# Patient Record
Sex: MALE | Race: WHITE | Employment: OTHER | URBAN - METROPOLITAN AREA
[De-identification: names, ages, dates, MRNs, and addresses within clinical notes are randomized per-mention and may not be internally consistent; named-entity substitution may affect disease eponyms.]

---

## 2017-03-03 ENCOUNTER — OFFICE VISIT (OUTPATIENT)
Dept: INTERNAL MEDICINE CLINIC | Facility: CLINIC | Age: 72
End: 2017-03-03

## 2017-03-03 VITALS
HEART RATE: 88 BPM | BODY MASS INDEX: 26.63 KG/M2 | SYSTOLIC BLOOD PRESSURE: 138 MMHG | HEIGHT: 70 IN | DIASTOLIC BLOOD PRESSURE: 88 MMHG | WEIGHT: 186 LBS | RESPIRATION RATE: 16 BRPM

## 2017-03-03 DIAGNOSIS — E55.9 VITAMIN D DEFICIENCY: ICD-10-CM

## 2017-03-03 DIAGNOSIS — E78.2 MIXED HYPERLIPIDEMIA: ICD-10-CM

## 2017-03-03 DIAGNOSIS — K29.70 GASTRITIS WITHOUT BLEEDING, UNSPECIFIED CHRONICITY, UNSPECIFIED GASTRITIS TYPE: ICD-10-CM

## 2017-03-03 DIAGNOSIS — Z12.5 SCREENING FOR PROSTATE CANCER: ICD-10-CM

## 2017-03-03 DIAGNOSIS — I10 ESSENTIAL HYPERTENSION: Primary | ICD-10-CM

## 2017-03-03 PROCEDURE — G0463 HOSPITAL OUTPT CLINIC VISIT: HCPCS | Performed by: INTERNAL MEDICINE

## 2017-03-03 PROCEDURE — 99214 OFFICE O/P EST MOD 30 MIN: CPT | Performed by: INTERNAL MEDICINE

## 2017-03-03 RX ORDER — NAPROXEN 500 MG/1
500 TABLET ORAL AS NEEDED
Qty: 30 TABLET | Refills: 3 | Status: SHIPPED | OUTPATIENT
Start: 2017-03-03 | End: 2019-01-07

## 2017-03-03 RX ORDER — OMEPRAZOLE 20 MG/1
20 CAPSULE, DELAYED RELEASE ORAL EVERY MORNING
Qty: 90 CAPSULE | Refills: 3 | Status: SHIPPED | OUTPATIENT
Start: 2017-03-03 | End: 2018-01-23

## 2017-03-03 RX ORDER — LISINOPRIL AND HYDROCHLOROTHIAZIDE 12.5; 1 MG/1; MG/1
1 TABLET ORAL DAILY
Qty: 90 TABLET | Refills: 3 | Status: SHIPPED | OUTPATIENT
Start: 2017-03-03 | End: 2018-01-23

## 2017-03-03 NOTE — PROGRESS NOTES
Litzy Cabrera is a 73year old male. HPI:     1. Essential hypertension    Patient has been following low salt diet and has been taking anti-hypertensive prescriptions as prescribed.  Blood pressure has been checked and is under good control at home wit nourished,in no apparent distress  SKIN: no rashes,no suspicious lesions  HEENT: atraumatic, normocephalic,ears and throat are clear  NECK: supple,no adenopathy,no bruits  LUNGS: clear to auscultation  CARDIO: RRR without murmur  GI: good BS's,no masses, H

## 2017-03-07 ENCOUNTER — LAB ENCOUNTER (OUTPATIENT)
Dept: LAB | Age: 72
End: 2017-03-07
Attending: INTERNAL MEDICINE
Payer: MEDICARE

## 2017-03-07 DIAGNOSIS — E55.9 VITAMIN D DEFICIENCY: ICD-10-CM

## 2017-03-07 DIAGNOSIS — I10 ESSENTIAL HYPERTENSION: ICD-10-CM

## 2017-03-07 DIAGNOSIS — E78.2 MIXED HYPERLIPIDEMIA: ICD-10-CM

## 2017-03-07 DIAGNOSIS — Z12.5 SCREENING FOR PROSTATE CANCER: ICD-10-CM

## 2017-03-07 LAB
ALBUMIN SERPL BCP-MCNC: 3.6 G/DL (ref 3.5–4.8)
ALBUMIN/GLOB SERPL: 1.3 {RATIO} (ref 1–2)
ALP SERPL-CCNC: 83 U/L (ref 32–100)
ALT SERPL-CCNC: 21 U/L (ref 17–63)
ANION GAP SERPL CALC-SCNC: 7 MMOL/L (ref 0–18)
AST SERPL-CCNC: 21 U/L (ref 15–41)
BASOPHILS # BLD: 0.1 K/UL (ref 0–0.2)
BASOPHILS NFR BLD: 1 %
BILIRUB SERPL-MCNC: 1.1 MG/DL (ref 0.3–1.2)
BUN SERPL-MCNC: 13 MG/DL (ref 8–20)
BUN/CREAT SERPL: 12.6 (ref 10–20)
CALCIUM SERPL-MCNC: 8.9 MG/DL (ref 8.5–10.5)
CHLORIDE SERPL-SCNC: 104 MMOL/L (ref 95–110)
CHOLEST SERPL-MCNC: 165 MG/DL (ref 110–200)
CO2 SERPL-SCNC: 28 MMOL/L (ref 22–32)
CREAT SERPL-MCNC: 1.03 MG/DL (ref 0.5–1.5)
EOSINOPHIL # BLD: 3.1 K/UL (ref 0–0.7)
EOSINOPHIL NFR BLD: 28 %
ERYTHROCYTE [DISTWIDTH] IN BLOOD BY AUTOMATED COUNT: 14.4 % (ref 11–15)
GLOBULIN PLAS-MCNC: 2.8 G/DL (ref 2.5–3.7)
GLUCOSE SERPL-MCNC: 99 MG/DL (ref 70–99)
HCT VFR BLD AUTO: 42.4 % (ref 41–52)
HDLC SERPL-MCNC: 42 MG/DL
HGB BLD-MCNC: 13.9 G/DL (ref 13.5–17.5)
LDLC SERPL CALC-MCNC: 107 MG/DL (ref 0–99)
LYMPHOCYTES # BLD: 2.1 K/UL (ref 1–4)
LYMPHOCYTES NFR BLD: 19 %
MCH RBC QN AUTO: 28.3 PG (ref 27–32)
MCHC RBC AUTO-ENTMCNC: 32.8 G/DL (ref 32–37)
MCV RBC AUTO: 86.3 FL (ref 80–100)
MONOCYTES # BLD: 0.6 K/UL (ref 0–1)
MONOCYTES NFR BLD: 6 %
NEUTROPHILS # BLD AUTO: 5.2 K/UL (ref 1.8–7.7)
NEUTROPHILS NFR BLD: 47 %
NONHDLC SERPL-MCNC: 123 MG/DL
OSMOLALITY UR CALC.SUM OF ELEC: 288 MOSM/KG (ref 275–295)
PLATELET # BLD AUTO: 333 K/UL (ref 140–400)
PMV BLD AUTO: 8.5 FL (ref 7.4–10.3)
POTASSIUM SERPL-SCNC: 3.7 MMOL/L (ref 3.3–5.1)
PROT SERPL-MCNC: 6.4 G/DL (ref 5.9–8.4)
PSA SERPL-MCNC: 0.5 NG/ML (ref 0–4)
RBC # BLD AUTO: 4.92 M/UL (ref 4.5–5.9)
SODIUM SERPL-SCNC: 139 MMOL/L (ref 136–144)
TRIGL SERPL-MCNC: 78 MG/DL (ref 1–149)
TSH SERPL-ACNC: 2.7 UIU/ML (ref 0.34–5.6)
WBC # BLD AUTO: 11.1 K/UL (ref 4–11)

## 2017-03-07 PROCEDURE — 80061 LIPID PANEL: CPT

## 2017-03-07 PROCEDURE — 85060 BLOOD SMEAR INTERPRETATION: CPT

## 2017-03-07 PROCEDURE — 84443 ASSAY THYROID STIM HORMONE: CPT

## 2017-03-07 PROCEDURE — 85007 BL SMEAR W/DIFF WBC COUNT: CPT

## 2017-03-07 PROCEDURE — 85025 COMPLETE CBC W/AUTO DIFF WBC: CPT

## 2017-03-07 PROCEDURE — 82306 VITAMIN D 25 HYDROXY: CPT

## 2017-03-07 PROCEDURE — 36415 COLL VENOUS BLD VENIPUNCTURE: CPT

## 2017-03-07 PROCEDURE — 80053 COMPREHEN METABOLIC PANEL: CPT

## 2017-03-07 PROCEDURE — 85027 COMPLETE CBC AUTOMATED: CPT

## 2017-03-08 LAB — 25(OH)D3 SERPL-MCNC: 23.9 NG/ML

## 2017-03-20 ENCOUNTER — TELEPHONE (OUTPATIENT)
Dept: INTERNAL MEDICINE CLINIC | Facility: CLINIC | Age: 72
End: 2017-03-20

## 2018-01-23 ENCOUNTER — OFFICE VISIT (OUTPATIENT)
Dept: INTERNAL MEDICINE CLINIC | Facility: CLINIC | Age: 73
End: 2018-01-23

## 2018-01-23 VITALS
HEART RATE: 76 BPM | WEIGHT: 187 LBS | BODY MASS INDEX: 26.77 KG/M2 | SYSTOLIC BLOOD PRESSURE: 133 MMHG | DIASTOLIC BLOOD PRESSURE: 80 MMHG | RESPIRATION RATE: 16 BRPM | HEIGHT: 70 IN

## 2018-01-23 DIAGNOSIS — I10 ESSENTIAL HYPERTENSION WITH GOAL BLOOD PRESSURE LESS THAN 140/90: Primary | ICD-10-CM

## 2018-01-23 DIAGNOSIS — Z12.5 SCREENING FOR PROSTATE CANCER: ICD-10-CM

## 2018-01-23 DIAGNOSIS — E55.9 VITAMIN D DEFICIENCY: ICD-10-CM

## 2018-01-23 DIAGNOSIS — E78.2 MIXED HYPERLIPIDEMIA: ICD-10-CM

## 2018-01-23 DIAGNOSIS — L98.9 NON-HEALING SKIN LESION OF NOSE: ICD-10-CM

## 2018-01-23 DIAGNOSIS — K29.70 GASTRITIS WITHOUT BLEEDING, UNSPECIFIED CHRONICITY, UNSPECIFIED GASTRITIS TYPE: ICD-10-CM

## 2018-01-23 PROCEDURE — G0463 HOSPITAL OUTPT CLINIC VISIT: HCPCS | Performed by: INTERNAL MEDICINE

## 2018-01-23 PROCEDURE — 99214 OFFICE O/P EST MOD 30 MIN: CPT | Performed by: INTERNAL MEDICINE

## 2018-01-23 RX ORDER — OMEPRAZOLE 20 MG/1
20 CAPSULE, DELAYED RELEASE ORAL EVERY MORNING
Qty: 90 CAPSULE | Refills: 3 | Status: SHIPPED | OUTPATIENT
Start: 2018-01-23 | End: 2018-07-03 | Stop reason: ALTCHOICE

## 2018-01-23 RX ORDER — LISINOPRIL AND HYDROCHLOROTHIAZIDE 12.5; 1 MG/1; MG/1
1 TABLET ORAL DAILY
Qty: 90 TABLET | Refills: 3 | Status: SHIPPED | OUTPATIENT
Start: 2018-01-23 | End: 2019-01-07

## 2018-01-23 NOTE — PROGRESS NOTES
Dannie Person. is a 81year old male. HPI:     1. Essential hypertension with goal blood pressure less than 140/90    Patient has been following low salt diet and has been taking anti-hypertensive prescriptions as prescribed.  Blood pressure has been francie • Unspecified essential hypertension       Social History:  Smoking status: Never Smoker                                                              Alcohol use:  Yes              Comment: Occasionally; beer and wine        REVIEW OF SYSTEMS:   GENERAL H Future  - ASSAY, THYROID STIM HORMONE; Future    3. Gastritis without bleeding, unspecified chronicity, unspecified gastritis type    Had hiatal hernia and has been on omeprazole intermittently for years. Had UGI endoscopy that showed hiatal hernia.  Needs

## 2018-03-21 ENCOUNTER — NURSE TRIAGE (OUTPATIENT)
Dept: OTHER | Age: 73
End: 2018-03-21

## 2018-03-21 NOTE — TELEPHONE ENCOUNTER
Pt contacted and stts he will check his schedule and call back for appt. Stts he may not need it.   Stts he is feeling better

## 2018-03-21 NOTE — TELEPHONE ENCOUNTER
Action Requested: Summary for Provider     []  Critical Lab, Recommendations Needed  [] Need Additional Advice  []   FYI    []   Need Orders  [x] Need Medications Sent to Pharmacy  []  Other     SUMMARY: pt requesting abx, Offered appt, pt declined     Ons

## 2018-04-06 ENCOUNTER — HOSPITAL ENCOUNTER (OUTPATIENT)
Age: 73
Discharge: HOME | End: 2018-04-06
Attending: EMERGENCY MEDICINE
Payer: MEDICARE

## 2018-04-06 VITALS
RESPIRATION RATE: 18 BRPM | OXYGEN SATURATION: 96 % | WEIGHT: 182 LBS | TEMPERATURE: 98 F | DIASTOLIC BLOOD PRESSURE: 79 MMHG | SYSTOLIC BLOOD PRESSURE: 140 MMHG | HEIGHT: 70 IN | HEART RATE: 80 BPM | BODY MASS INDEX: 26.05 KG/M2

## 2018-04-06 DIAGNOSIS — S61.412A LACERATION OF LEFT HAND, FOREIGN BODY PRESENCE UNSPECIFIED, INITIAL ENCOUNTER: Primary | ICD-10-CM

## 2018-04-06 PROCEDURE — 99213 OFFICE O/P EST LOW 20 MIN: CPT

## 2018-04-06 PROCEDURE — 99204 OFFICE O/P NEW MOD 45 MIN: CPT

## 2018-04-06 PROCEDURE — 29130 APPL FINGER SPLINT STATIC: CPT

## 2018-04-06 RX ORDER — CEPHALEXIN 500 MG/1
500 CAPSULE ORAL 3 TIMES DAILY
Qty: 21 CAPSULE | Refills: 0 | Status: SHIPPED | OUTPATIENT
Start: 2018-04-06 | End: 2018-04-13

## 2018-04-06 NOTE — ED PROVIDER NOTES
Patient Seen in: [facility] In [address] [place] [address]    History   Patient presents with:  Laceration Abrasion (integumentary)    Stated Complaint: lac    HPI  Prior to arrival patient was at home using an angle  cutting fiberglass in his Constitutional: He is oriented to person, place, and time. He appears well-developed and well-nourished. No distress. Well appearing   HENT:   Head: Normocephalic and atraumatic.    Right Ear: External ear normal.   Left Ear: External ear normal.   Eyes: MDM   Follow-up, return and home management were discussed with the patient. Wound check in 2  or 3 days was advised. Patient is concerned about infection.   As he does not want an x-ray I will start him on antibiotics now is a feel I do not have enough i

## 2018-04-06 NOTE — ED NOTES
Please void order for dermabond. Dermabond not used.  MD used benzoin and steri strips to close wound

## 2018-04-06 NOTE — ED INITIAL ASSESSMENT (HPI)
Pt with laceration to left hand. States he cut himself with and Angle  today. States last tetanus shot was 2017.

## 2018-04-10 NOTE — TELEPHONE ENCOUNTER
Hypertensive Medications: Protocol failed, please advise on prescription request.  Labs out of range, please advise.     Protocol Criteria:  · Appointment scheduled in the past 6 months or in the next 3 months  · BMP or CMP in the past 12 months  · Creatini

## 2018-04-11 RX ORDER — LISINOPRIL AND HYDROCHLOROTHIAZIDE 12.5; 1 MG/1; MG/1
1 TABLET ORAL DAILY
Qty: 90 TABLET | Refills: 2 | Status: SHIPPED | OUTPATIENT
Start: 2018-04-11 | End: 2019-02-12

## 2018-04-12 ENCOUNTER — LAB ENCOUNTER (OUTPATIENT)
Dept: LAB | Age: 73
End: 2018-04-12
Attending: INTERNAL MEDICINE
Payer: MEDICARE

## 2018-04-12 DIAGNOSIS — I10 ESSENTIAL HYPERTENSION WITH GOAL BLOOD PRESSURE LESS THAN 140/90: ICD-10-CM

## 2018-04-12 DIAGNOSIS — E55.9 VITAMIN D DEFICIENCY: ICD-10-CM

## 2018-04-12 DIAGNOSIS — E78.2 MIXED HYPERLIPIDEMIA: ICD-10-CM

## 2018-04-12 DIAGNOSIS — Z12.5 SCREENING FOR PROSTATE CANCER: ICD-10-CM

## 2018-04-12 PROCEDURE — 36415 COLL VENOUS BLD VENIPUNCTURE: CPT

## 2018-04-12 PROCEDURE — 80053 COMPREHEN METABOLIC PANEL: CPT

## 2018-04-12 PROCEDURE — 85060 BLOOD SMEAR INTERPRETATION: CPT

## 2018-04-12 PROCEDURE — 85007 BL SMEAR W/DIFF WBC COUNT: CPT

## 2018-04-12 PROCEDURE — 80061 LIPID PANEL: CPT

## 2018-04-12 PROCEDURE — 82306 VITAMIN D 25 HYDROXY: CPT

## 2018-04-12 PROCEDURE — 85025 COMPLETE CBC W/AUTO DIFF WBC: CPT

## 2018-04-12 PROCEDURE — 85027 COMPLETE CBC AUTOMATED: CPT

## 2018-04-12 PROCEDURE — 84443 ASSAY THYROID STIM HORMONE: CPT

## 2018-04-24 ENCOUNTER — TELEPHONE (OUTPATIENT)
Dept: OTHER | Age: 73
End: 2018-04-24

## 2018-04-24 NOTE — TELEPHONE ENCOUNTER
Attempted to call pt , no answer no VM   Retry later      Notes recorded by Charles Hill MD on 4/24/2018 at 12:43 PM CDT  Blood test is good with normal blood  liver, thyroid, urine, lipids, PSA and kidney tests. Mild anemia seen.  When was last col

## 2018-04-26 NOTE — TELEPHONE ENCOUNTER
Patient returning a call (verified name and ), advised Dr Vera Silveira's notes and verablized understanding.  States that he has OV schedule with GI specialist on 18 and will do the Colonoscopy, requesting copy of all the blood tests and mail to the ad

## 2018-05-16 ENCOUNTER — OFFICE VISIT (OUTPATIENT)
Dept: GASTROENTEROLOGY | Facility: CLINIC | Age: 73
End: 2018-05-16

## 2018-05-16 ENCOUNTER — TELEPHONE (OUTPATIENT)
Dept: GASTROENTEROLOGY | Facility: CLINIC | Age: 73
End: 2018-05-16

## 2018-05-16 VITALS
HEIGHT: 68 IN | HEART RATE: 75 BPM | WEIGHT: 175 LBS | SYSTOLIC BLOOD PRESSURE: 130 MMHG | DIASTOLIC BLOOD PRESSURE: 69 MMHG | BODY MASS INDEX: 26.52 KG/M2

## 2018-05-16 DIAGNOSIS — Z86.010 HISTORY OF COLON POLYPS: ICD-10-CM

## 2018-05-16 DIAGNOSIS — Z12.11 COLON CANCER SCREENING: Primary | ICD-10-CM

## 2018-05-16 DIAGNOSIS — Z86.010 HISTORY OF COLONIC POLYPS: ICD-10-CM

## 2018-05-16 DIAGNOSIS — K21.9 GASTROESOPHAGEAL REFLUX DISEASE WITHOUT ESOPHAGITIS: Primary | ICD-10-CM

## 2018-05-16 DIAGNOSIS — Z12.11 SCREENING FOR COLORECTAL CANCER: ICD-10-CM

## 2018-05-16 DIAGNOSIS — Z12.12 SCREENING FOR COLORECTAL CANCER: ICD-10-CM

## 2018-05-16 PROCEDURE — 99203 OFFICE O/P NEW LOW 30 MIN: CPT | Performed by: INTERNAL MEDICINE

## 2018-05-16 PROCEDURE — G0463 HOSPITAL OUTPT CLINIC VISIT: HCPCS | Performed by: INTERNAL MEDICINE

## 2018-05-16 NOTE — PATIENT INSTRUCTIONS
1.  Please take 20 mg of Pepcid (famotidine) prior to eating any food that causes your symptoms. 2.  Schedule colonoscopy for a history of polyps and screening following a Suprep (may substitute TriLyte if needed) and IV sedation.

## 2018-05-16 NOTE — PROGRESS NOTES
HPI:    Patient ID: Haseeb Bryant. is a 81year old male. HPI  Mr. Jo returns in follow-up. He has not been seen since 2009. As per previous notes the patient was hospitalized for gastrointestinal bleeding in June 2007.   A colonoscopy revealed kg)  04/06/18 : 182 lb (82.6 kg)  01/23/18 : 187 lb (84.8 kg)  03/03/17 : 186 lb (84.4 kg)  02/15/16 : 183 lb 6.4 oz (83.2 kg)  04/09/15 : 183 lb (83 kg)  03/13/15 : 184 lb 3.2 oz (83.6 kg)           Current Outpatient Prescriptions:  Na Sulfate-K Sulfate- reviewed.     Component      Latest Ref Rng & Units 4/12/2018 3/7/2017 3/19/2015   WBC      4.0 - 11.0 K/UL 12.0 (H) 11.1 (H) 12.7 (H)   RBC      4.50 - 5.90 M/UL 4.79 4.92 4.90   Hemoglobin      13.5 - 17.5 g/dL 13.2 (L) 13.9 14.0   Hematocrit      41.0 - NEUTROPHILS #      1.8 - 7.7 K/UL   6.7   LYMPHOCYTES #      1.0 - 4.0 K/UL   2.3   MONOCYTES #      0.0 - 1.0 K/UL   1.0   EOSINOPHILS #      0.0 - 0.7 K/UL   2.5 (H)   BASOPHILS #      0.0 - 0.2 K/UL   0.1   Vitamin D, 25OH, Total      ng/mL 27.5

## 2018-05-16 NOTE — TELEPHONE ENCOUNTER
Scheduled for: colonoscopy 45777  Provider Name: Dr Sherri Cuevas  Date:  Kiley Scott 7/03/18  Location:  [facility]  Sedation:  IV  Time:  10:15 am  Prep: new split dose suprep   Meds/Allergies Reconciled?:  NKDA  Diagnosis with codes:  Colon cancer screening Z12.11, HCP Z86.010

## 2018-07-03 ENCOUNTER — HOSPITAL ENCOUNTER (OUTPATIENT)
Facility: HOSPITAL | Age: 73
Setting detail: HOSPITAL OUTPATIENT SURGERY
Discharge: HOME | End: 2018-07-03
Attending: INTERNAL MEDICINE | Admitting: INTERNAL MEDICINE
Payer: MEDICARE

## 2018-07-03 ENCOUNTER — SURGERY (OUTPATIENT)
Age: 73
End: 2018-07-03

## 2018-07-03 VITALS
HEART RATE: 56 BPM | OXYGEN SATURATION: 95 % | SYSTOLIC BLOOD PRESSURE: 119 MMHG | RESPIRATION RATE: 14 BRPM | BODY MASS INDEX: 24.77 KG/M2 | WEIGHT: 173 LBS | HEIGHT: 70 IN | DIASTOLIC BLOOD PRESSURE: 73 MMHG

## 2018-07-03 DIAGNOSIS — Z12.11 COLON CANCER SCREENING: ICD-10-CM

## 2018-07-03 DIAGNOSIS — Z86.010 HISTORY OF COLONIC POLYPS: ICD-10-CM

## 2018-07-03 PROCEDURE — 0DBH8ZX EXCISION OF CECUM, VIA NATURAL OR ARTIFICIAL OPENING ENDOSCOPIC, DIAGNOSTIC: ICD-10-PCS | Performed by: INTERNAL MEDICINE

## 2018-07-03 PROCEDURE — 43239 EGD BIOPSY SINGLE/MULTIPLE: CPT | Performed by: INTERNAL MEDICINE

## 2018-07-03 PROCEDURE — 0D748ZZ DILATION OF ESOPHAGOGASTRIC JUNCTION, VIA NATURAL OR ARTIFICIAL OPENING ENDOSCOPIC: ICD-10-PCS | Performed by: INTERNAL MEDICINE

## 2018-07-03 PROCEDURE — 45380 COLONOSCOPY AND BIOPSY: CPT | Performed by: INTERNAL MEDICINE

## 2018-07-03 PROCEDURE — 0DB48ZX EXCISION OF ESOPHAGOGASTRIC JUNCTION, VIA NATURAL OR ARTIFICIAL OPENING ENDOSCOPIC, DIAGNOSTIC: ICD-10-PCS | Performed by: INTERNAL MEDICINE

## 2018-07-03 PROCEDURE — 45385 COLONOSCOPY W/LESION REMOVAL: CPT | Performed by: INTERNAL MEDICINE

## 2018-07-03 PROCEDURE — 43249 ESOPH EGD DILATION <30 MM: CPT | Performed by: INTERNAL MEDICINE

## 2018-07-03 RX ORDER — MIDAZOLAM HYDROCHLORIDE 1 MG/ML
INJECTION INTRAMUSCULAR; INTRAVENOUS
Status: DISCONTINUED | OUTPATIENT
Start: 2018-07-03 | End: 2018-07-03 | Stop reason: HOSPADM

## 2018-07-03 RX ORDER — PANTOPRAZOLE SODIUM 40 MG/1
40 TABLET, DELAYED RELEASE ORAL
Qty: 90 TABLET | Refills: 1 | Status: ON HOLD | OUTPATIENT
Start: 2018-07-03 | End: 2018-10-10

## 2018-07-03 RX ORDER — SODIUM CHLORIDE 0.9 % (FLUSH) 0.9 %
10 SYRINGE (ML) INJECTION AS NEEDED
Status: DISCONTINUED | OUTPATIENT
Start: 2018-07-03 | End: 2018-07-03 | Stop reason: HOSPADM

## 2018-07-03 RX ORDER — SODIUM CHLORIDE, SODIUM LACTATE, POTASSIUM CHLORIDE, CALCIUM CHLORIDE 600; 310; 30; 20 MG/100ML; MG/100ML; MG/100ML; MG/100ML
INJECTION, SOLUTION INTRAVENOUS CONTINUOUS
Status: DISCONTINUED | OUTPATIENT
Start: 2018-07-03 | End: 2018-07-03

## 2018-07-03 RX ORDER — MIDAZOLAM HYDROCHLORIDE 1 MG/ML
1 INJECTION INTRAMUSCULAR; INTRAVENOUS EVERY 5 MIN PRN
Status: DISCONTINUED | OUTPATIENT
Start: 2018-07-03 | End: 2018-07-03 | Stop reason: HOSPADM

## 2018-07-03 NOTE — H&P
History & Physical Examination    Patient Name: Danyell Pop MRN: [id number]  CSN: [id number]  YOB: 1945    Diagnosis: Personal history of adenomatous colon polyps, colorectal cancer screening, gastroesophageal reflux/esophageal spasm/? d Exam is Normal If not normal, please explain:   JOHANNA Adger.Radha ] [ [facility]  Adger.Radha ] [ Bradley Hale Adger.Radha ] [ Minnie Willson Adger.Radha ] [ Nelly Cueva Adger.Radha ] [ Tasneem Record Adger.Radha ] [ Duane Gardener        [ x ] I have discussed the risks and benefits and alternatives with the p

## 2018-07-03 NOTE — OPERATIVE REPORT
[facility] Endoscopy Report      Date of Procedure:  07/03/18      Preoperative Diagnosis:  1. Personal history of adenomatous colon polyps  2. Colorectal cancer screening  3.   Gastroesophageal reflux/possible esophageal spasm/possible dy midazolam in divided doses, an Olympus adult HD gastroscope was inserted into the hypopharynx and advanced under direct vision into the esophagus, stomach and duodenum.   The endoscope was withdrawn to the stomach where retroflexion of the angulus, body, ca including cardia, fundus, gastric body and antrum was normal with the exception of a patulous cardia.    The duodenal bulb and post bulbar regions were normal.  The ampulla was visualized and normal.    Following diagnostic endoscopy a TTS balloon dilator w

## 2018-07-10 ENCOUNTER — TELEPHONE (OUTPATIENT)
Dept: GASTROENTEROLOGY | Facility: CLINIC | Age: 73
End: 2018-07-10

## 2018-07-10 NOTE — TELEPHONE ENCOUNTER
----- Message from Amaury Wilkinson MD sent at 7/9/2018  7:32 PM CDT -----  I spoke to the patient. He is feeling significantly better. He had #2 subcentimeter tubular adenomas removed. I have discussed the significance.   Biopsies of the cecal nodul

## 2018-08-03 ENCOUNTER — TELEPHONE (OUTPATIENT)
Dept: GASTROENTEROLOGY | Facility: CLINIC | Age: 73
End: 2018-08-03

## 2018-08-03 NOTE — TELEPHONE ENCOUNTER
----- Message from Darrel Eagle, Ariana Castle sent at 7/10/2018  9:04 AM CDT -----  Regarding: Recall EGD   Recall EGD in 3 months per GS. EGD done 7-3-18.

## 2018-08-13 ENCOUNTER — TELEPHONE (OUTPATIENT)
Dept: GASTROENTEROLOGY | Facility: CLINIC | Age: 73
End: 2018-08-13

## 2018-08-13 DIAGNOSIS — K20.90 ESOPHAGITIS: Primary | ICD-10-CM

## 2018-08-13 NOTE — TELEPHONE ENCOUNTER
Last Procedure:  7/3/18 EGD  Last Diagnosis:  5. LA grade D esophagitis  6. Slight esophageal stricture  7.  5 cm hiatal hernia  8. Status post esophageal dilatation to 16.5 mm TTS.   Recalled for: 12 weeks EGD ONLY  Sedation used previously:  IV  Last P

## 2018-08-13 NOTE — TELEPHONE ENCOUNTER
The patient's chart has been reviewed. Okay to schedule pt for 12 week EGD recall r/t esophagitis/ulceration with Dr. Dionne Pate. Advise IV Revere sedation with NPO after midnight.      May continue all medications listed in the med module

## 2018-08-13 NOTE — TELEPHONE ENCOUNTER
Pt states he got a letter for a REPEAT EGD . Please call .  States medication given to pt from  everything has been working good and wanted to inform

## 2018-08-14 NOTE — TELEPHONE ENCOUNTER
Scheduled for:  EGD - 30034  Provider Name:  Dr. Galina Alejandra  Date:  10/10/18  Location:  [facility]  Sedation:  IV  Time:  9:00 am (pt is aware to arrive at 8:00 am)  Prep:  NPO after midnight, Prep instructions were given to pt over the phone, pt verbalized understandin

## 2018-09-25 ENCOUNTER — OFFICE VISIT (OUTPATIENT)
Dept: DERMATOLOGY CLINIC | Facility: CLINIC | Age: 73
End: 2018-09-25
Payer: MEDICARE

## 2018-09-25 DIAGNOSIS — D48.5 NEOPLASM OF UNCERTAIN BEHAVIOR OF SKIN: Primary | ICD-10-CM

## 2018-09-25 DIAGNOSIS — L57.8 SUN-DAMAGED SKIN: ICD-10-CM

## 2018-09-25 DIAGNOSIS — L81.4 SOLAR LENTIGO: ICD-10-CM

## 2018-09-25 DIAGNOSIS — D18.01 HEMANGIOMA OF SKIN AND SUBCUTANEOUS TISSUE: ICD-10-CM

## 2018-09-25 DIAGNOSIS — L82.1 SEBORRHEIC KERATOSES: ICD-10-CM

## 2018-09-25 DIAGNOSIS — D23.30 BENIGN NEOPLASM OF SKIN OF FACE: ICD-10-CM

## 2018-09-25 DIAGNOSIS — L57.0 ACTINIC KERATOSIS: ICD-10-CM

## 2018-09-25 PROCEDURE — 17000 DESTRUCT PREMALG LESION: CPT | Performed by: DERMATOLOGY

## 2018-09-25 PROCEDURE — 88305 TISSUE EXAM BY PATHOLOGIST: CPT | Performed by: DERMATOLOGY

## 2018-09-25 PROCEDURE — 99202 OFFICE O/P NEW SF 15 MIN: CPT | Performed by: DERMATOLOGY

## 2018-09-25 PROCEDURE — 11100 BIOPSY OF SKIN LESION: CPT | Performed by: DERMATOLOGY

## 2018-09-25 NOTE — PROGRESS NOTES
HPI:     Chief Complaint     Lesion        HPI     Lesion      Additional comments:  \"New pt\"- Pt presents today with lesion to chest that was raised at onetime and now is flat and tends to get irriated at times and also would like discoloration to nose e (50,000 Units total) by mouth once a week.  Disp: 12 capsule Rfl: 0     Allergies:   No Known Allergies    Past Medical History:  No date: Esophageal reflux  02/17/2014: Lipid screening  No date: Psoriasis  No date: Unspecified essential hypertension  Past Asked        Weight Concern: Not Asked        Special Diet: Not Asked        Back Care: Not Asked        Exercise: Not Asked        Bike Helmet: Not Asked        Seat Belt: Not Asked        Self-Exams: Not Asked        Grew up on a farm: Not Asked        H OF SKIN LESION      DESTRUCTION PREMALIGNANT LESIONS, FIRST LES      Specimen to Pathology, Tissue [IHP Pt to Josiane Lyles      Results From Past 48 Hours:  No results found for this or any previous visit (from the past 50 hour(s)).     Meds This Visit:

## 2018-09-27 NOTE — PROGRESS NOTES
Patient informed with a verbal understanding. Logged and in [address].  Appt booked

## 2018-10-10 ENCOUNTER — HOSPITAL ENCOUNTER (OUTPATIENT)
Facility: HOSPITAL | Age: 73
Setting detail: HOSPITAL OUTPATIENT SURGERY
Discharge: HOME | End: 2018-10-10
Attending: INTERNAL MEDICINE | Admitting: INTERNAL MEDICINE
Payer: MEDICARE

## 2018-10-10 DIAGNOSIS — K20.90 ESOPHAGITIS: ICD-10-CM

## 2018-10-10 PROCEDURE — 43239 EGD BIOPSY SINGLE/MULTIPLE: CPT | Performed by: INTERNAL MEDICINE

## 2018-10-10 PROCEDURE — 0DB48ZX EXCISION OF ESOPHAGOGASTRIC JUNCTION, VIA NATURAL OR ARTIFICIAL OPENING ENDOSCOPIC, DIAGNOSTIC: ICD-10-PCS | Performed by: INTERNAL MEDICINE

## 2018-10-10 PROCEDURE — G0500 MOD SEDAT ENDO SERVICE >5YRS: HCPCS | Performed by: INTERNAL MEDICINE

## 2018-10-10 RX ORDER — PANTOPRAZOLE SODIUM 40 MG/1
40 TABLET, DELAYED RELEASE ORAL
Qty: 90 TABLET | Refills: 3 | Status: SHIPPED | OUTPATIENT
Start: 2018-10-10 | End: 2019-02-05

## 2018-10-10 RX ORDER — SODIUM CHLORIDE 0.9 % (FLUSH) 0.9 %
10 SYRINGE (ML) INJECTION AS NEEDED
Status: DISCONTINUED | OUTPATIENT
Start: 2018-10-10 | End: 2018-10-10

## 2018-10-10 RX ORDER — SODIUM CHLORIDE, SODIUM LACTATE, POTASSIUM CHLORIDE, CALCIUM CHLORIDE 600; 310; 30; 20 MG/100ML; MG/100ML; MG/100ML; MG/100ML
INJECTION, SOLUTION INTRAVENOUS CONTINUOUS
Status: DISCONTINUED | OUTPATIENT
Start: 2018-10-10 | End: 2018-10-10

## 2018-10-10 RX ORDER — MIDAZOLAM HYDROCHLORIDE 1 MG/ML
1 INJECTION INTRAMUSCULAR; INTRAVENOUS EVERY 5 MIN PRN
Status: DISCONTINUED | OUTPATIENT
Start: 2018-10-10 | End: 2018-10-10

## 2018-10-10 RX ORDER — MIDAZOLAM HYDROCHLORIDE 1 MG/ML
INJECTION INTRAMUSCULAR; INTRAVENOUS
Status: DISCONTINUED | OUTPATIENT
Start: 2018-10-10 | End: 2018-10-10

## 2018-10-10 NOTE — OPERATIVE REPORT
[facility] Endoscopy Report      Date of Procedure:  10/10/18        Preoperative Diagnosis:  History of erosive esophagitis confirm healing      Postoperative Diagnosis:  1. Healed esophagitis  2.   Hiatal hernia with focal residual Ledell Julio duodenal bulb and post bulbar regions were normal.      Impression:  1. Healed esophagitis  2. Hiatal hernia with focal residual gastritis    Recommendations:  1. Continue pantoprazole or equivalent indefinitely. 2.  Follow-up biopsy results.   3.  [place]

## 2018-10-10 NOTE — H&P
History & Physical Examination    Patient Name: Ivy Mcmahon.   MRN: [id number]  [facility]: [id number]  YOB: 1945    Diagnosis: History of erosive esophagitis, confirm healing        Medications Prior to Admission:  Pantoprazole Sodium 40 MG Oral [facility]     Family History   Problem Relation Age of Onset   • Diabetes Mother      Social History    Tobacco Use      Smoking status: Former Smoker        Quit date: 1972        Years since quittin.8      Smokeless tobacco: Never Used    Al

## 2018-10-11 VITALS
WEIGHT: 182 LBS | RESPIRATION RATE: 11 BRPM | OXYGEN SATURATION: 96 % | SYSTOLIC BLOOD PRESSURE: 108 MMHG | HEART RATE: 52 BPM | HEIGHT: 70 IN | BODY MASS INDEX: 26.05 KG/M2 | DIASTOLIC BLOOD PRESSURE: 73 MMHG

## 2018-11-26 ENCOUNTER — OFFICE VISIT (OUTPATIENT)
Dept: DERMATOLOGY CLINIC | Facility: CLINIC | Age: 73
End: 2018-11-26
Payer: MEDICARE

## 2018-11-26 VITALS — DIASTOLIC BLOOD PRESSURE: 82 MMHG | SYSTOLIC BLOOD PRESSURE: 122 MMHG

## 2018-11-26 DIAGNOSIS — D48.5 NEOPLASM OF UNCERTAIN BEHAVIOR OF SKIN: ICD-10-CM

## 2018-11-26 DIAGNOSIS — C44.519 BASAL CELL CARCINOMA OF CHEST: Primary | ICD-10-CM

## 2018-11-26 PROCEDURE — 88305 TISSUE EXAM BY PATHOLOGIST: CPT | Performed by: DERMATOLOGY

## 2018-11-26 PROCEDURE — 11602 EXC TR-EXT MAL+MARG 1.1-2 CM: CPT | Performed by: DERMATOLOGY

## 2018-11-26 PROCEDURE — 12032 INTMD RPR S/A/T/EXT 2.6-7.5: CPT | Performed by: DERMATOLOGY

## 2018-11-26 PROCEDURE — 11100 BIOPSY OF SKIN LESION: CPT | Performed by: DERMATOLOGY

## 2018-11-26 NOTE — PROCEDURES
Procedural Report for Elliptical Excision    Procedure: With the patient is a supine position, the skin was scrubbed with hibiclens. Field block anesthesia was obtained by injecting 6 mL of 1% Xylocaine with Epinephrine.   A fusiform excision whose tota

## 2018-11-26 NOTE — PROGRESS NOTES
Past Medical History:   Diagnosis Date   • BCC (basal cell carcinoma of skin) 09/2018    chest   • Esophageal reflux    • Hearing impairment    • Lipid screening 02/17/2014   • Psoriasis    • Unspecified essential hypertension      Past Surgical History: Concern: Not Asked        Special Diet: Not Asked        Back Care: Not Asked        Exercise: Not Asked        Bike Helmet: Not Asked        Seat Belt: Not Asked        Self-Exams: Not Asked        Grew up on a farm: Not Asked        History of tanning: N

## 2018-11-26 NOTE — PROGRESS NOTES
HPI:     Chief Complaint     Derm Problem        HPI     Derm Problem      Additional comments: LOV: 09/25/18.  Pt presents for an elliptical excision of BCC on the chest.           Last edited by Ariana Rajan on 11/26/2018 10:39 AM. (History) Not on file      Number of children: Not on file      Years of education: Not on file      Highest education level: Not on file    Social Needs      Financial resource strain: Not on file      Food insecurity - worry: Not on file      Food insecurity - fernando dermoscopy      ASSESSMENT/PLAN:   Basal cell carcinoma of chest  (primary encounter diagnosis)-elliptical excision is performed. See the operative note. All consents were signed. All postop instructions given. Patient tolerated procedure well.   Maria Alejandra

## 2018-11-29 NOTE — PROGRESS NOTES
Results logged in, pt informed of test results and all KMT's recc, path faxed to Dr. Chikis Cano, pt has his info

## 2018-12-04 ENCOUNTER — TELEPHONE (OUTPATIENT)
Dept: DERMATOLOGY CLINIC | Facility: CLINIC | Age: 73
End: 2018-12-04

## 2018-12-04 ENCOUNTER — OFFICE VISIT (OUTPATIENT)
Dept: DERMATOLOGY CLINIC | Facility: CLINIC | Age: 73
End: 2018-12-04
Payer: MEDICARE

## 2018-12-04 DIAGNOSIS — Z48.02 VISIT FOR SUTURE REMOVAL: Primary | ICD-10-CM

## 2018-12-04 NOTE — PROGRESS NOTES
HPI:     Chief Complaint     Derm Problem        HPI     Derm Problem      Additional comments: LOV: 11/26/18.  Pt presents for a f/u and suture removal, s/p elliptical excision of BCC on the chest.           Last edited by Nona Adhikari, [address] on 12/4/2018 ESOPHAGOGASTRODUODENOSCOPY (EGD) N/A 7/3/2018    Performed by David Minaya MD at [address] [place] [address] ENDOSCOPY     Social History    Socioeconomic History      Marital status:       Spouse name: Not on file      Number of children: Not on file      Years o There is no erythema or drainage. Wound edges are well approximated. ASSESSMENT/PLAN:   Visit for suture removal  (primary encounter diagnosis) sutures are removed and Steri-Strips are applied.   Patient will followup for reassessment of the area in 3 m

## 2018-12-04 NOTE — PROGRESS NOTES
Past Medical History:   Diagnosis Date   • BCC (basal cell carcinoma of skin) 09/2018    chest   • BCC (basal cell carcinoma) 2018    left nose   • Esophageal reflux    • Hearing impairment    • Lipid screening 02/17/2014   • Psoriasis    • Unspecified ess Stress Concern: Not Asked        Weight Concern: Not Asked        Special Diet: Not Asked        Back Care: Not Asked        Exercise: Not Asked        Bike Helmet: Not Asked        Seat Belt: Not Asked        Self-Exams: Not Asked        Grew up on a fa

## 2019-01-07 RX ORDER — OMEPRAZOLE 20 MG/1
20 CAPSULE, DELAYED RELEASE ORAL EVERY MORNING
Qty: 90 CAPSULE | Refills: 0 | Status: CANCELLED | OUTPATIENT
Start: 2019-01-07

## 2019-01-08 RX ORDER — LISINOPRIL AND HYDROCHLOROTHIAZIDE 12.5; 1 MG/1; MG/1
1 TABLET ORAL DAILY
Qty: 90 TABLET | Refills: 0 | Status: SHIPPED | OUTPATIENT
Start: 2019-01-08 | End: 2019-02-12

## 2019-01-08 RX ORDER — NAPROXEN 500 MG/1
500 TABLET ORAL AS NEEDED
Qty: 30 TABLET | Refills: 0 | Status: SHIPPED | OUTPATIENT
Start: 2019-01-08 | End: 2021-08-31 | Stop reason: ALTCHOICE

## 2019-01-08 NOTE — TELEPHONE ENCOUNTER
Refill passed per [facility] protocol.   Hypertensive Medications  Protocol Criteria:  · Appointment scheduled in the past 6 months or in the next 3 months  · BMP or CMP in the past 12 months  · Creatinine result < 2  Recent Outpatient Visits cell carcinoma of chest    [facility] [place] [facility] Javier High MD    Office Visit    3 months ago Neoplasm of uncertain behavior of skin    [facility] [place] [facility] - [facility] Javier High MD    Office Visit    7 months ago [facility]

## 2019-01-28 ENCOUNTER — TELEPHONE (OUTPATIENT)
Dept: GASTROENTEROLOGY | Facility: CLINIC | Age: 74
End: 2019-01-28

## 2019-01-28 NOTE — TELEPHONE ENCOUNTER
Patient states he was cleaning snow last week and feels like he either has a Inguinal hernia or pulled a muscle in lower stomach region. Patient states he has a dull pain in lower stomachcurrently 3/10 and sometimes gets worse with moving.  Sometimes patien

## 2019-01-29 NOTE — TELEPHONE ENCOUNTER
I think it is reasonable for the patient be seen by Dr. Tiara Rodriguez first.  I would recommend that he keep this appointment.

## 2019-02-05 ENCOUNTER — HOSPITAL ENCOUNTER (OUTPATIENT)
Age: 74
Discharge: ANOTHER HOSPITAL | End: 2019-02-05
Attending: EMERGENCY MEDICINE
Payer: MEDICARE

## 2019-02-05 ENCOUNTER — APPOINTMENT (OUTPATIENT)
Dept: CT IMAGING | Facility: HOSPITAL | Age: 74
End: 2019-02-05
Attending: EMERGENCY MEDICINE
Payer: MEDICARE

## 2019-02-05 ENCOUNTER — HOSPITAL ENCOUNTER (EMERGENCY)
Facility: HOSPITAL | Age: 74
Discharge: HOME | End: 2019-02-05
Attending: EMERGENCY MEDICINE
Payer: MEDICARE

## 2019-02-05 VITALS
TEMPERATURE: 98 F | HEART RATE: 66 BPM | WEIGHT: 184.94 LBS | OXYGEN SATURATION: 98 % | DIASTOLIC BLOOD PRESSURE: 70 MMHG | BODY MASS INDEX: 27 KG/M2 | RESPIRATION RATE: 17 BRPM | SYSTOLIC BLOOD PRESSURE: 139 MMHG

## 2019-02-05 VITALS
TEMPERATURE: 98 F | BODY MASS INDEX: 26.48 KG/M2 | WEIGHT: 185 LBS | RESPIRATION RATE: 18 BRPM | SYSTOLIC BLOOD PRESSURE: 123 MMHG | HEART RATE: 69 BPM | DIASTOLIC BLOOD PRESSURE: 77 MMHG | HEIGHT: 70 IN | OXYGEN SATURATION: 97 %

## 2019-02-05 DIAGNOSIS — R10.9 ABDOMINAL PAIN OF UNKNOWN ETIOLOGY: Primary | ICD-10-CM

## 2019-02-05 DIAGNOSIS — K57.92 ACUTE DIVERTICULITIS: Primary | ICD-10-CM

## 2019-02-05 LAB
ANION GAP SERPL CALC-SCNC: 13 MMOL/L (ref 0–18)
BACTERIA UR QL AUTO: NEGATIVE /HPF
BASOPHILS # BLD AUTO: 0.09 X10(3) UL (ref 0–0.2)
BASOPHILS NFR BLD AUTO: 0.5 %
BILIRUB UR QL: NEGATIVE
BUN SERPL-MCNC: 16 MG/DL (ref 8–20)
BUN/CREAT SERPL: 17.4 (ref 10–20)
CALCIUM SERPL-MCNC: 8.8 MG/DL (ref 8.5–10.5)
CHLORIDE SERPL-SCNC: 103 MMOL/L (ref 95–110)
CLARITY UR: CLEAR
CO2 SERPL-SCNC: 23 MMOL/L (ref 22–32)
COLOR UR: YELLOW
CREAT SERPL-MCNC: 0.92 MG/DL (ref 0.5–1.5)
DEPRECATED RDW RBC AUTO: 43.8 FL (ref 35.1–46.3)
EOSINOPHIL # BLD AUTO: 2.28 X10(3) UL (ref 0–0.7)
EOSINOPHIL NFR BLD AUTO: 12.9 %
ERYTHROCYTE [DISTWIDTH] IN BLOOD BY AUTOMATED COUNT: 15.2 % (ref 11–15)
GLUCOSE SERPL-MCNC: 140 MG/DL (ref 70–99)
GLUCOSE UR-MCNC: NEGATIVE MG/DL
HCT VFR BLD AUTO: 37.6 % (ref 39–53)
HGB BLD-MCNC: 12.2 G/DL (ref 13–17.5)
IMM GRANULOCYTES # BLD AUTO: 0.08 X10(3) UL (ref 0–1)
IMM GRANULOCYTES NFR BLD: 0.5 %
KETONES UR-MCNC: NEGATIVE MG/DL
LEUKOCYTE ESTERASE UR QL STRIP.AUTO: NEGATIVE
LYMPHOCYTES # BLD AUTO: 1.91 X10(3) UL (ref 1–4)
LYMPHOCYTES NFR BLD AUTO: 10.8 %
MCH RBC QN AUTO: 26 PG (ref 26–34)
MCHC RBC AUTO-ENTMCNC: 32.4 G/DL (ref 31–37)
MCV RBC AUTO: 80.2 FL (ref 80–100)
MONOCYTES # BLD AUTO: 0.96 X10(3) UL (ref 0.1–1)
MONOCYTES NFR BLD AUTO: 5.4 %
NEUTROPHILS # BLD AUTO: 12.32 X10 (3) UL (ref 1.5–7.7)
NEUTROPHILS # BLD AUTO: 12.32 X10(3) UL (ref 1.5–7.7)
NEUTROPHILS NFR BLD AUTO: 69.9 %
NITRITE UR QL STRIP.AUTO: NEGATIVE
OSMOLALITY UR CALC.SUM OF ELEC: 291 MOSM/KG (ref 275–295)
PH UR: 5 [PH] (ref 5–8)
PLATELET # BLD AUTO: 638 10(3)UL (ref 150–450)
POTASSIUM SERPL-SCNC: 3.7 MMOL/L (ref 3.3–5.1)
PROT UR-MCNC: NEGATIVE MG/DL
RBC # BLD AUTO: 4.69 X10(6)UL (ref 3.8–5.8)
RBC #/AREA URNS AUTO: 24 /HPF
SODIUM SERPL-SCNC: 139 MMOL/L (ref 136–144)
SP GR UR STRIP: 1.02 (ref 1–1.03)
UROBILINOGEN UR STRIP-ACNC: <2
VIT C UR-MCNC: 40 MG/DL
WBC # BLD AUTO: 17.6 X10(3) UL (ref 4–11)
WBC #/AREA URNS AUTO: 1 /HPF

## 2019-02-05 PROCEDURE — 80048 BASIC METABOLIC PNL TOTAL CA: CPT | Performed by: EMERGENCY MEDICINE

## 2019-02-05 PROCEDURE — 99212 OFFICE O/P EST SF 10 MIN: CPT

## 2019-02-05 PROCEDURE — 99214 OFFICE O/P EST MOD 30 MIN: CPT

## 2019-02-05 PROCEDURE — 99285 EMERGENCY DEPT VISIT HI MDM: CPT

## 2019-02-05 PROCEDURE — 85025 COMPLETE CBC W/AUTO DIFF WBC: CPT

## 2019-02-05 PROCEDURE — 80048 BASIC METABOLIC PNL TOTAL CA: CPT

## 2019-02-05 PROCEDURE — 81001 URINALYSIS AUTO W/SCOPE: CPT

## 2019-02-05 PROCEDURE — 36415 COLL VENOUS BLD VENIPUNCTURE: CPT

## 2019-02-05 PROCEDURE — 74177 CT ABD & PELVIS W/CONTRAST: CPT | Performed by: EMERGENCY MEDICINE

## 2019-02-05 PROCEDURE — 85025 COMPLETE CBC W/AUTO DIFF WBC: CPT | Performed by: EMERGENCY MEDICINE

## 2019-02-05 PROCEDURE — 81001 URINALYSIS AUTO W/SCOPE: CPT | Performed by: EMERGENCY MEDICINE

## 2019-02-05 PROCEDURE — 85060 BLOOD SMEAR INTERPRETATION: CPT

## 2019-02-05 RX ORDER — AMOXICILLIN AND CLAVULANATE POTASSIUM 875; 125 MG/1; MG/1
1 TABLET, FILM COATED ORAL 2 TIMES DAILY
Qty: 20 TABLET | Refills: 0 | Status: SHIPPED | OUTPATIENT
Start: 2019-02-05 | End: 2019-02-15

## 2019-02-05 RX ORDER — OMEPRAZOLE 20 MG/1
CAPSULE, DELAYED RELEASE ORAL
Refills: 3 | COMMUNITY
Start: 2019-01-09 | End: 2019-06-05

## 2019-02-05 RX ORDER — HYDROCODONE BITARTRATE AND ACETAMINOPHEN 5; 325 MG/1; MG/1
1 TABLET ORAL EVERY 6 HOURS PRN
Qty: 10 TABLET | Refills: 0 | Status: SHIPPED | OUTPATIENT
Start: 2019-02-05 | End: 2021-08-31 | Stop reason: ALTCHOICE

## 2019-02-05 NOTE — ED INITIAL ASSESSMENT (HPI)
Pt complaining of groin pain for a week. Pt states this started after shoveling. Pt denies any fever n/v/d. Pt denies any abd pain.

## 2019-02-05 NOTE — ED PROVIDER NOTES
Patient Seen in: [facility] AND [facility] Emergency Department    History   Patient presents with:  Abdomen/Flank Pain (GI/)    Stated Complaint: lower abd pain    HPI    72-year-old male with history of diverticulosis with complaints of about a week of worse Wt 83.9 kg   SpO2 96%   BMI 26.54 kg/m²         Physical Exam    Constitutional: Oriented to person, place, and time. Appears well-developed. No distress. Head: Normocephalic and atraumatic.    Eyes: Conjunctivae are normal. Pupils are equal, round, and Please view results for these tests on the individual orders.    SCAN SLIDE   RAINBOW DRAW BLUE   RAINBOW DRAW LAVENDER   RAINBOW DRAW DARK GREEN   RAINBOW DRAW LIGHT GREEN   RAINBOW DRAW GOLD   RAINBOW DRAW LAVENDER TALL (BNP)          Ct Abdomen Pe The appendix is surgically absent. The small bowel is of normal caliber without evidence of bowel wall thickening. The colon is of normal caliber. ABDOMINAL WALL: Normal.  No mass or hernia.  URINARY BLADDER: No visible focal wall thickening, lesion or ca pressure. Medications Prescribed:  Current Discharge Medication List    START taking these medications    HYDROcodone-acetaminophen 5-325 MG Oral Tab  Take 1 tablet by mouth every 6 (six) hours as needed.   Qty: 10 tablet Refills: 0    Amoxicillin-Pot Cl

## 2019-02-05 NOTE — ED INITIAL ASSESSMENT (HPI)
Patient here with c/o lower abdominal pain x 1 week. Denies n/v/d. Sent from [address] [place] [address] for further workup.

## 2019-02-05 NOTE — ED PROVIDER NOTES
Patient Seen in: [facility] Immediate Care In [place]    History   Patient presents with:  Groin Pain    Stated Complaint: abd pain    HPI    71 yo male with one week of groin pain. Thought that pain started after shoveling and snow blowing.  Does Current:/77   Pulse 69   Temp 97.8 °F (36.6 °C) (Oral)   Resp 18   Ht 177.8 cm (5' 10\")   Wt 83.9 kg   SpO2 97%   BMI 26.54 kg/m²         Physical Exam   Constitutional: He is oriented to person, place, and time.  He appears well-developed and

## 2019-02-12 ENCOUNTER — OFFICE VISIT (OUTPATIENT)
Dept: INTERNAL MEDICINE CLINIC | Facility: CLINIC | Age: 74
End: 2019-02-12
Payer: MEDICARE

## 2019-02-12 VITALS
SYSTOLIC BLOOD PRESSURE: 138 MMHG | HEIGHT: 70 IN | HEART RATE: 66 BPM | BODY MASS INDEX: 25.77 KG/M2 | RESPIRATION RATE: 16 BRPM | DIASTOLIC BLOOD PRESSURE: 60 MMHG | WEIGHT: 180 LBS

## 2019-02-12 DIAGNOSIS — Z12.5 SCREENING FOR PROSTATE CANCER: ICD-10-CM

## 2019-02-12 DIAGNOSIS — I10 ESSENTIAL HYPERTENSION WITH GOAL BLOOD PRESSURE LESS THAN 140/90: ICD-10-CM

## 2019-02-12 DIAGNOSIS — Z00.00 PHYSICAL EXAM, ANNUAL: Primary | ICD-10-CM

## 2019-02-12 PROCEDURE — G0439 PPPS, SUBSEQ VISIT: HCPCS | Performed by: INTERNAL MEDICINE

## 2019-02-12 RX ORDER — LISINOPRIL AND HYDROCHLOROTHIAZIDE 12.5; 1 MG/1; MG/1
1 TABLET ORAL DAILY
Qty: 90 TABLET | Refills: 3 | Status: SHIPPED | OUTPATIENT
Start: 2019-02-12 | End: 2020-04-20

## 2019-02-12 NOTE — PROGRESS NOTES
REASON FOR VISIT:    Rachele Tran is a 72year old male who presents for a Medicare Annual Wellness visit.        Patient Care Team: Patient Care Team:  Jasmyn Cordova MD as PCP - General (Internal Medicine)  Shannen Rodriguez MD as PCP - [facility] 0. 89 1.03 0.91 0.91 0.94 0.86   Some recent data might be hidden     AST and ALT Latest Ref Rng & Units 4/12/2018 3/7/2017 3/19/2015 2/17/2014 10/24/2012 4/26/2011   AST 15 - 41 U/L 20 21 20 19 18 21   ALT 17 - 63 U/L 19 21 20 21 20 23     TSH and Free T4 doing things (over the last two weeks)?: Not at all  Feeling down, depressed, or hopeless (over the last two weeks)?: Not at all  PHQ-2 SCORE: 0        Advance Directives     Do you have a healthcare power of ?: Yes  Do you have a living will?: Yes N/A 7/3/2018    Performed by David Minaya MD at [facility] ENDOSCOPY   • EGD     • ELECTROCARDIOGRAM, COMPLETE  02/17/2014    Scanned to media tab    • ESOPHAGOGASTRODUODENOSCOPY (EGD) N/A 10/10/2018    Performed by David Minaya MD at [address] tenderness  LUNGS: clear to auscultation  CARDIO: RRR without murmur  GI: good BS's, no masses, HSM or tenderness  : two descended testicles, no masses, no hernia and no penile lesions  RECTAL: normal rectal tone, prostate shows no masses, non tender  MU performed    SUGGESTED VACCINATIONS - Influenza, Pneumococcal, Zoster, Tetanus   Influenza: Influenza Vaccine(1) due on 09/01/2018  Pneumonia: Pneumococcal vaccination(1 of 2 - PCV13) due on 12/06/2012  Shingrix shingles vaccine is due

## 2019-04-01 ENCOUNTER — TELEPHONE (OUTPATIENT)
Dept: DERMATOLOGY CLINIC | Facility: CLINIC | Age: 74
End: 2019-04-01

## 2019-04-01 NOTE — TELEPHONE ENCOUNTER
Per Dr. Archibald Cue office patient contacted regarding procedure scheduled in February that was cancelled. Pt told them today \" procedure will be a waste of his time\". Refused to schedule appt.  LINCOLN

## 2019-04-01 NOTE — TELEPHONE ENCOUNTER
Please call patient and let him know that Dr. Shen Held office contacted us to let him know the patient never had the procedure done. Let him know that this was a skin cancer and it is important to have it completely excised.   If not, it will continue to

## 2019-04-01 NOTE — TELEPHONE ENCOUNTER
Dr. Yvette Brody please see below - pt was to see Dr. Bill Biggs for a [address] to left nose confirmed with biopsy on 11/26/18.  Attempted to contact pt - LMTCB

## 2019-04-03 NOTE — TELEPHONE ENCOUNTER
Refusal of treatment noted- but would strongly recommend that pt make appt for full body check, or at least recheck of face

## 2019-04-03 NOTE — TELEPHONE ENCOUNTER
disccussed with pt in detail, risks explained, but patient states if he notices that it's growing back he will be back to see Dr. Regi Chery - he refuses to follow up for treatment now.

## 2019-04-22 RX ORDER — OMEPRAZOLE 20 MG/1
CAPSULE, DELAYED RELEASE ORAL
Qty: 90 CAPSULE | Refills: 1 | Status: SHIPPED | OUTPATIENT
Start: 2019-04-22 | End: 2020-01-16 | Stop reason: ALTCHOICE

## 2019-04-22 NOTE — TELEPHONE ENCOUNTER
Spoke to wife, who is not on IAM, and she stated her  is on Omeprazole and Pantoprazole (which was prescribed by GI). Would you like to fill script for Omeprazole ?

## 2019-05-08 ENCOUNTER — LAB ENCOUNTER (OUTPATIENT)
Dept: LAB | Age: 74
End: 2019-05-08
Attending: INTERNAL MEDICINE
Payer: MEDICARE

## 2019-05-08 DIAGNOSIS — Z12.5 SCREENING FOR PROSTATE CANCER: ICD-10-CM

## 2019-05-08 DIAGNOSIS — I10 ESSENTIAL HYPERTENSION WITH GOAL BLOOD PRESSURE LESS THAN 140/90: ICD-10-CM

## 2019-05-08 PROCEDURE — 80053 COMPREHEN METABOLIC PANEL: CPT

## 2019-05-08 PROCEDURE — 80061 LIPID PANEL: CPT

## 2019-05-08 PROCEDURE — 36415 COLL VENOUS BLD VENIPUNCTURE: CPT

## 2019-05-08 PROCEDURE — 85025 COMPLETE CBC W/AUTO DIFF WBC: CPT

## 2019-05-08 PROCEDURE — 85060 BLOOD SMEAR INTERPRETATION: CPT

## 2019-05-08 PROCEDURE — 84443 ASSAY THYROID STIM HORMONE: CPT

## 2019-05-20 ENCOUNTER — TELEPHONE (OUTPATIENT)
Dept: INTERNAL MEDICINE CLINIC | Facility: CLINIC | Age: 74
End: 2019-05-20

## 2019-05-20 NOTE — TELEPHONE ENCOUNTER
Pt called in requesting to have a copy of his lab results from 5/8 mailed to his home address.   Address Verified

## 2019-05-24 ENCOUNTER — LAB ENCOUNTER (OUTPATIENT)
Dept: LAB | Age: 74
End: 2019-05-24
Attending: PHYSICIAN ASSISTANT
Payer: MEDICARE

## 2019-05-24 DIAGNOSIS — D64.9 ANEMIA, UNSPECIFIED TYPE: ICD-10-CM

## 2019-05-24 DIAGNOSIS — E55.9 VITAMIN D DEFICIENCY: ICD-10-CM

## 2019-05-24 DIAGNOSIS — R74.8 ELEVATED LIVER ENZYMES: ICD-10-CM

## 2019-05-24 PROCEDURE — 85025 COMPLETE CBC W/AUTO DIFF WBC: CPT

## 2019-05-24 PROCEDURE — 82306 VITAMIN D 25 HYDROXY: CPT

## 2019-05-24 PROCEDURE — 36415 COLL VENOUS BLD VENIPUNCTURE: CPT

## 2019-05-24 PROCEDURE — 80076 HEPATIC FUNCTION PANEL: CPT

## 2019-06-05 ENCOUNTER — LAB ENCOUNTER (OUTPATIENT)
Dept: LAB | Age: 74
End: 2019-06-05
Attending: INTERNAL MEDICINE
Payer: MEDICARE

## 2019-06-05 ENCOUNTER — OFFICE VISIT (OUTPATIENT)
Dept: INTERNAL MEDICINE CLINIC | Facility: CLINIC | Age: 74
End: 2019-06-05
Payer: MEDICARE

## 2019-06-05 VITALS
RESPIRATION RATE: 16 BRPM | HEIGHT: 70 IN | BODY MASS INDEX: 26.48 KG/M2 | HEART RATE: 76 BPM | WEIGHT: 185 LBS | DIASTOLIC BLOOD PRESSURE: 79 MMHG | SYSTOLIC BLOOD PRESSURE: 133 MMHG

## 2019-06-05 DIAGNOSIS — E55.9 VITAMIN D DEFICIENCY: ICD-10-CM

## 2019-06-05 DIAGNOSIS — D50.9 MICROCYTIC ANEMIA: Primary | ICD-10-CM

## 2019-06-05 DIAGNOSIS — I10 ESSENTIAL HYPERTENSION WITH GOAL BLOOD PRESSURE LESS THAN 140/90: ICD-10-CM

## 2019-06-05 DIAGNOSIS — D50.9 MICROCYTIC ANEMIA: ICD-10-CM

## 2019-06-05 PROCEDURE — G0463 HOSPITAL OUTPT CLINIC VISIT: HCPCS | Performed by: INTERNAL MEDICINE

## 2019-06-05 PROCEDURE — 83540 ASSAY OF IRON: CPT

## 2019-06-05 PROCEDURE — 82746 ASSAY OF FOLIC ACID SERUM: CPT

## 2019-06-05 PROCEDURE — 84466 ASSAY OF TRANSFERRIN: CPT

## 2019-06-05 PROCEDURE — 85025 COMPLETE CBC W/AUTO DIFF WBC: CPT

## 2019-06-05 PROCEDURE — 82728 ASSAY OF FERRITIN: CPT

## 2019-06-05 PROCEDURE — 36415 COLL VENOUS BLD VENIPUNCTURE: CPT

## 2019-06-05 PROCEDURE — 82607 VITAMIN B-12: CPT

## 2019-06-05 PROCEDURE — 99214 OFFICE O/P EST MOD 30 MIN: CPT | Performed by: INTERNAL MEDICINE

## 2019-06-05 RX ORDER — ERGOCALCIFEROL 1.25 MG/1
50000 CAPSULE ORAL WEEKLY
Qty: 12 CAPSULE | Refills: 0 | Status: SHIPPED | OUTPATIENT
Start: 2019-06-05

## 2019-06-05 NOTE — PROGRESS NOTES
Haseeb Bryant. is a 72year old male. HPI:   1. Microcytic anemia    Has developed a microcyutic anemia and elevated platelets with elevated WBC over the last few months since having diverticulitis of colon earlier in the year.    No fever or chills curr left nose   • Esophageal reflux    • Hearing impairment    • Lipid screening 02/17/2014   • Psoriasis    • Unspecified essential hypertension       Social History:  Social History    Tobacco Use      Smoking status: Former Smoker        Quit date: 1/1/1972 sun exposure is needed to activate the vitamin D to make it more effective in affecting bone metabolism and overall health. Has NOT started the vitamin D that was ordered 3 months ago doris patient    3.  Essential hypertension with goal blood pressure less

## 2019-06-06 ENCOUNTER — TELEPHONE (OUTPATIENT)
Dept: INTERNAL MEDICINE CLINIC | Facility: CLINIC | Age: 74
End: 2019-06-06

## 2019-06-06 NOTE — TELEPHONE ENCOUNTER
Informed wife (IAM) of pt's lab results who stated her  took his first Vitamin D pill yesterday, and within an hour developed some agitation, racing heart, and anxiety.   Wife states these symptoms went away within two hours, and wonders if this coul

## 2019-10-21 RX ORDER — PANTOPRAZOLE SODIUM 40 MG/1
TABLET, DELAYED RELEASE ORAL
Qty: 90 TABLET | Refills: 0 | Status: SHIPPED | OUTPATIENT
Start: 2019-10-21 | End: 2020-01-16

## 2019-10-21 NOTE — TELEPHONE ENCOUNTER
GI RN staff: Please contact the patient. I have refilled the pantoprazole for 3 months. He should take this on a daily basis. He should be seen in the office in follow-up. Alternatively the prescription could be filled by the patient's PCP.   Patient pr

## 2019-10-21 NOTE — TELEPHONE ENCOUNTER
Requested Prescriptions     Pending Prescriptions Disp Refills   • PANTOPRAZOLE SODIUM 40 MG Oral Tab EC [Pharmacy Med Name: PANTOPRAZOLE 40MG TABLETS] 90 tablet 0     Sig: TAKE 1 TABLET(40 MG) BY MOUTH EVERY MORNING BEFORE BREAKFAST     Last seen EGD 10/1

## 2019-12-10 ENCOUNTER — OFFICE VISIT (OUTPATIENT)
Dept: INTERNAL MEDICINE CLINIC | Facility: CLINIC | Age: 74
End: 2019-12-10
Payer: MEDICARE

## 2019-12-10 ENCOUNTER — LAB ENCOUNTER (OUTPATIENT)
Dept: LAB | Age: 74
End: 2019-12-10
Attending: INTERNAL MEDICINE
Payer: MEDICARE

## 2019-12-10 VITALS
SYSTOLIC BLOOD PRESSURE: 126 MMHG | HEART RATE: 83 BPM | BODY MASS INDEX: 26.63 KG/M2 | HEIGHT: 70 IN | RESPIRATION RATE: 16 BRPM | DIASTOLIC BLOOD PRESSURE: 75 MMHG | WEIGHT: 186 LBS

## 2019-12-10 DIAGNOSIS — E55.9 VITAMIN D DEFICIENCY: ICD-10-CM

## 2019-12-10 DIAGNOSIS — D50.9 MICROCYTIC ANEMIA: Primary | ICD-10-CM

## 2019-12-10 DIAGNOSIS — I10 ESSENTIAL HYPERTENSION WITH GOAL BLOOD PRESSURE LESS THAN 140/90: ICD-10-CM

## 2019-12-10 DIAGNOSIS — D50.9 MICROCYTIC ANEMIA: ICD-10-CM

## 2019-12-10 DIAGNOSIS — Z23 NEED FOR VACCINATION: ICD-10-CM

## 2019-12-10 PROCEDURE — 83540 ASSAY OF IRON: CPT

## 2019-12-10 PROCEDURE — 82607 VITAMIN B-12: CPT

## 2019-12-10 PROCEDURE — 82746 ASSAY OF FOLIC ACID SERUM: CPT

## 2019-12-10 PROCEDURE — 85007 BL SMEAR W/DIFF WBC COUNT: CPT

## 2019-12-10 PROCEDURE — 82728 ASSAY OF FERRITIN: CPT

## 2019-12-10 PROCEDURE — G0463 HOSPITAL OUTPT CLINIC VISIT: HCPCS | Performed by: INTERNAL MEDICINE

## 2019-12-10 PROCEDURE — 99214 OFFICE O/P EST MOD 30 MIN: CPT | Performed by: INTERNAL MEDICINE

## 2019-12-10 PROCEDURE — 36415 COLL VENOUS BLD VENIPUNCTURE: CPT

## 2019-12-10 PROCEDURE — 85027 COMPLETE CBC AUTOMATED: CPT

## 2019-12-10 PROCEDURE — 84466 ASSAY OF TRANSFERRIN: CPT

## 2019-12-10 PROCEDURE — 85025 COMPLETE CBC W/AUTO DIFF WBC: CPT

## 2019-12-10 NOTE — PROGRESS NOTES
Carlo Purcell. is a 69year old male. HPI:   1. Microcytic anemia    Has developed a microcyutic anemia and elevated platelets with elevated WBC over the last few months since having diverticulitis of colon earlier in the year.    No fever or chills curr Delayed Release TAKE 1 CAPSULE(20 MG) BY MOUTH EVERY MORNING (Patient not taking: Reported on 12/10/2019) 90 capsule 1   • HYDROcodone-acetaminophen 5-325 MG Oral Tab Take 1 tablet by mouth every 6 (six) hours as needed.  (Patient not taking: Reported on 12 range for possible bone marrow issue.     - VITAMIN B12; Future  - FERRITIN; Future  - IRON AND TIBC; Future  - FOLIC ACID SERUM(FOLATE); Future  - CBC WITH DIFFERENTIAL WITH PLATELET; Future    2.  Vitamin D deficiency    Has been deficient in vitamin D fo

## 2020-01-16 ENCOUNTER — OFFICE VISIT (OUTPATIENT)
Dept: GASTROENTEROLOGY | Facility: CLINIC | Age: 75
End: 2020-01-16
Payer: MEDICARE

## 2020-01-16 VITALS
SYSTOLIC BLOOD PRESSURE: 144 MMHG | HEART RATE: 52 BPM | HEIGHT: 70 IN | WEIGHT: 190 LBS | DIASTOLIC BLOOD PRESSURE: 78 MMHG | BODY MASS INDEX: 27.2 KG/M2

## 2020-01-16 DIAGNOSIS — K21.00 GASTROESOPHAGEAL REFLUX DISEASE WITH ESOPHAGITIS: ICD-10-CM

## 2020-01-16 DIAGNOSIS — K57.32 DIVERTICULITIS LARGE INTESTINE W/O PERFORATION OR ABSCESS W/O BLEEDING: ICD-10-CM

## 2020-01-16 DIAGNOSIS — D50.9 IRON DEFICIENCY ANEMIA, UNSPECIFIED IRON DEFICIENCY ANEMIA TYPE: Primary | ICD-10-CM

## 2020-01-16 PROCEDURE — G0463 HOSPITAL OUTPT CLINIC VISIT: HCPCS | Performed by: INTERNAL MEDICINE

## 2020-01-16 PROCEDURE — 99213 OFFICE O/P EST LOW 20 MIN: CPT | Performed by: INTERNAL MEDICINE

## 2020-01-16 RX ORDER — PANTOPRAZOLE SODIUM 40 MG/1
TABLET, DELAYED RELEASE ORAL
Qty: 90 TABLET | Refills: 3 | Status: SHIPPED | OUTPATIENT
Start: 2020-01-16 | End: 2021-05-24

## 2020-01-16 NOTE — PROGRESS NOTES
HPI:    Patient ID: Negrita Zendejas is a 69year old male. HPI  Mr. Yohan Casarez returns in follow-up. He was last seen at endoscopy in November 2018.     As per previous notes the patient underwent upper and lower endoscopy in July 2018 for a personal histor normalized at 14.8 with a hematocrit of 45.3. The patient has been well. His appetite is good. He notes no symptoms of dysphagia or \"spasms\". He has no heartburn. He denies current abdominal pain. His stools are loose \"once in a while\".   He not regular rhythm and normal heart sounds. No murmur heard. Pulmonary/Chest: Effort normal and breath sounds normal. No respiratory distress. He has no wheezes. He has no rales. Abdominal: Soft.  Bowel sounds are normal. He exhibits no distension and no m obtained. Repeat endoscopic evaluation would be advised for a recurrent/progressive anemia or any gastrointestinal tract symptoms or signs.   The patient's pantoprazole will be refilled at his request.    Personal history of adenomatous colon polyps  The p

## 2020-03-26 ENCOUNTER — TELEPHONE (OUTPATIENT)
Dept: GASTROENTEROLOGY | Facility: CLINIC | Age: 75
End: 2020-03-26

## 2020-03-26 NOTE — TELEPHONE ENCOUNTER
Overdue results letter mailed out for the following:    CBC W Differential W Platelet      Ferritin      Iron And Tibc      Tissue Transglutaminase Ab, IgA      Immunoglobulin A, Qn, Serum (IGA)

## 2020-04-20 RX ORDER — LISINOPRIL AND HYDROCHLOROTHIAZIDE 12.5; 1 MG/1; MG/1
1 TABLET ORAL DAILY
Qty: 90 TABLET | Refills: 3 | Status: SHIPPED | OUTPATIENT
Start: 2020-04-20 | End: 2021-08-31

## 2020-08-19 NOTE — TELEPHONE ENCOUNTER
90 day supply    This medication is on backorder with no release date. Please contact us with an alternative.     Current Outpatient Medications:   •  LISINOPRIL-HYDROCHLOROTHIAZIDE 10-12.5 MG Oral Tab, TAKE 1 TABLET BY MOUTH DAILY, Disp: 90 tablet, Rfl: 3

## 2020-08-20 RX ORDER — LISINOPRIL 10 MG/1
10 TABLET ORAL DAILY
Qty: 90 TABLET | Refills: 3 | Status: SHIPPED | OUTPATIENT
Start: 2020-08-20 | End: 2021-08-26

## 2020-08-20 RX ORDER — HYDROCHLOROTHIAZIDE 12.5 MG/1
12.5 CAPSULE, GELATIN COATED ORAL DAILY
Qty: 90 CAPSULE | Refills: 3 | Status: SHIPPED | OUTPATIENT
Start: 2020-08-20 | End: 2021-08-31 | Stop reason: ALTCHOICE

## 2020-12-28 ENCOUNTER — OFFICE VISIT (OUTPATIENT)
Dept: INTERNAL MEDICINE CLINIC | Facility: CLINIC | Age: 75
End: 2020-12-28
Payer: MEDICARE

## 2020-12-28 VITALS
RESPIRATION RATE: 16 BRPM | HEART RATE: 63 BPM | SYSTOLIC BLOOD PRESSURE: 150 MMHG | HEIGHT: 70 IN | WEIGHT: 184 LBS | DIASTOLIC BLOOD PRESSURE: 78 MMHG | BODY MASS INDEX: 26.34 KG/M2

## 2020-12-28 DIAGNOSIS — M54.12 ACUTE CERVICAL RADICULOPATHY: Primary | ICD-10-CM

## 2020-12-28 DIAGNOSIS — E55.9 VITAMIN D DEFICIENCY: ICD-10-CM

## 2020-12-28 DIAGNOSIS — I10 ESSENTIAL HYPERTENSION WITH GOAL BLOOD PRESSURE LESS THAN 140/90: ICD-10-CM

## 2020-12-28 PROCEDURE — 99214 OFFICE O/P EST MOD 30 MIN: CPT | Performed by: INTERNAL MEDICINE

## 2020-12-28 PROCEDURE — G0463 HOSPITAL OUTPT CLINIC VISIT: HCPCS | Performed by: INTERNAL MEDICINE

## 2020-12-28 NOTE — PROGRESS NOTES
Patricio Mchugh. is a 70year old male. HPI:   1. Left Arm pain    Has been a left arm  pain and is noticing the pain in the left biceps and going down the left arm .  Wakes up with numbness in the fingers when you wake up is cramping some crmping of 4th f (Patient not taking: Reported on 12/28/2020 ) 90 capsule 3   • Ferrous Sulfate 324 (65 Fe) MG Oral Tab EC Take one pill twice daily (Patient not taking: Reported on 12/28/2020 ) 60 tablet 2   • ergocalciferol 01532 units Oral Cap Take 1 capsule (50,000 Uni adenopathy,no bruits  LUNGS: clear to auscultation  CARDIO: RRR without murmur  GI: good BS's,no masses, HSM or tenderness  EXTREMITIES: no cyanosis, clubbing or edema    ASSESSMENT AND PLAN:   1.  Acute cervical radiculopathy    Initally Ice the neck area of these issues and agrees to the plan.   The patient is asked to return in 6 months

## 2020-12-29 PROBLEM — M54.12 ACUTE CERVICAL RADICULOPATHY: Status: ACTIVE | Noted: 2020-12-29

## 2021-01-07 ENCOUNTER — HOSPITAL ENCOUNTER (OUTPATIENT)
Dept: GENERAL RADIOLOGY | Age: 76
Discharge: HOME | End: 2021-01-07
Attending: INTERNAL MEDICINE
Payer: MEDICARE

## 2021-01-07 DIAGNOSIS — M54.12 ACUTE CERVICAL RADICULOPATHY: ICD-10-CM

## 2021-01-07 PROCEDURE — 72050 X-RAY EXAM NECK SPINE 4/5VWS: CPT | Performed by: INTERNAL MEDICINE

## 2021-02-13 DIAGNOSIS — Z23 NEED FOR VACCINATION: ICD-10-CM

## 2021-02-19 RX ORDER — OMEPRAZOLE 20 MG/1
CAPSULE, DELAYED RELEASE ORAL
Qty: 90 CAPSULE | Refills: 1 | Status: SHIPPED | OUTPATIENT
Start: 2021-02-19 | End: 2021-05-24 | Stop reason: ALTCHOICE

## 2021-02-22 RX ORDER — PANTOPRAZOLE SODIUM 40 MG/1
40 TABLET, DELAYED RELEASE ORAL DAILY
Qty: 90 TABLET | Refills: 0 | Status: SHIPPED | OUTPATIENT
Start: 2021-02-22 | End: 2021-03-30

## 2021-02-22 NOTE — TELEPHONE ENCOUNTER
Francheska    Request received for refill of Pantoprazole. Please review and sign below pended order if appropriate.     LOV 1/16/2020  LR 1/16/2020    I spoke to patient today because:  Patient is on omeprazole 20mg daily ordered through PCP and requesting

## 2021-02-22 NOTE — TELEPHONE ENCOUNTER
Pharmacy call for refill:       Current Outpatient Medications:     •  Pantoprazole Sodium 40 MG Oral Tab EC, TAKE 1 TABLET(40 MG) BY MOUTH EVERY MORNING BEFORE BREAKFAST, Disp: 90 tablet, Rfl: 3

## 2021-03-29 ENCOUNTER — NURSE TRIAGE (OUTPATIENT)
Dept: INTERNAL MEDICINE CLINIC | Facility: CLINIC | Age: 76
End: 2021-03-29

## 2021-03-29 NOTE — TELEPHONE ENCOUNTER
Action Requested: Summary for Provider     []  Critical Lab, Recommendations Needed  [] Need Additional Advice  []   FYI    []   Need Orders  [] Need Medications Sent to Pharmacy  []  Other     SUMMARY: Patient reports left heel pain 2/10 after wearing new

## 2021-03-30 ENCOUNTER — OFFICE VISIT (OUTPATIENT)
Dept: INTERNAL MEDICINE CLINIC | Facility: CLINIC | Age: 76
End: 2021-03-30
Payer: COMMERCIAL

## 2021-03-30 VITALS
BODY MASS INDEX: 26.2 KG/M2 | HEIGHT: 70 IN | SYSTOLIC BLOOD PRESSURE: 167 MMHG | WEIGHT: 183 LBS | DIASTOLIC BLOOD PRESSURE: 91 MMHG | HEART RATE: 79 BPM

## 2021-03-30 DIAGNOSIS — I10 ESSENTIAL HYPERTENSION WITH GOAL BLOOD PRESSURE LESS THAN 140/90: ICD-10-CM

## 2021-03-30 DIAGNOSIS — M79.672 PAIN OF LEFT HEEL: Primary | ICD-10-CM

## 2021-03-30 DIAGNOSIS — D50.9 MICROCYTIC ANEMIA: ICD-10-CM

## 2021-03-30 DIAGNOSIS — E55.9 VITAMIN D DEFICIENCY: ICD-10-CM

## 2021-03-30 PROCEDURE — 3008F BODY MASS INDEX DOCD: CPT | Performed by: INTERNAL MEDICINE

## 2021-03-30 PROCEDURE — 3080F DIAST BP >= 90 MM HG: CPT | Performed by: INTERNAL MEDICINE

## 2021-03-30 PROCEDURE — 3077F SYST BP >= 140 MM HG: CPT | Performed by: INTERNAL MEDICINE

## 2021-03-30 PROCEDURE — 99214 OFFICE O/P EST MOD 30 MIN: CPT | Performed by: INTERNAL MEDICINE

## 2021-04-06 NOTE — PROGRESS NOTES
Carlo Purcell. is a 70year old male. HPI:   1. Pain of left heel    The patient has been having increasing pain in his left heel over the last several days. There was no trauma to the area and it just aches when he walks on the foot.   He has not had t Oral Tab EC TAKE 1 TABLET(40 MG) BY MOUTH EVERY MORNING BEFORE BREAKFAST 90 tablet 3   • ergocalciferol 11888 units Oral Cap Take 1 capsule (50,000 Units total) by mouth once a week.  12 capsule 0   • naproxen (NAPROSYN) 500 MG Oral Tab Take 1 tablet (500 m murmur  GI: good BS's,no masses, HSM or tenderness  EXTREMITIES: no cyanosis, clubbing or edema    ASSESSMENT AND PLAN:   1.  Pain of left heel    Ice joint and elevate area intermittently as tolerated for short periods of time to decrease any inflammation

## 2021-05-21 ENCOUNTER — TELEPHONE (OUTPATIENT)
Dept: GASTROENTEROLOGY | Facility: CLINIC | Age: 76
End: 2021-05-21

## 2021-05-21 NOTE — TELEPHONE ENCOUNTER
Current Outpatient Medications   Medication Sig Dispense Refill   • Pantoprazole Sodium 40 MG Oral Tab EC TAKE 1 TABLET(40 MG) BY MOUTH EVERY MORNING BEFORE BREAKFAST 90 tablet 3

## 2021-05-24 RX ORDER — PANTOPRAZOLE SODIUM 40 MG/1
TABLET, DELAYED RELEASE ORAL
Qty: 90 TABLET | Refills: 0 | Status: SHIPPED | OUTPATIENT
Start: 2021-05-24 | End: 2021-08-31 | Stop reason: ALTCHOICE

## 2021-05-24 NOTE — TELEPHONE ENCOUNTER
[facility] pharmacy called in to follow up on the refill request. Pharmacy states it is urgent.  Please follow up

## 2021-05-24 NOTE — TELEPHONE ENCOUNTER
Nursing: It looks like in previous notes that the patient was taking pantoprazole and doing well on this. I am happy to provide a short-term refill until the time of follow-up visit with our office.   If the patient prefers to manage with his PCP and switc

## 2021-05-24 NOTE — TELEPHONE ENCOUNTER
Yulia Banda called back. I explained that he is currently prescribed two different PPI medications & asked which he finds better relief with and that he should not be taking both. He would like to continue taking Pantoprazole.     Pantoprazole Sodi

## 2021-05-24 NOTE — TELEPHONE ENCOUNTER
Phone room staff-    Please assist patient in scheduling an office visit with Amrita MURRAY within the next 3 months for follow up visit. Thank you!

## 2021-05-24 NOTE — TELEPHONE ENCOUNTER
Noted and appreciated. Nursing: I have sent Rx x 90 days.  Pt should be seen in office non urgently for routine f/u

## 2021-05-24 NOTE — TELEPHONE ENCOUNTER
Sandor Adam-    I called patient's pharmacy to clarify what is needed. States patient is requesting Pantoprazole refill, but PCP is prescribing Omeprazole.      Per previous TE documentation stating patient reports taking Pantoprazole occasionally at night (rare

## 2021-06-11 NOTE — TELEPHONE ENCOUNTER
I followed up on this and reach out to [employer]. Patient states, \"it will probably be several weeks. I need to get my stuff together and have things going on. \"    Loya will call when he is ready to schedule.  He is aware that Pantoprazole was filled x9

## 2021-08-26 ENCOUNTER — TELEPHONE (OUTPATIENT)
Dept: INTERNAL MEDICINE CLINIC | Facility: CLINIC | Age: 76
End: 2021-08-26

## 2021-08-26 RX ORDER — LISINOPRIL 10 MG/1
10 TABLET ORAL DAILY
Qty: 90 TABLET | Refills: 0 | Status: SHIPPED | OUTPATIENT
Start: 2021-08-26 | End: 2021-08-31 | Stop reason: ALTCHOICE

## 2021-08-26 NOTE — TELEPHONE ENCOUNTER
Protocol failed or has No Protocol, please review  Requested Prescriptions   Pending Prescriptions Disp Refills    lisinopril 10 MG Oral Tab 90 tablet 3     Sig: Take 1 tablet (10 mg total) by mouth daily.         Hypertensive Medications Protocol Failed -

## 2021-08-26 NOTE — TELEPHONE ENCOUNTER
Current Outpatient Medications:   ••  lisinopril 10 MG Oral Tab, Take 1 tablet (10 mg total) by mouth daily. , Disp: 90 tablet, Rfl: 3

## 2021-08-30 NOTE — TELEPHONE ENCOUNTER
Dr. Roby Hopkins - Pharmacy clarified question (disregard note prior to this one). They are asking is it a combination medication of lisinopril-hydrochlorothiazide or just lisinopril? On 8/26/21: Lisinopril only had been sent to pharmacy.    In August 2020

## 2021-08-30 NOTE — TELEPHONE ENCOUNTER
Dr. Clarissa Abraham - Okay to refill lisinopril, 3 months supply? Or wait until office visit tomorrow? Please advise. Please reply to amalia: EM RN TRIAGE    RN/CMA - Please call back [facility] with response.     [facility] calling our office, following-up

## 2021-08-31 ENCOUNTER — OFFICE VISIT (OUTPATIENT)
Dept: INTERNAL MEDICINE CLINIC | Facility: CLINIC | Age: 76
End: 2021-08-31
Payer: COMMERCIAL

## 2021-08-31 VITALS — WEIGHT: 180 LBS | HEIGHT: 70 IN | BODY MASS INDEX: 25.77 KG/M2

## 2021-08-31 DIAGNOSIS — I10 PRIMARY HYPERTENSION: ICD-10-CM

## 2021-08-31 DIAGNOSIS — C44.519 BASAL CELL CARCINOMA OF CHEST: ICD-10-CM

## 2021-08-31 DIAGNOSIS — D50.9 MICROCYTIC ANEMIA: ICD-10-CM

## 2021-08-31 DIAGNOSIS — Z00.00 ENCOUNTER FOR ANNUAL HEALTH EXAMINATION: Primary | ICD-10-CM

## 2021-08-31 DIAGNOSIS — K29.50 CHRONIC GASTRITIS WITHOUT BLEEDING, UNSPECIFIED GASTRITIS TYPE: ICD-10-CM

## 2021-08-31 DIAGNOSIS — E55.9 VITAMIN D DEFICIENCY: ICD-10-CM

## 2021-08-31 DIAGNOSIS — Z12.11 COLON CANCER SCREENING: ICD-10-CM

## 2021-08-31 PROBLEM — L57.0 ACTINIC KERATOSIS: Status: RESOLVED | Noted: 2018-09-25 | Resolved: 2021-08-31

## 2021-08-31 PROBLEM — L98.9 NON-HEALING SKIN LESION OF NOSE: Status: RESOLVED | Noted: 2018-01-23 | Resolved: 2021-08-31

## 2021-08-31 PROBLEM — Z23 NEED FOR VACCINATION: Status: RESOLVED | Noted: 2019-12-10 | Resolved: 2021-08-31

## 2021-08-31 PROBLEM — E78.2 MIXED HYPERLIPIDEMIA: Status: RESOLVED | Noted: 2017-03-03 | Resolved: 2021-08-31

## 2021-08-31 PROBLEM — D48.5 NEOPLASM OF UNCERTAIN BEHAVIOR OF SKIN: Status: RESOLVED | Noted: 2018-09-25 | Resolved: 2021-08-31

## 2021-08-31 PROBLEM — M54.12 ACUTE CERVICAL RADICULOPATHY: Status: RESOLVED | Noted: 2020-12-29 | Resolved: 2021-08-31

## 2021-08-31 PROCEDURE — G0439 PPPS, SUBSEQ VISIT: HCPCS | Performed by: INTERNAL MEDICINE

## 2021-08-31 PROCEDURE — 99397 PER PM REEVAL EST PAT 65+ YR: CPT | Performed by: INTERNAL MEDICINE

## 2021-08-31 PROCEDURE — 96160 PT-FOCUSED HLTH RISK ASSMT: CPT | Performed by: INTERNAL MEDICINE

## 2021-08-31 PROCEDURE — 3008F BODY MASS INDEX DOCD: CPT | Performed by: INTERNAL MEDICINE

## 2021-08-31 RX ORDER — LISINOPRIL AND HYDROCHLOROTHIAZIDE 12.5; 1 MG/1; MG/1
1 TABLET ORAL DAILY
Qty: 90 TABLET | Refills: 3 | Status: SHIPPED | OUTPATIENT
Start: 2021-08-31

## 2021-08-31 RX ORDER — OMEPRAZOLE 20 MG/1
20 CAPSULE, DELAYED RELEASE ORAL
Qty: 90 CAPSULE | Refills: 1 | Status: SHIPPED | OUTPATIENT
Start: 2021-08-31 | End: 2021-09-22 | Stop reason: ALTCHOICE

## 2021-08-31 RX ORDER — OMEPRAZOLE 20 MG/1
20 CAPSULE, DELAYED RELEASE ORAL
COMMUNITY
End: 2021-08-31

## 2021-08-31 NOTE — PATIENT INSTRUCTIONS
Zuleyma Bell Jr.'s SCREENING SCHEDULE   Tests on this list are recommended by your physician but may not be covered, or covered at this frequency, by your insurer. Please check with your insurance carrier before scheduling to verify coverage.    PREVENT Each vaccine (Nirzknr13 & Syvllzoqy34) covered once after 65 Prevnar 13: -    Jrixfaktf30: -     Pneumococcal Vaccination(1 of 1 - PPSV23) Never done    Hepatitis B One screening covered for patients with certain risk factors   -  No recommendations at [facility]

## 2021-08-31 NOTE — TELEPHONE ENCOUNTER
Called [facility] and informed of Layne Enriquez of Dr Radha Guillen response. No further action needed.

## 2021-08-31 NOTE — PROGRESS NOTES
HPI:   Donal Cazares. is a 70year old male who presents for a MA (Medicare Advantage) [address] [facility] (Once per calendar year). Patient presents for above. Here for Medicare annual wellness exam and to establish care after prior PCP retired.     History patient and Family/surrogate (if present), and forms available to patient in AVS     He does NOT have a Power of  for [employer] on file in [place].    Advance care planning including the explanation and discussion of advance directives standard forms breakfast.  ergocalciferol 64154 units Oral Cap, Take 1 capsule (50,000 Units total) by mouth once a week.        MEDICAL INFORMATION:   He  has a past medical history of Actinic keratosis (9/25/2018), Acute cervical radiculopathy (12/29/2020), BCC (basal c Ladonna Barkley, [facility]  Screening Method: Questionnaire  I have a problem hearing over the telephone: No I have trouble following the conversations when two or more people are talking at the same time: No   I have trouble understanding things on the TV: No I have to distension. Palpations: Abdomen is soft. Tenderness: There is no abdominal tenderness. There is no guarding or rebound. Musculoskeletal:         General: Normal range of motion. Cervical back: Normal range of motion.    Lymphadenopathy: understanding of these issues and agrees to the plan. Reinforced healthy diet, lifestyle, and exercise. Return in 6 months (on 2/28/2022).      Andrea Robert MD, 8/31/2021     General Health     In the past six months, have you lost more than 10 pounds wi Screening  Covered for ages 52-80; only need ONE of the following:    Colonoscopy   Covered every 10 years    Covered every 2 years if patient is at high risk or previous colonoscopy was abnormal 07/03/2018    Colonoscopy due on 07/03/2023    Flexible Sigm

## 2021-09-07 ENCOUNTER — LAB ENCOUNTER (OUTPATIENT)
Dept: LAB | Age: 76
End: 2021-09-07
Attending: INTERNAL MEDICINE
Payer: MEDICARE

## 2021-09-07 DIAGNOSIS — E55.9 VITAMIN D DEFICIENCY: ICD-10-CM

## 2021-09-07 DIAGNOSIS — I10 PRIMARY HYPERTENSION: ICD-10-CM

## 2021-09-07 DIAGNOSIS — Z00.00 ENCOUNTER FOR ANNUAL HEALTH EXAMINATION: ICD-10-CM

## 2021-09-07 DIAGNOSIS — D50.9 MICROCYTIC ANEMIA: ICD-10-CM

## 2021-09-07 LAB
ALBUMIN SERPL-MCNC: 3.6 G/DL (ref 3.4–5)
ALBUMIN/GLOB SERPL: 1.1 {RATIO} (ref 1–2)
ALP LIVER SERPL-CCNC: 114 U/L
ALT SERPL-CCNC: 26 U/L
ANION GAP SERPL CALC-SCNC: 5 MMOL/L (ref 0–18)
AST SERPL-CCNC: 17 U/L (ref 15–37)
BASOPHILS # BLD AUTO: 0.08 X10(3) UL (ref 0–0.2)
BASOPHILS NFR BLD AUTO: 0.9 %
BILIRUB SERPL-MCNC: 0.9 MG/DL (ref 0.1–2)
BUN BLD-MCNC: 14 MG/DL (ref 7–18)
BUN/CREAT SERPL: 14.9 (ref 10–20)
CALCIUM BLD-MCNC: 8.7 MG/DL (ref 8.5–10.1)
CHLORIDE SERPL-SCNC: 105 MMOL/L (ref 98–112)
CHOLEST SMN-MCNC: 164 MG/DL (ref ?–200)
CO2 SERPL-SCNC: 29 MMOL/L (ref 21–32)
CREAT BLD-MCNC: 0.94 MG/DL
CREAT UR-SCNC: 154 MG/DL
DEPRECATED RDW RBC AUTO: 46.5 FL (ref 35.1–46.3)
EOSINOPHIL # BLD AUTO: 1.56 X10(3) UL (ref 0–0.7)
EOSINOPHIL NFR BLD AUTO: 17.6 %
ERYTHROCYTE [DISTWIDTH] IN BLOOD BY AUTOMATED COUNT: 14.6 % (ref 11–15)
GLOBULIN PLAS-MCNC: 3.3 G/DL (ref 2.8–4.4)
GLUCOSE BLD-MCNC: 107 MG/DL (ref 70–99)
HCT VFR BLD AUTO: 42.9 %
HDLC SERPL-MCNC: 48 MG/DL (ref 40–59)
HGB BLD-MCNC: 14.2 G/DL
IMM GRANULOCYTES # BLD AUTO: 0.02 X10(3) UL (ref 0–1)
IMM GRANULOCYTES NFR BLD: 0.2 %
LDLC SERPL CALC-MCNC: 100 MG/DL (ref ?–100)
LYMPHOCYTES # BLD AUTO: 1.54 X10(3) UL (ref 1–4)
LYMPHOCYTES NFR BLD AUTO: 17.4 %
M PROTEIN MFR SERPL ELPH: 6.9 G/DL (ref 6.4–8.2)
MCH RBC QN AUTO: 28.7 PG (ref 26–34)
MCHC RBC AUTO-ENTMCNC: 33.1 G/DL (ref 31–37)
MCV RBC AUTO: 86.8 FL
MICROALBUMIN UR-MCNC: 3.78 MG/DL
MICROALBUMIN/CREAT 24H UR-RTO: 24.5 UG/MG (ref ?–30)
MONOCYTES # BLD AUTO: 0.51 X10(3) UL (ref 0.1–1)
MONOCYTES NFR BLD AUTO: 5.8 %
NEUTROPHILS # BLD AUTO: 5.13 X10 (3) UL (ref 1.5–7.7)
NEUTROPHILS # BLD AUTO: 5.13 X10(3) UL (ref 1.5–7.7)
NEUTROPHILS NFR BLD AUTO: 58.1 %
NONHDLC SERPL-MCNC: 116 MG/DL (ref ?–130)
OSMOLALITY SERPL CALC.SUM OF ELEC: 289 MOSM/KG (ref 275–295)
PATIENT FASTING Y/N/NP: YES
PATIENT FASTING Y/N/NP: YES
PLATELET # BLD AUTO: 298 10(3)UL (ref 150–450)
POTASSIUM SERPL-SCNC: 4 MMOL/L (ref 3.5–5.1)
PSA SERPL-MCNC: 0.95 NG/ML (ref ?–4)
RBC # BLD AUTO: 4.94 X10(6)UL
SODIUM SERPL-SCNC: 139 MMOL/L (ref 136–145)
TRIGL SERPL-MCNC: 87 MG/DL (ref 30–149)
TSI SER-ACNC: 2.56 MIU/ML (ref 0.36–3.74)
VIT D+METAB SERPL-MCNC: 37.9 NG/ML (ref 30–100)
VLDLC SERPL CALC-MCNC: 14 MG/DL (ref 0–30)
WBC # BLD AUTO: 8.8 X10(3) UL (ref 4–11)

## 2021-09-07 PROCEDURE — 85060 BLOOD SMEAR INTERPRETATION: CPT

## 2021-09-07 PROCEDURE — 84153 ASSAY OF PSA TOTAL: CPT

## 2021-09-07 PROCEDURE — 36415 COLL VENOUS BLD VENIPUNCTURE: CPT

## 2021-09-07 PROCEDURE — 82043 UR ALBUMIN QUANTITATIVE: CPT

## 2021-09-07 PROCEDURE — 82306 VITAMIN D 25 HYDROXY: CPT

## 2021-09-07 PROCEDURE — 85025 COMPLETE CBC W/AUTO DIFF WBC: CPT

## 2021-09-07 PROCEDURE — 80053 COMPREHEN METABOLIC PANEL: CPT

## 2021-09-07 PROCEDURE — 80061 LIPID PANEL: CPT

## 2021-09-07 PROCEDURE — 84443 ASSAY THYROID STIM HORMONE: CPT

## 2021-09-07 PROCEDURE — 82570 ASSAY OF URINE CREATININE: CPT

## 2021-09-08 ENCOUNTER — APPOINTMENT (OUTPATIENT)
Dept: URBAN - METROPOLITAN AREA CLINIC 321 | Age: 76
Setting detail: DERMATOLOGY
End: 2021-09-08

## 2021-09-08 DIAGNOSIS — L81.4 OTHER MELANIN HYPERPIGMENTATION: ICD-10-CM

## 2021-09-08 DIAGNOSIS — D22 MELANOCYTIC NEVI: ICD-10-CM

## 2021-09-08 DIAGNOSIS — Z85.828 PERSONAL HISTORY OF OTHER MALIGNANT NEOPLASM OF SKIN: ICD-10-CM

## 2021-09-08 DIAGNOSIS — L57.0 ACTINIC KERATOSIS: ICD-10-CM

## 2021-09-08 PROBLEM — D22.39 MELANOCYTIC NEVI OF OTHER PARTS OF FACE: Status: ACTIVE | Noted: 2021-09-08

## 2021-09-08 PROCEDURE — 99213 OFFICE O/P EST LOW 20 MIN: CPT | Mod: 25

## 2021-09-08 PROCEDURE — OTHER COUNSELING: OTHER

## 2021-09-08 PROCEDURE — OTHER LIQUID NITROGEN: OTHER

## 2021-09-08 PROCEDURE — 17003 DESTRUCT PREMALG LES 2-14: CPT

## 2021-09-08 PROCEDURE — 17000 DESTRUCT PREMALG LESION: CPT

## 2021-09-08 ASSESSMENT — LOCATION DETAILED DESCRIPTION DERM
LOCATION DETAILED: LEFT ULNAR DORSAL HAND
LOCATION DETAILED: RIGHT INFERIOR MEDIAL FOREHEAD
LOCATION DETAILED: RIGHT RADIAL DORSAL HAND
LOCATION DETAILED: NASAL DORSUM
LOCATION DETAILED: LEFT CENTRAL MALAR CHEEK
LOCATION DETAILED: NASAL TIP

## 2021-09-08 ASSESSMENT — LOCATION ZONE DERM
LOCATION ZONE: FACE
LOCATION ZONE: NOSE
LOCATION ZONE: HAND

## 2021-09-08 ASSESSMENT — LOCATION SIMPLE DESCRIPTION DERM
LOCATION SIMPLE: RIGHT FOREHEAD
LOCATION SIMPLE: NOSE
LOCATION SIMPLE: RIGHT HAND
LOCATION SIMPLE: LEFT HAND
LOCATION SIMPLE: LEFT CHEEK

## 2021-09-08 NOTE — PROCEDURE: LIQUID NITROGEN
Post-Care Instructions: I reviewed with the patient in detail post-care instructions. Patient is to wear sunprotection, and avoid picking at any of the treated lesions. Pt may apply Vaseline to crusted or scabbing areas.
Total Number Of Aks Treated: 8
Consent: The patient's consent was obtained including but not limited to risks of crusting, scabbing, blistering, scarring, darker or lighter pigmentary change, recurrence, incomplete removal and infection.
Render Post-Care Instructions In Note?: no
Duration Of Freeze Thaw-Cycle (Seconds): 0
Detail Level: Zone

## 2021-09-21 ENCOUNTER — TELEPHONE (OUTPATIENT)
Dept: GASTROENTEROLOGY | Facility: CLINIC | Age: 76
End: 2021-09-21

## 2021-09-21 NOTE — TELEPHONE ENCOUNTER
Pt asking for a refill of Pantoprazole - pharm wont give him a refill because Dr Debra Araujo gave him Omeprazole rx and they told him that was the same medicine

## 2021-09-22 RX ORDER — PANTOPRAZOLE SODIUM 40 MG/1
40 TABLET, DELAYED RELEASE ORAL
Qty: 90 TABLET | Refills: 0 | Status: CANCELLED | OUTPATIENT
Start: 2021-09-22 | End: 2021-10-22

## 2021-09-22 RX ORDER — PANTOPRAZOLE SODIUM 40 MG/1
40 TABLET, DELAYED RELEASE ORAL
Qty: 90 TABLET | Refills: 0 | Status: SHIPPED | OUTPATIENT
Start: 2021-09-22 | End: 2021-10-13

## 2021-09-22 NOTE — TELEPHONE ENCOUNTER
Please advise on message below. Thank you.     Omeprazole last refilled on 08/31/2021 by Dr. Ariella Birmingham #[id number] with 1 refill

## 2021-09-22 NOTE — TELEPHONE ENCOUNTER
Patient contacted and states Pantoprazole works better for him and asking for another refill please. Patient made f/u appointment with Bethanne Hammans on 10/13/2021 at 8 am.  Patient advised to come 15 minutes early and address given.   Patient voiced under

## 2021-09-22 NOTE — TELEPHONE ENCOUNTER
Nursing: If the patient would prefer, I can switch him back to pantoprazole however he is due for follow-up with our office which may be scheduled nonurgently

## 2021-10-11 NOTE — PROGRESS NOTES
[facility] Follow-up Visit    Yaima adenomatous colon polyps, colon cancer screening, GERD, dysphagia, findings: Cecal polyps, multiple colonic nodules benign in appearance, pancolonic diverticulosis, colonic lipoma, LA grade D esophagitis, slight esophageal stricture status post dilatation, Dispense Refill   • pantoprazole 40 MG Oral Tab EC Take 1 tablet (40 mg total) by mouth every morning before breakfast. 90 tablet 3   • Lisinopril-hydroCHLOROthiazide 10-12.5 MG Oral Tab Take 1 tablet by mouth daily.  90 tablet 3   • ergocalciferol 07525 un year old year-old male, patient of Dr. Clarissa Abraham with history of diverticulitis, colon polyps, colonic lipoma, esophageal stricture status post dilatation, GERD/hiatal hernia, BCC, hyperlipidemia, psoriasis, hypertension, who presents for follow-up        1.

## 2021-10-13 ENCOUNTER — OFFICE VISIT (OUTPATIENT)
Dept: GASTROENTEROLOGY | Facility: CLINIC | Age: 76
End: 2021-10-13
Payer: COMMERCIAL

## 2021-10-13 VITALS
BODY MASS INDEX: 25.91 KG/M2 | HEART RATE: 72 BPM | SYSTOLIC BLOOD PRESSURE: 128 MMHG | DIASTOLIC BLOOD PRESSURE: 70 MMHG | WEIGHT: 181 LBS | HEIGHT: 70 IN

## 2021-10-13 DIAGNOSIS — K21.9 HIATAL HERNIA WITH GERD: Primary | ICD-10-CM

## 2021-10-13 DIAGNOSIS — K44.9 HIATAL HERNIA WITH GERD: Primary | ICD-10-CM

## 2021-10-13 PROCEDURE — 3008F BODY MASS INDEX DOCD: CPT | Performed by: NURSE PRACTITIONER

## 2021-10-13 PROCEDURE — 99214 OFFICE O/P EST MOD 30 MIN: CPT | Performed by: NURSE PRACTITIONER

## 2021-10-13 PROCEDURE — 3078F DIAST BP <80 MM HG: CPT | Performed by: NURSE PRACTITIONER

## 2021-10-13 PROCEDURE — 3074F SYST BP LT 130 MM HG: CPT | Performed by: NURSE PRACTITIONER

## 2021-10-13 RX ORDER — PANTOPRAZOLE SODIUM 40 MG/1
40 TABLET, DELAYED RELEASE ORAL
Qty: 90 TABLET | Refills: 3 | Status: SHIPPED | OUTPATIENT
Start: 2021-10-13 | End: 2021-11-12

## 2021-12-29 ENCOUNTER — TELEPHONE (OUTPATIENT)
Dept: GASTROENTEROLOGY | Facility: CLINIC | Age: 76
End: 2021-12-29

## 2021-12-29 NOTE — TELEPHONE ENCOUNTER
Fax received from [employer] about \"Potential clinical concern: Therapeutic duplication of Omeprazole and Pantoprazole\"    PCP ordered omeprazole and Mariaelena ordered pantoprazole.   This was already discussed/resolved during patient's most recent of

## 2022-04-30 ENCOUNTER — HOSPITAL ENCOUNTER (OUTPATIENT)
Age: 77
Discharge: HOME | End: 2022-04-30
Payer: MEDICARE

## 2022-04-30 VITALS
SYSTOLIC BLOOD PRESSURE: 131 MMHG | RESPIRATION RATE: 18 BRPM | TEMPERATURE: 98 F | DIASTOLIC BLOOD PRESSURE: 75 MMHG | HEART RATE: 68 BPM | OXYGEN SATURATION: 95 %

## 2022-04-30 DIAGNOSIS — B34.9 VIRAL ILLNESS: Primary | ICD-10-CM

## 2022-05-02 ENCOUNTER — TELEMEDICINE (OUTPATIENT)
Dept: INTERNAL MEDICINE CLINIC | Facility: CLINIC | Age: 77
End: 2022-05-02
Payer: COMMERCIAL

## 2022-05-02 DIAGNOSIS — R05.8 COUGH DUE TO ACE INHIBITOR: Primary | ICD-10-CM

## 2022-05-02 DIAGNOSIS — T46.4X5A COUGH DUE TO ACE INHIBITOR: Primary | ICD-10-CM

## 2022-05-02 DIAGNOSIS — I10 ESSENTIAL HYPERTENSION WITH GOAL BLOOD PRESSURE LESS THAN 140/90: ICD-10-CM

## 2022-05-02 RX ORDER — AMLODIPINE BESYLATE 5 MG/1
5 TABLET ORAL DAILY
Qty: 90 TABLET | Refills: 0 | Status: SHIPPED | OUTPATIENT
Start: 2022-05-02 | End: 2022-07-31

## 2022-05-03 ENCOUNTER — TELEPHONE (OUTPATIENT)
Dept: INTERNAL MEDICINE CLINIC | Facility: CLINIC | Age: 77
End: 2022-05-03

## 2022-07-27 RX ORDER — AMLODIPINE BESYLATE 5 MG/1
TABLET ORAL
Qty: 90 TABLET | Refills: 0 | Status: SHIPPED | OUTPATIENT
Start: 2022-07-27

## 2022-08-30 ENCOUNTER — APPOINTMENT (OUTPATIENT)
Dept: URBAN - METROPOLITAN AREA CLINIC 244 | Age: 77
Setting detail: DERMATOLOGY
End: 2022-08-31

## 2022-08-30 DIAGNOSIS — L57.0 ACTINIC KERATOSIS: ICD-10-CM

## 2022-08-30 DIAGNOSIS — L20.89 OTHER ATOPIC DERMATITIS: ICD-10-CM

## 2022-08-30 PROBLEM — L20.84 INTRINSIC (ALLERGIC) ECZEMA: Status: ACTIVE | Noted: 2022-08-30

## 2022-08-30 PROCEDURE — 17003 DESTRUCT PREMALG LES 2-14: CPT

## 2022-08-30 PROCEDURE — OTHER PRESCRIPTION: OTHER

## 2022-08-30 PROCEDURE — OTHER COUNSELING: OTHER

## 2022-08-30 PROCEDURE — 17000 DESTRUCT PREMALG LESION: CPT

## 2022-08-30 PROCEDURE — OTHER LIQUID NITROGEN: OTHER

## 2022-08-30 PROCEDURE — 99213 OFFICE O/P EST LOW 20 MIN: CPT | Mod: 25

## 2022-08-30 RX ORDER — TRIAMCINOLONE ACETONIDE 1 MG/G
OINTMENT TOPICAL
Qty: 454 | Refills: 1 | Status: ERX | COMMUNITY
Start: 2022-08-30

## 2022-08-30 ASSESSMENT — LOCATION SIMPLE DESCRIPTION DERM: LOCATION SIMPLE: LEFT FOREHEAD

## 2022-08-30 ASSESSMENT — LOCATION DETAILED DESCRIPTION DERM: LOCATION DETAILED: LEFT MEDIAL FOREHEAD

## 2022-08-30 ASSESSMENT — LOCATION ZONE DERM: LOCATION ZONE: FACE

## 2022-08-30 NOTE — PROCEDURE: LIQUID NITROGEN
Post-Care Instructions: I reviewed with the patient in detail post-care instructions. Patient is to wear sunprotection, and avoid picking at any of the treated lesions. Pt may apply Vaseline to crusted or scabbing areas.
Render Post-Care Instructions In Note?: no
Consent: The patient's consent was obtained including but not limited to risks of crusting, scabbing, blistering, scarring, darker or lighter pigmentary change, recurrence, incomplete removal and infection.
Duration Of Freeze Thaw-Cycle (Seconds): 0
Detail Level: Zone
Total Number Of Aks Treated: 6
Number Of Freeze-Thaw Cycles: 1 freeze-thaw cycle

## 2022-09-07 ENCOUNTER — LAB ENCOUNTER (OUTPATIENT)
Dept: LAB | Age: 77
End: 2022-09-07
Attending: INTERNAL MEDICINE
Payer: MEDICARE

## 2022-09-07 ENCOUNTER — OFFICE VISIT (OUTPATIENT)
Dept: INTERNAL MEDICINE CLINIC | Facility: CLINIC | Age: 77
End: 2022-09-07
Payer: COMMERCIAL

## 2022-09-07 VITALS
SYSTOLIC BLOOD PRESSURE: 130 MMHG | DIASTOLIC BLOOD PRESSURE: 78 MMHG | HEART RATE: 55 BPM | BODY MASS INDEX: 25.48 KG/M2 | HEIGHT: 70 IN | WEIGHT: 178 LBS

## 2022-09-07 DIAGNOSIS — I10 PRIMARY HYPERTENSION: ICD-10-CM

## 2022-09-07 DIAGNOSIS — D50.9 MICROCYTIC ANEMIA: ICD-10-CM

## 2022-09-07 DIAGNOSIS — E55.9 VITAMIN D DEFICIENCY: ICD-10-CM

## 2022-09-07 DIAGNOSIS — Z00.00 ENCOUNTER FOR ANNUAL HEALTH EXAMINATION: Primary | ICD-10-CM

## 2022-09-07 DIAGNOSIS — C44.519 BASAL CELL CARCINOMA OF CHEST: ICD-10-CM

## 2022-09-07 DIAGNOSIS — Z12.11 COLON CANCER SCREENING: ICD-10-CM

## 2022-09-07 DIAGNOSIS — Z00.00 ENCOUNTER FOR ANNUAL HEALTH EXAMINATION: ICD-10-CM

## 2022-09-07 DIAGNOSIS — K29.50 CHRONIC GASTRITIS WITHOUT BLEEDING, UNSPECIFIED GASTRITIS TYPE: ICD-10-CM

## 2022-09-07 LAB
ALBUMIN SERPL-MCNC: 3.6 G/DL (ref 3.4–5)
ALBUMIN/GLOB SERPL: 1.1 {RATIO} (ref 1–2)
ALP LIVER SERPL-CCNC: 138 U/L
ALT SERPL-CCNC: 34 U/L
ANION GAP SERPL CALC-SCNC: 6 MMOL/L (ref 0–18)
AST SERPL-CCNC: 24 U/L (ref 15–37)
BASOPHILS # BLD AUTO: 0.08 X10(3) UL (ref 0–0.2)
BASOPHILS NFR BLD AUTO: 0.7 %
BILIRUB SERPL-MCNC: 0.8 MG/DL (ref 0.1–2)
BUN BLD-MCNC: 13 MG/DL (ref 7–18)
BUN/CREAT SERPL: 14.1 (ref 10–20)
CALCIUM BLD-MCNC: 8.5 MG/DL (ref 8.5–10.1)
CHLORIDE SERPL-SCNC: 107 MMOL/L (ref 98–112)
CHOLEST SERPL-MCNC: 166 MG/DL (ref ?–200)
CO2 SERPL-SCNC: 28 MMOL/L (ref 21–32)
CREAT BLD-MCNC: 0.92 MG/DL
CREAT UR-SCNC: 174 MG/DL
DEPRECATED RDW RBC AUTO: 45.8 FL (ref 35.1–46.3)
EOSINOPHIL # BLD AUTO: 2.31 X10(3) UL (ref 0–0.7)
EOSINOPHIL NFR BLD AUTO: 20.9 %
ERYTHROCYTE [DISTWIDTH] IN BLOOD BY AUTOMATED COUNT: 14.5 % (ref 11–15)
FASTING PATIENT LIPID ANSWER: YES
FASTING STATUS PATIENT QL REPORTED: YES
GFR SERPLBLD BASED ON 1.73 SQ M-ARVRAT: 87 ML/MIN/1.73M2 (ref 60–?)
GLOBULIN PLAS-MCNC: 3.4 G/DL (ref 2.8–4.4)
GLUCOSE BLD-MCNC: 83 MG/DL (ref 70–99)
HCT VFR BLD AUTO: 44.2 %
HDLC SERPL-MCNC: 42 MG/DL (ref 40–59)
HGB BLD-MCNC: 14.2 G/DL
IMM GRANULOCYTES # BLD AUTO: 0.03 X10(3) UL (ref 0–1)
IMM GRANULOCYTES NFR BLD: 0.3 %
LDLC SERPL CALC-MCNC: 99 MG/DL (ref ?–100)
LYMPHOCYTES # BLD AUTO: 1.8 X10(3) UL (ref 1–4)
LYMPHOCYTES NFR BLD AUTO: 16.3 %
MCH RBC QN AUTO: 28 PG (ref 26–34)
MCHC RBC AUTO-ENTMCNC: 32.1 G/DL (ref 31–37)
MCV RBC AUTO: 87.2 FL
MICROALBUMIN UR-MCNC: 1.38 MG/DL
MICROALBUMIN/CREAT 24H UR-RTO: 7.9 UG/MG (ref ?–30)
MONOCYTES # BLD AUTO: 0.86 X10(3) UL (ref 0.1–1)
MONOCYTES NFR BLD AUTO: 7.8 %
NEUTROPHILS # BLD AUTO: 5.99 X10 (3) UL (ref 1.5–7.7)
NEUTROPHILS # BLD AUTO: 5.99 X10(3) UL (ref 1.5–7.7)
NEUTROPHILS NFR BLD AUTO: 54 %
NONHDLC SERPL-MCNC: 124 MG/DL (ref ?–130)
OSMOLALITY SERPL CALC.SUM OF ELEC: 291 MOSM/KG (ref 275–295)
PLATELET # BLD AUTO: 366 10(3)UL (ref 150–450)
POTASSIUM SERPL-SCNC: 3.5 MMOL/L (ref 3.5–5.1)
PROT SERPL-MCNC: 7 G/DL (ref 6.4–8.2)
PSA SERPL-MCNC: 1.18 NG/ML (ref ?–4)
RBC # BLD AUTO: 5.07 X10(6)UL
SODIUM SERPL-SCNC: 141 MMOL/L (ref 136–145)
TRIGL SERPL-MCNC: 139 MG/DL (ref 30–149)
TSI SER-ACNC: 3.22 MIU/ML (ref 0.36–3.74)
VIT D+METAB SERPL-MCNC: 21.1 NG/ML (ref 30–100)
VLDLC SERPL CALC-MCNC: 23 MG/DL (ref 0–30)
WBC # BLD AUTO: 11.1 X10(3) UL (ref 4–11)

## 2022-09-07 PROCEDURE — 3078F DIAST BP <80 MM HG: CPT | Performed by: INTERNAL MEDICINE

## 2022-09-07 PROCEDURE — 82043 UR ALBUMIN QUANTITATIVE: CPT

## 2022-09-07 PROCEDURE — 99397 PER PM REEVAL EST PAT 65+ YR: CPT | Performed by: INTERNAL MEDICINE

## 2022-09-07 PROCEDURE — 80061 LIPID PANEL: CPT

## 2022-09-07 PROCEDURE — 80053 COMPREHEN METABOLIC PANEL: CPT

## 2022-09-07 PROCEDURE — 3075F SYST BP GE 130 - 139MM HG: CPT | Performed by: INTERNAL MEDICINE

## 2022-09-07 PROCEDURE — 82306 VITAMIN D 25 HYDROXY: CPT

## 2022-09-07 PROCEDURE — 85060 BLOOD SMEAR INTERPRETATION: CPT

## 2022-09-07 PROCEDURE — 96160 PT-FOCUSED HLTH RISK ASSMT: CPT | Performed by: INTERNAL MEDICINE

## 2022-09-07 PROCEDURE — 3008F BODY MASS INDEX DOCD: CPT | Performed by: INTERNAL MEDICINE

## 2022-09-07 PROCEDURE — 84153 ASSAY OF PSA TOTAL: CPT

## 2022-09-07 PROCEDURE — 84443 ASSAY THYROID STIM HORMONE: CPT

## 2022-09-07 PROCEDURE — 82570 ASSAY OF URINE CREATININE: CPT

## 2022-09-07 PROCEDURE — G0439 PPPS, SUBSEQ VISIT: HCPCS | Performed by: INTERNAL MEDICINE

## 2022-09-07 PROCEDURE — 85025 COMPLETE CBC W/AUTO DIFF WBC: CPT

## 2022-09-07 PROCEDURE — 1126F AMNT PAIN NOTED NONE PRSNT: CPT | Performed by: INTERNAL MEDICINE

## 2022-09-07 PROCEDURE — 36415 COLL VENOUS BLD VENIPUNCTURE: CPT

## 2022-09-13 ENCOUNTER — TELEPHONE (OUTPATIENT)
Dept: INTERNAL MEDICINE CLINIC | Facility: CLINIC | Age: 77
End: 2022-09-13

## 2022-10-29 RX ORDER — AMLODIPINE BESYLATE 5 MG/1
5 TABLET ORAL DAILY
Qty: 90 TABLET | Refills: 1 | Status: SHIPPED | OUTPATIENT
Start: 2022-10-29

## 2022-10-30 NOTE — TELEPHONE ENCOUNTER
Refill passed per [facility] protocol   Requested Prescriptions   Pending Prescriptions Disp Refills    AMLODIPINE 5 MG Oral Tab [Pharmacy Med Name: AMLODIPINE BESYLATE 5MG TABLETS] 90 tablet 0     Sig: TAKE 1 TABLET(5 MG) BY MOUTH DAILY       Hypertensive Medications Protocol Passed - 10/29/2022 10:06 AM        Passed - In person appointment in the past 12 or next 3 months     Recent Outpatient Visits              1 month ago Encounter for annual health examination    Shivani Nance [place], MD    Office Visit    6 months ago Cough due to ACE inhibitor    [address], [facility], [facility]    Telemedicine    1 year ago Hiatal hernia with GERD    [address] [facility] [place], [facility]    Office Visit    1 year ago Encounter for annual health examination    Dimple Romero MD    Office Visit    1 year ago Pain of left heel    [address], [address] [facility] [place] [facility] [place]    Office Visit                      Passed - Last BP reading less than 140/90     BP Readings from Last 1 Encounters:  09/07/22 : 130/78              Passed - CMP or BMP in past 6 months     Recent Results (from the past 4392 hour(s))   COMP METABOLIC PANEL (14)    Collection Time: 09/07/22  8:34 AM   Result Value Ref Range    Glucose 83 70 - 99 mg/dL    Sodium 141 136 - 145 mmol/L    Potassium 3.5 3.5 - 5.1 mmol/L    Chloride 107 98 - 112 mmol/L    CO2 28.0 21.0 - 32.0 mmol/L    Anion Gap 6 0 - 18 mmol/L    BUN 13 7 - 18 mg/dL    Creatinine 0.92 0.70 - 1.30 mg/dL    BUN/CREA Ratio 14.1 10.0 - 20.0    Calcium, Total 8.5 8.5 - 10.1 mg/dL    Calculated Osmolality 291 275 - 295 mOsm/kg    eGFR-Cr 87 >=60 mL/min/1.73m2    ALT 34 16 - 61 U/L    AST 24 15 - 37 U/L    Alkaline Phosphatase 138 (H) 45 - 117 U/L    Bilirubin, Total 0.8 0.1 - 2.0 mg/dL    Total Protein 7.0 6.4 - 8.2 g/dL    Albumin 3.6 3.4 - 5.0 g/dL    Globulin  3.4 2.8 - 4.4 g/dL    A/G Ratio 1.1 1.0 - 2.0    Patient Fasting for CMP? Yes      *Note: Due to a large number of results and/or encounters for the requested time period, some results have not been displayed. A complete set of results can be found in Results Review.                Passed - In person appointment or virtual visit in the past 6 months     Recent Outpatient Visits              1 month ago [facility] for annual health examination    [facility], [address] [place], MD    Office Visit    6 months ago Cough due to ACE inhibitor    [facility], [address] APRN    Telemedicine    1 year ago Hiatal hernia with GERD    [facility], [address] [facility] [place]    Office Visit    1 year ago Encounter for annual health examination    Omar Bentley MD    Office Visit    1 year ago Pain of left heel    Oval Colder, Quintin Hernandez MD    Office Visit                      Passed - [facility] or GFRNAA > 50     GFR Evaluation  EGFRCR: 87 , resulted on 9/7/2022

## 2022-12-20 ENCOUNTER — TELEPHONE (OUTPATIENT)
Facility: CLINIC | Age: 77
End: 2022-12-20

## 2022-12-20 RX ORDER — PANTOPRAZOLE SODIUM 40 MG/1
40 TABLET, DELAYED RELEASE ORAL
Qty: 90 TABLET | Refills: 1 | Status: SHIPPED | OUTPATIENT
Start: 2022-12-20

## 2022-12-21 NOTE — TELEPHONE ENCOUNTER
GI RNs: I have refilled the prescription for 6 months. Please contact the patient. He should be seen in the office in follow-up for further refills. Alternatively the prescription could be filled by his PCP. Patient preference.

## 2022-12-21 NOTE — TELEPHONE ENCOUNTER
I spoke to [place]. I notified Pantoprazole presciption is refilled and Dr. Winston Rueda wants to see him in his office. Offered to schedule appt since MD is booked until April, patient will call  to schedule. I informed the patient, he may have his PCP refill pantoprazole.

## 2023-03-03 NOTE — TELEPHONE ENCOUNTER
Refill passed per [facility] protocol   Requested Prescriptions   Pending Prescriptions Disp Refills    AMLODIPINE 5 MG Oral Tab [Pharmacy Med Name: AMLODIPINE BESYLATE 5MG TABLETS] 90 tablet 0     Sig: TAKE 1 TABLET(5 MG) BY MOUTH DAILY       Hypertensive Medications Protocol Passed - 10/29/2022 10:06 AM        Passed - In person appointment in the past 12 or next 3 months     Recent Outpatient Visits              1 month ago Encounter for annual health examination    Shivani Nance [place], MD    Office Visit    6 months ago Cough due to ACE inhibitor    [facility], [address] [place], [place]    Telemedicine    1 year ago Hiatal hernia with GERD    [facility], [address] [facility] [place]    Office Visit    1 year ago Encounter for annual health examination    Raul Sheppard MD    Office Visit    1 year ago Pain of left heel    [facility], [address] Reymundo Inch, MD    Office Visit                      Passed - Last BP reading less than 140/90     BP Readings from Last 1 Encounters:  09/07/22 : 130/78              Passed - CMP or BMP in past 6 months     Recent Results (from the past 4392 hour(s))   COMP METABOLIC PANEL (14)    Collection Time: 09/07/22  8:34 AM   Result Value Ref Range    Glucose 83 70 - 99 mg/dL    Sodium 141 136 - 145 mmol/L    Potassium 3.5 3.5 - 5.1 mmol/L    Chloride 107 98 - 112 mmol/L    CO2 28.0 21.0 - 32.0 mmol/L    Anion Gap 6 0 - 18 mmol/L    BUN 13 7 - 18 mg/dL    Creatinine 0.92 0.70 - 1.30 mg/dL    BUN/CREA Ratio 14.1 10.0 - 20.0    Calcium, Total 8.5 8.5 - 10.1 mg/dL    Calculated Osmolality 291 275 - 295 mOsm/kg    eGFR-Cr 87 >=60 mL/min/1.73m2    ALT 34 16 - 61 U/L    AST 24 15 - 37 U/L    Alkaline Phosphatase 138 (H) 45 - 117 U/L    Bilirubin, Total 0.8 0.1 - 2.0 mg/dL    Total Protein 7.0 6.4 - 8.2 g/dL    Albumin 3.6 3.4 - 5.0 g/dL    Globulin  3.4 2.8 - 4.4 g/dL    A/G Ratio 1.1 1.0 - 2.0    Patient Fasting for CMP? Yes      *Note: Due to a large number of results and/or encounters for the requested time period, some results have not been displayed. A complete set of results can be found in Results Review.                Passed - In person appointment or virtual visit in the past 6 months     Recent Outpatient Visits              1 month ago Encounter for annual health examination    [facility], [address] [place], MD    Office Visit    6 months ago Cough due to ACE inhibitor    [facility], [address] APRN    Telemedicine    1 year ago Hiatal hernia with GERD    [facility], [address] [facility] [place]    Office Visit    1 year ago Encounter for annual health examination    Mustapha Ortiz MD    Office Visit    1 year ago Pain of left heel    Raford Nima, Solomon Hernandez MD    Office Visit                      Passed - [facility] or GFRNAA > 50     GFR Evaluation  EGFRCR: 87 , resulted on 9/7/2022 No

## 2023-04-28 RX ORDER — AMLODIPINE BESYLATE 5 MG/1
TABLET ORAL
Qty: 90 TABLET | Refills: 1 | Status: SHIPPED | OUTPATIENT
Start: 2023-04-28

## 2023-05-08 ENCOUNTER — TELEPHONE (OUTPATIENT)
Facility: CLINIC | Age: 78
End: 2023-05-08

## 2023-05-08 NOTE — TELEPHONE ENCOUNTER
----- Message from Jerilyn Randhawa, [address] sent at 7/10/2018  8:39 AM CDT -----  Regarding: Recall colon   Recall colon in 5 years per GS.  Colon done 7-3-18

## 2023-06-17 ENCOUNTER — APPOINTMENT (OUTPATIENT)
Dept: CT IMAGING | Facility: HOSPITAL | Age: 78
End: 2023-06-17
Attending: EMERGENCY MEDICINE
Payer: MEDICARE

## 2023-06-17 ENCOUNTER — HOSPITAL ENCOUNTER (EMERGENCY)
Facility: HOSPITAL | Age: 78
Discharge: HOME | End: 2023-06-17
Attending: EMERGENCY MEDICINE
Payer: MEDICARE

## 2023-06-17 VITALS
OXYGEN SATURATION: 94 % | WEIGHT: 185 LBS | SYSTOLIC BLOOD PRESSURE: 174 MMHG | DIASTOLIC BLOOD PRESSURE: 72 MMHG | BODY MASS INDEX: 27 KG/M2 | HEART RATE: 57 BPM | RESPIRATION RATE: 20 BRPM | TEMPERATURE: 97 F

## 2023-06-17 DIAGNOSIS — K57.92 ACUTE DIVERTICULITIS: Primary | ICD-10-CM

## 2023-06-17 LAB
ALBUMIN SERPL-MCNC: 4.2 G/DL (ref 3.4–5)
ALP LIVER SERPL-CCNC: 157 U/L
ALT SERPL-CCNC: 25 U/L
ANION GAP SERPL CALC-SCNC: 11 MMOL/L (ref 0–18)
AST SERPL-CCNC: 22 U/L (ref 15–37)
BASOPHILS # BLD AUTO: 0.06 X10(3) UL (ref 0–0.2)
BASOPHILS NFR BLD AUTO: 0.3 %
BILIRUB DIRECT SERPL-MCNC: 0.2 MG/DL (ref 0–0.2)
BILIRUB SERPL-MCNC: 1 MG/DL (ref 0.1–2)
BILIRUB UR QL: NEGATIVE
BUN BLD-MCNC: 14 MG/DL (ref 7–18)
BUN/CREAT SERPL: 15.9 (ref 10–20)
CALCIUM BLD-MCNC: 9.3 MG/DL (ref 8.5–10.1)
CHLORIDE SERPL-SCNC: 103 MMOL/L (ref 98–112)
CLARITY UR: CLEAR
CO2 SERPL-SCNC: 25 MMOL/L (ref 21–32)
CREAT BLD-MCNC: 0.88 MG/DL
DEPRECATED RDW RBC AUTO: 43.8 FL (ref 35.1–46.3)
EOSINOPHIL # BLD AUTO: 0.35 X10(3) UL (ref 0–0.7)
EOSINOPHIL NFR BLD AUTO: 2 %
ERYTHROCYTE [DISTWIDTH] IN BLOOD BY AUTOMATED COUNT: 14.5 % (ref 11–15)
GFR SERPLBLD BASED ON 1.73 SQ M-ARVRAT: 90 ML/MIN/1.73M2 (ref 60–?)
GLUCOSE BLD-MCNC: 149 MG/DL (ref 70–99)
GLUCOSE UR-MCNC: NORMAL MG/DL
HCT VFR BLD AUTO: 48.2 %
HGB BLD-MCNC: 16.3 G/DL
HYALINE CASTS #/AREA URNS AUTO: PRESENT /LPF
IMM GRANULOCYTES # BLD AUTO: 0.1 X10(3) UL (ref 0–1)
IMM GRANULOCYTES NFR BLD: 0.6 %
KETONES UR-MCNC: 10 MG/DL
LEUKOCYTE ESTERASE UR QL STRIP.AUTO: NEGATIVE
LIPASE SERPL-CCNC: 34 U/L (ref 13–75)
LYMPHOCYTES # BLD AUTO: 1.59 X10(3) UL (ref 1–4)
LYMPHOCYTES NFR BLD AUTO: 9.2 %
MCH RBC QN AUTO: 28.3 PG (ref 26–34)
MCHC RBC AUTO-ENTMCNC: 33.8 G/DL (ref 31–37)
MCV RBC AUTO: 83.7 FL
MONOCYTES # BLD AUTO: 0.63 X10(3) UL (ref 0.1–1)
MONOCYTES NFR BLD AUTO: 3.7 %
NEUTROPHILS # BLD AUTO: 14.49 X10 (3) UL (ref 1.5–7.7)
NEUTROPHILS # BLD AUTO: 14.49 X10(3) UL (ref 1.5–7.7)
NEUTROPHILS NFR BLD AUTO: 84.2 %
NITRITE UR QL STRIP.AUTO: NEGATIVE
OSMOLALITY SERPL CALC.SUM OF ELEC: 291 MOSM/KG (ref 275–295)
PH UR: 7.5 [PH] (ref 5–8)
PLATELET # BLD AUTO: 362 10(3)UL (ref 150–450)
POTASSIUM SERPL-SCNC: 3.5 MMOL/L (ref 3.5–5.1)
PROT SERPL-MCNC: 8.2 G/DL (ref 6.4–8.2)
PROT UR-MCNC: 20 MG/DL
RBC # BLD AUTO: 5.76 X10(6)UL
RBC #/AREA URNS AUTO: >10 /HPF
SODIUM SERPL-SCNC: 139 MMOL/L (ref 136–145)
SP GR UR STRIP: >1.03 (ref 1–1.03)
UROBILINOGEN UR STRIP-ACNC: NORMAL
WBC # BLD AUTO: 17.2 X10(3) UL (ref 4–11)

## 2023-06-17 PROCEDURE — 83690 ASSAY OF LIPASE: CPT | Performed by: EMERGENCY MEDICINE

## 2023-06-17 PROCEDURE — S0028 INJECTION, FAMOTIDINE, 20 MG: HCPCS | Performed by: EMERGENCY MEDICINE

## 2023-06-17 PROCEDURE — 81001 URINALYSIS AUTO W/SCOPE: CPT | Performed by: EMERGENCY MEDICINE

## 2023-06-17 PROCEDURE — 96376 TX/PRO/DX INJ SAME DRUG ADON: CPT

## 2023-06-17 PROCEDURE — 96375 TX/PRO/DX INJ NEW DRUG ADDON: CPT

## 2023-06-17 PROCEDURE — 74177 CT ABD & PELVIS W/CONTRAST: CPT | Performed by: EMERGENCY MEDICINE

## 2023-06-17 PROCEDURE — 85025 COMPLETE CBC W/AUTO DIFF WBC: CPT | Performed by: EMERGENCY MEDICINE

## 2023-06-17 PROCEDURE — 99284 EMERGENCY DEPT VISIT MOD MDM: CPT

## 2023-06-17 PROCEDURE — 80076 HEPATIC FUNCTION PANEL: CPT | Performed by: EMERGENCY MEDICINE

## 2023-06-17 PROCEDURE — 96365 THER/PROPH/DIAG IV INF INIT: CPT

## 2023-06-17 PROCEDURE — 80048 BASIC METABOLIC PNL TOTAL CA: CPT | Performed by: EMERGENCY MEDICINE

## 2023-06-17 PROCEDURE — 96361 HYDRATE IV INFUSION ADD-ON: CPT

## 2023-06-17 RX ORDER — SACCHAROMYCES BOULARDII 250 MG
250 CAPSULE ORAL 2 TIMES DAILY
Qty: 28 CAPSULE | Refills: 0 | Status: SHIPPED | OUTPATIENT
Start: 2023-06-17 | End: 2023-07-01

## 2023-06-17 RX ORDER — AMOXICILLIN AND CLAVULANATE POTASSIUM 875; 125 MG/1; MG/1
1 TABLET, FILM COATED ORAL 2 TIMES DAILY
Qty: 14 TABLET | Refills: 0 | Status: SHIPPED | OUTPATIENT
Start: 2023-06-17 | End: 2023-06-24

## 2023-06-17 RX ORDER — MORPHINE SULFATE 2 MG/ML
2 INJECTION, SOLUTION INTRAMUSCULAR; INTRAVENOUS ONCE
Status: COMPLETED | OUTPATIENT
Start: 2023-06-17 | End: 2023-06-17

## 2023-06-17 RX ORDER — ACETAMINOPHEN AND CODEINE PHOSPHATE 300; 30 MG/1; MG/1
1 TABLET ORAL EVERY 6 HOURS PRN
Qty: 15 TABLET | Refills: 0 | Status: SHIPPED | OUTPATIENT
Start: 2023-06-17 | End: 2023-06-22

## 2023-06-17 RX ORDER — ONDANSETRON 2 MG/ML
4 INJECTION INTRAMUSCULAR; INTRAVENOUS ONCE
Status: COMPLETED | OUTPATIENT
Start: 2023-06-17 | End: 2023-06-17

## 2023-06-17 RX ORDER — FAMOTIDINE 10 MG/ML
20 INJECTION, SOLUTION INTRAVENOUS ONCE
Status: COMPLETED | OUTPATIENT
Start: 2023-06-17 | End: 2023-06-17

## 2023-06-17 NOTE — ED QUICK NOTES
Received report from [employer]. Pt is on cart at this time. Will continue to monitor.

## 2023-06-17 NOTE — ED INITIAL ASSESSMENT (HPI)
Patient complains of generalized abdominal pain since last night. Denies n/v/d or urinary complaints.

## 2023-07-05 RX ORDER — PANTOPRAZOLE SODIUM 40 MG/1
40 TABLET, DELAYED RELEASE ORAL
Qty: 90 TABLET | Refills: 0 | Status: SHIPPED | OUTPATIENT
Start: 2023-07-05

## 2023-07-05 NOTE — TELEPHONE ENCOUNTER
Please contact the patient. I have refilled the pantoprazole. The patient was recently diagnosed with diverticulitis. He is overdue for a colonoscopy which should be scheduled in August/September 2023. If he is still having symptoms related to the diverticulitis he should let us know.

## 2023-07-05 NOTE — TELEPHONE ENCOUNTER
Requested Prescriptions     Pending Prescriptions Disp Refills    PANTOPRAZOLE 40 MG Oral Tab EC [Pharmacy Med Name: PANTOPRAZOLE 40MG TABLETS] 90 tablet 1     Sig: TAKE 1 TABLET(40 MG) BY MOUTH EVERY MORNING BEFORE BREAKFAST     Lov: 10/13/21  Last refill: 12/20/22

## 2023-07-06 NOTE — TELEPHONE ENCOUNTER
Dr Soha Tejada- I spoke to patient, he mentioned he had a flare up 6/17 and was at the ER. I set up office visit for patient for 7/24 (patients request). I also mentioned he is due Colonoscopy, but he said he does not think he wants a procedure done, will discuss at [address]

## 2023-07-24 ENCOUNTER — OFFICE VISIT (OUTPATIENT)
Facility: CLINIC | Age: 78
End: 2023-07-24

## 2023-07-24 VITALS
WEIGHT: 173 LBS | SYSTOLIC BLOOD PRESSURE: 132 MMHG | HEIGHT: 70 IN | BODY MASS INDEX: 24.77 KG/M2 | HEART RATE: 50 BPM | DIASTOLIC BLOOD PRESSURE: 64 MMHG

## 2023-07-24 DIAGNOSIS — R93.3 ABNORMAL CT SCAN, COLON: ICD-10-CM

## 2023-07-24 DIAGNOSIS — K21.00 GASTROESOPHAGEAL REFLUX DISEASE WITH ESOPHAGITIS WITHOUT HEMORRHAGE: ICD-10-CM

## 2023-07-24 DIAGNOSIS — K57.32 DIVERTICULITIS LARGE INTESTINE W/O PERFORATION OR ABSCESS W/O BLEEDING: Primary | ICD-10-CM

## 2023-07-24 PROCEDURE — 1126F AMNT PAIN NOTED NONE PRSNT: CPT | Performed by: INTERNAL MEDICINE

## 2023-07-24 PROCEDURE — 3008F BODY MASS INDEX DOCD: CPT | Performed by: INTERNAL MEDICINE

## 2023-07-24 PROCEDURE — 99203 OFFICE O/P NEW LOW 30 MIN: CPT | Performed by: INTERNAL MEDICINE

## 2023-07-24 PROCEDURE — 1159F MED LIST DOCD IN RCRD: CPT | Performed by: INTERNAL MEDICINE

## 2023-07-24 PROCEDURE — 3075F SYST BP GE 130 - 139MM HG: CPT | Performed by: INTERNAL MEDICINE

## 2023-07-24 PROCEDURE — 3078F DIAST BP <80 MM HG: CPT | Performed by: INTERNAL MEDICINE

## 2023-07-24 RX ORDER — SODIUM, POTASSIUM,MAG SULFATES 17.5-3.13G
SOLUTION, RECONSTITUTED, ORAL ORAL
Qty: 1 EACH | Refills: 0 | Status: SHIPPED | OUTPATIENT
Start: 2023-07-24

## 2023-07-24 RX ORDER — PANTOPRAZOLE SODIUM 40 MG/1
40 TABLET, DELAYED RELEASE ORAL
Qty: 90 TABLET | Refills: 3 | Status: SHIPPED | OUTPATIENT
Start: 2023-07-24

## 2023-07-24 NOTE — PATIENT INSTRUCTIONS
1.  May substitute Prilosec (omeprazole) or Nexium (esomeprazole) 20 or 40 mg daily for the pantoprazole. 2.  Please contact us to schedule a colonoscopy for a history of colon polyps and diverticulitis. Should be done in the next few months.

## 2023-07-29 NOTE — PROGRESS NOTES
Subjective:   Patient ID: Mady Avila. is a 78year old male. HPI  The patient returns in follow-up. He was last seen by myself in January 2020 and by TERRI Veliz in October 2021. As per previous notes the patient underwent upper and lower endoscopy in July 2018 for a personal history of adenomatous colon polyps and episodes of gastroesophageal reflux with possible esophageal spasm/possible dysphagia. The patient was found to have #2 subcentimeter tubular adenomas, colonic lipomata, extensive pancolonic diverticulosis, LA grade D esophagitis, and a hiatal hernia (5 cm) with a possible slight esophageal stricture. The stricture was dilated and the patient was placed on pantoprazole. Follow-up endoscopy in October 2018 revealed that the esophagitis had healed. A 3 cm hiatal hernia was present with associated residual gastritis. A surveillance colonoscopy was advised in July 2023. In February 2019 the patient presented to the emergency room with abdominal pain. A CT scan revealed \"1. Severe diverticulosis involving the entire colon. 2.  Acute diverticulitis of the splenic flexure. 3.  Mild or acute diverticulitis at the junction of the descending colon and sigmoid colon as well as in the region of the hepatic flexure. 4.  No evidence of abscess or free air\". The patient was placed on amoxicillin/clavulanic acid and his symptoms promptly resolved. He equates treatment with the antibiotic and treatment with the pantoprazole as amongst the best things that have been prescribed. The patient has a longstanding history of a low normal hemoglobin. In April 2018 the patient was found to have an iron deficiency anemia with a hemoglobin of 13.2 that decreased to a rocco of 9.9 in May 2019. [place] iron saturation and ferritin were 4% and 5.5 respectively in June 2019. The patient was prescribed daily iron supplementation which he was compliant with for only a few weeks.   He has since taken an iron tablet sporadically. At the time of the patient's last visit with [facility] he was asymptomatic. Current history:  The patient presented to the ED on 6/17/2023 with lower abdominal pain. He attributes the symptoms to taking #2 Advil tablets for a swollen foot on an empty stomach. Evaluation revealed a white cell count of 17.2. A CT scan revealed \"proximal and distal diverticulitis\" without complication. The patient was given a dose of intravenous Unasyn and discharged on a 7-day course of Augmentin. The bowel movements as mentioned are regular without bleeding. Patient relates that his abdominal pain resolved quickly. He had no associated changes in bowel movements and no fever. The patient's appetite is good. He has no dysphagia or heartburn on the pantoprazole. He denies current abdominal pain. Current subjective wellbeing is good. History/Other:   Review of Systems  See above    Wt Readings from Last 7 Encounters:  07/24/23 : 173 lb (78.5 kg)  06/17/23 : 185 lb (83.9 kg)  09/07/22 : 178 lb (80.7 kg)  10/13/21 : 181 lb (82.1 kg)  08/31/21 : 180 lb (81.6 kg)  03/30/21 : 183 lb (83 kg)  12/28/20 : 184 lb (83.5 kg)      Current Outpatient Medications   Medication Sig Dispense Refill    pantoprazole 40 MG Oral Tab EC Take 1 tablet (40 mg total) by mouth before breakfast. 90 tablet 3    Na Sulfate-K Sulfate-Mg Sulf (SUPREP BOWEL PREP KIT) 17.5-3.13-1.6 GM/177ML Oral Solution Take as directed 1 each 0    AMLODIPINE 5 MG Oral Tab TAKE 1 TABLET(5 MG) BY MOUTH IN THE MORNING 90 tablet 1     Allergies:No Known Allergies    Objective:   Physical Exam  Vitals and nursing note reviewed. Constitutional:       General: He is not in acute distress. Appearance: He is well-developed. He is not ill-appearing or diaphoretic. HENT:      Mouth/Throat:      Pharynx: No oropharyngeal exudate. Eyes:      General: No scleral icterus.      Conjunctiva/sclera: Conjunctivae normal. Neck:      Thyroid: No thyromegaly. Cardiovascular:      Rate and Rhythm: Normal rate and regular rhythm. Heart sounds: Normal heart sounds. No murmur heard. Pulmonary:      Effort: Pulmonary effort is normal. No respiratory distress. Breath sounds: Normal breath sounds. No wheezing or rales. Abdominal:      General: Bowel sounds are normal. There is no distension. Palpations: Abdomen is soft. There is no mass. Tenderness: There is no abdominal tenderness. There is no guarding or rebound. Musculoskeletal:      Cervical back: Neck supple. Lymphadenopathy:      Cervical: No cervical adenopathy. Neurological:      Mental Status: He is alert and oriented to person, place, and time.    Psychiatric:         Behavior: Behavior normal.         Component      Latest Ref [facility] 6/17/2023   WBC      4.0 - 11.0 x10(3) uL 17.2 (H)    RBC      3.80 - 5.80 x10(6)uL 5.76    Hemoglobin      13.0 - 17.5 g/dL 16.3    Hematocrit      39.0 - 53.0 % 48.2    MCV      80.0 - 100.0 fL 83.7    MCH      26.0 - 34.0 pg 28.3    MCHC      31.0 - 37.0 g/dL 33.8    RDW-SD      35.1 - 46.3 fL 43.8    RDW      11.0 - 15.0 % 14.5    Platelet Count      366.3 - 450.0 10(3)uL 362.0    Prelim Neutrophil Abs      1.50 - 7.70 x10 (3) uL 14.49 (H)    Neutrophils Absolute      1.50 - 7.70 x10(3) uL 14.49 (H)    Lymphocytes Absolute      1.00 - 4.00 x10(3) uL 1.59    Monocytes Absolute      0.10 - 1.00 x10(3) uL 0.63    Eosinophils Absolute      0.00 - 0.70 x10(3) uL 0.35    Basophils Absolute      0.00 - 0.20 x10(3) uL 0.06    Immature Granulocyte Absolute      0.00 - 1.00 x10(3) uL 0.10    Neutrophils %      % 84.2    Lymphocytes %      % 9.2    Monocytes %      % 3.7    Eosinophils %      % 2.0    Basophils %      % 0.3    Immature Granulocyte %      % 0.6    Glucose      70 - 99 mg/dL 149 (H)    Sodium      136 - 145 mmol/L 139    Potassium      3.5 - 5.1 mmol/L 3.5    Chloride      98 - 112 mmol/L 103    Carbon Dioxide, Total      21.0 - 32.0 mmol/L 25.0    ANION GAP      0 - 18 mmol/L 11    BUN      7 - 18 mg/dL 14    CREATININE      0.70 - 1.30 mg/dL 0.88    BUN/CREATININE RATIO      10.0 - 20.0  15.9    CALCIUM      8.5 - 10.1 mg/dL 9.3    CALCULATED OSMOLALITY      275 - 295 mOsm/kg 291    eGFR-Cr      >=60 mL/min/1.73m2 90    AST (SGOT)      15 - 37 U/L 22    ALT (SGPT)      16 - 61 U/L 25    ALKALINE PHOSPHATASE      45 - 117 U/L 157 (H)    Total Bilirubin      0.1 - 2.0 mg/dL 1.0    Bilirubin, Direct      0.0 - 0.2 mg/dL 0.2    PROTEIN, TOTAL      6.4 - 8.2 g/dL 8.2    Albumin      3.4 - 5.0 g/dL 4.2    Lipase      13 - 75 U/L 34       Legend:  (H) High    PROCEDURE: CT ABDOMEN PELVIS IV CONTRAST NO ORAL (ER)     COMPARISON: CT PF UROGRAM, 2/26/2014, 11:12 AM.  [facility], CT ABDOMEN PELVIS IV CONTRAST NO ORAL (ER), 2/05/2019, 3:57 PM.     INDICATIONS: Acute-onset generalized abdominal pain. TECHNIQUE: Multidetector CT images of the abdomen and pelvis were obtained with non-ionic intravenous contrast material. Automated exposure control for dose reduction was used. Adjustment of the mA and/or kV was done based on the patient's size. Iterative reconstruction technique for dose reduction was employed. Dose information was transmitted to the [facility]) [address] [facility] [address] which includes the Dose Index Registry. Oral contrast was ingested. FINDINGS:  LUNG BASES: The heart is normal in size. There is granulomatous calcification in the posterior left lower lobe. There is dependent subsegmental atelectasis bilaterally. LIVER: Nonspecific low density of the hepatic parenchyma may represent underlying hepatic steatosis. BILIARY: The gallbladder is present. PANCREAS: No lesion, fluid collection, ductal dilatation, or atrophy. SPLEEN: No enlargement. A few hypoattenuating foci are seen, with possible enhancing lesions.  Granulomatous calcifications are also present. ADRENALS:   No defined mass or abnormal enlargement. KIDNEYS:   Symmetric enhancement is seen without evidence of hydronephrosis or underlying solid masses. Multiple well circumscribed renal hypodensities are present and are not completely characterized, but statistically likely represent peripelvic cysts. GI/MESENTERY: A moderate-sized hiatal hernia is evident. Distal esophageal wall thickening is demonstrated. There is no evidence of bowel obstruction. The appendix is not seen, consistent with the provided history of appendectomy. Scattered colonic  diverticula are present throughout the entire colon. There is nonspecific minimal colonic wall thickening and trace pericolonic fat stranding proximally and distally. URINARY BLADDER: Incompletely distended without visible calculus. Mild circumferential bladder wall thickening may relate to underdistention. A fibrous urachal remnant/median umbilical ligament extends from the bladder dome to the umbilicus. PELVIC NODES: No lymphadenopathy. PELVIC ORGANS: No visible mass. Pelvic organs appropriate for patient age. Deep pelvic calcifications likely represent phleboliths. VASCULATURE:   Trace atherosclerotic vascular calcifications of the abdominal aorta are observed. No aneurysm is detected. RETROPERITONEUM: No mass or lymphadenopathy is apparent. BONES:   Mild scoliosis and multilevel degenerative changes of the spine are apparent. There are degenerative changes of the hips bilaterally. ABDOMINAL WALL: A small fat containing left inguinal hernia is perceived. OTHER: No free air or fluid is seen in the abdomen or pelvis. Impression   CONCLUSION:  1. Pancolonic diverticulosis with suspected short-segment proximal and distal diverticulitis. 2. Nonspecific bladder wall thickening may be accentuated by underdistention, but correlation urinalysis may be of benefit to exclude potential cystitis.      3. Suspected hepatic steatosis. 4. Lesser incidental findings as above. Dictated by (CST): Wanda Zamora MD on 6/17/2023 at 7:47 AM      Finalized by (CST): Wanda Zamora MD on 6/17/2023 at 7:54 AM       Assessment & Plan:   1. Diverticulitis large intestine w/o perforation or abscess w/o bleeding  The patient presents with an episode of abdominal pain that prompted an ED evaluation. He had a significant leukocytosis with CT imaging suggesting uncomplicated diverticulitis. He rapidly responded to antibiotic therapy. He is currently asymptomatic. We discussed diverticulosis and diverticulitis. I have recommended a high-fiber diet and avoidance of NSAIDs. I am recommending a colonoscopy to evaluate for other structural lesions including cancer and for polyp surveillance. We have offered the patient the option to schedule today, however, he wishes to call us back to arrange the colonoscopy in the next several weeks. The procedure can be arranged with either IV sedation or monitored anesthesia care and a Suprep preparation. The importance of follow-up imaging was discussed. The patient will contact us with recurrent symptoms. 2. Abnormal CT scan, colon  See above. Colonoscopy is definitively advised. 3. Gastroesophageal reflux disease with esophagitis without hemorrhage  Complicated by LA grade D esophagitis. Asymptomatic on pantoprazole. PPI maintenance should continue based on the complicated reflux. The patient may switch to OTC Prilosec or Nexium at either 20 or 40 mg (depending on breakthrough symptoms) if needed from a cough standpoint. Refills for the pantoprazole were provided.         Meds This Visit:  Requested Prescriptions     Signed Prescriptions Disp Refills    pantoprazole 40 MG Oral Tab EC 90 tablet 3     Sig: Take 1 tablet (40 mg total) by mouth before breakfast.    Na Sulfate-K Sulfate-Mg Sulf (SUPREP BOWEL PREP KIT) 17.5-3.13-1.6 GM/177ML Oral Solution 1 each 0     Sig: Take as directed       Imaging & Referrals:  None

## 2023-09-12 ENCOUNTER — OFFICE VISIT (OUTPATIENT)
Dept: INTERNAL MEDICINE CLINIC | Facility: CLINIC | Age: 78
End: 2023-09-12

## 2023-09-12 VITALS
WEIGHT: 173 LBS | BODY MASS INDEX: 24.77 KG/M2 | SYSTOLIC BLOOD PRESSURE: 144 MMHG | HEART RATE: 58 BPM | DIASTOLIC BLOOD PRESSURE: 70 MMHG | OXYGEN SATURATION: 97 % | HEIGHT: 70 IN

## 2023-09-12 DIAGNOSIS — E55.9 VITAMIN D DEFICIENCY: ICD-10-CM

## 2023-09-12 DIAGNOSIS — C44.519 BASAL CELL CARCINOMA OF CHEST: ICD-10-CM

## 2023-09-12 DIAGNOSIS — I10 PRIMARY HYPERTENSION: ICD-10-CM

## 2023-09-12 DIAGNOSIS — L40.9 PSORIASIS AND SIMILAR DISORDER: ICD-10-CM

## 2023-09-12 DIAGNOSIS — K29.50 CHRONIC GASTRITIS WITHOUT BLEEDING, UNSPECIFIED GASTRITIS TYPE: ICD-10-CM

## 2023-09-12 DIAGNOSIS — D50.9 MICROCYTIC ANEMIA: ICD-10-CM

## 2023-09-12 DIAGNOSIS — Z12.11 COLON CANCER SCREENING: ICD-10-CM

## 2023-09-12 DIAGNOSIS — Z00.00 ENCOUNTER FOR ANNUAL HEALTH EXAMINATION: Primary | ICD-10-CM

## 2023-09-12 PROCEDURE — 96160 PT-FOCUSED HLTH RISK ASSMT: CPT | Performed by: INTERNAL MEDICINE

## 2023-09-12 PROCEDURE — 3078F DIAST BP <80 MM HG: CPT | Performed by: INTERNAL MEDICINE

## 2023-09-12 PROCEDURE — 1159F MED LIST DOCD IN RCRD: CPT | Performed by: INTERNAL MEDICINE

## 2023-09-12 PROCEDURE — 1126F AMNT PAIN NOTED NONE PRSNT: CPT | Performed by: INTERNAL MEDICINE

## 2023-09-12 PROCEDURE — G0439 PPPS, SUBSEQ VISIT: HCPCS | Performed by: INTERNAL MEDICINE

## 2023-09-12 PROCEDURE — 1160F RVW MEDS BY RX/DR IN RCRD: CPT | Performed by: INTERNAL MEDICINE

## 2023-09-12 PROCEDURE — 99213 OFFICE O/P EST LOW 20 MIN: CPT | Performed by: INTERNAL MEDICINE

## 2023-09-12 PROCEDURE — 3077F SYST BP >= 140 MM HG: CPT | Performed by: INTERNAL MEDICINE

## 2023-09-12 PROCEDURE — 1170F FXNL STATUS ASSESSED: CPT | Performed by: INTERNAL MEDICINE

## 2023-09-12 PROCEDURE — 3008F BODY MASS INDEX DOCD: CPT | Performed by: INTERNAL MEDICINE

## 2023-09-12 RX ORDER — AMLODIPINE BESYLATE 5 MG/1
5 TABLET ORAL EVERY MORNING
Qty: 90 TABLET | Refills: 3 | Status: SHIPPED | OUTPATIENT
Start: 2023-09-12

## 2023-09-12 RX ORDER — PANTOPRAZOLE SODIUM 40 MG/1
40 TABLET, DELAYED RELEASE ORAL
Qty: 90 TABLET | Refills: 3 | Status: SHIPPED | OUTPATIENT
Start: 2023-09-12

## 2023-09-13 ENCOUNTER — LAB ENCOUNTER (OUTPATIENT)
Dept: LAB | Age: 78
End: 2023-09-13
Attending: INTERNAL MEDICINE
Payer: MEDICARE

## 2023-09-13 DIAGNOSIS — I10 PRIMARY HYPERTENSION: ICD-10-CM

## 2023-09-13 DIAGNOSIS — Z00.00 ENCOUNTER FOR ANNUAL HEALTH EXAMINATION: ICD-10-CM

## 2023-09-13 DIAGNOSIS — E55.9 VITAMIN D DEFICIENCY: ICD-10-CM

## 2023-09-13 DIAGNOSIS — D50.9 MICROCYTIC ANEMIA: ICD-10-CM

## 2023-09-13 LAB
ALBUMIN SERPL-MCNC: 3.7 G/DL (ref 3.4–5)
ALBUMIN/GLOB SERPL: 1.2 {RATIO} (ref 1–2)
ALP LIVER SERPL-CCNC: 143 U/L
ALT SERPL-CCNC: 26 U/L
ANION GAP SERPL CALC-SCNC: 6 MMOL/L (ref 0–18)
AST SERPL-CCNC: 14 U/L (ref 15–37)
BASOPHILS # BLD AUTO: 0.08 X10(3) UL (ref 0–0.2)
BASOPHILS NFR BLD AUTO: 0.8 %
BILIRUB SERPL-MCNC: 0.7 MG/DL (ref 0.1–2)
BUN BLD-MCNC: 17 MG/DL (ref 7–18)
BUN/CREAT SERPL: 17.2 (ref 10–20)
CALCIUM BLD-MCNC: 8.9 MG/DL (ref 8.5–10.1)
CHLORIDE SERPL-SCNC: 105 MMOL/L (ref 98–112)
CHOLEST SERPL-MCNC: 167 MG/DL (ref ?–200)
CO2 SERPL-SCNC: 30 MMOL/L (ref 21–32)
CREAT BLD-MCNC: 0.99 MG/DL
CREAT UR-SCNC: 211 MG/DL
DEPRECATED RDW RBC AUTO: 47.5 FL (ref 35.1–46.3)
EGFRCR SERPLBLD CKD-EPI 2021: 79 ML/MIN/1.73M2 (ref 60–?)
EOSINOPHIL # BLD AUTO: 2.38 X10(3) UL (ref 0–0.7)
EOSINOPHIL NFR BLD AUTO: 22.7 %
ERYTHROCYTE [DISTWIDTH] IN BLOOD BY AUTOMATED COUNT: 14.9 % (ref 11–15)
FASTING PATIENT LIPID ANSWER: YES
FASTING STATUS PATIENT QL REPORTED: YES
GLOBULIN PLAS-MCNC: 3.1 G/DL (ref 2.8–4.4)
GLUCOSE BLD-MCNC: 91 MG/DL (ref 70–99)
HCT VFR BLD AUTO: 45.2 %
HDLC SERPL-MCNC: 45 MG/DL (ref 40–59)
HGB BLD-MCNC: 15 G/DL
IMM GRANULOCYTES # BLD AUTO: 0.03 X10(3) UL (ref 0–1)
IMM GRANULOCYTES NFR BLD: 0.3 %
LDLC SERPL CALC-MCNC: 99 MG/DL (ref ?–100)
LYMPHOCYTES # BLD AUTO: 1.87 X10(3) UL (ref 1–4)
LYMPHOCYTES NFR BLD AUTO: 17.8 %
MCH RBC QN AUTO: 28.8 PG (ref 26–34)
MCHC RBC AUTO-ENTMCNC: 33.2 G/DL (ref 31–37)
MCV RBC AUTO: 86.8 FL
MICROALBUMIN UR-MCNC: 1.83 MG/DL
MICROALBUMIN/CREAT 24H UR-RTO: 8.7 UG/MG (ref ?–30)
MONOCYTES # BLD AUTO: 0.69 X10(3) UL (ref 0.1–1)
MONOCYTES NFR BLD AUTO: 6.6 %
NEUTROPHILS # BLD AUTO: 5.44 X10 (3) UL (ref 1.5–7.7)
NEUTROPHILS # BLD AUTO: 5.44 X10(3) UL (ref 1.5–7.7)
NEUTROPHILS NFR BLD AUTO: 51.8 %
NONHDLC SERPL-MCNC: 122 MG/DL (ref ?–130)
OSMOLALITY SERPL CALC.SUM OF ELEC: 293 MOSM/KG (ref 275–295)
PLATELET # BLD AUTO: 359 10(3)UL (ref 150–450)
POTASSIUM SERPL-SCNC: 3.9 MMOL/L (ref 3.5–5.1)
PROT SERPL-MCNC: 6.8 G/DL (ref 6.4–8.2)
PSA SERPL-MCNC: 1.46 NG/ML (ref ?–4)
RBC # BLD AUTO: 5.21 X10(6)UL
SODIUM SERPL-SCNC: 141 MMOL/L (ref 136–145)
TRIGL SERPL-MCNC: 129 MG/DL (ref 30–149)
TSI SER-ACNC: 3.62 MIU/ML (ref 0.36–3.74)
VIT D+METAB SERPL-MCNC: 29.1 NG/ML (ref 30–100)
VLDLC SERPL CALC-MCNC: 21 MG/DL (ref 0–30)
WBC # BLD AUTO: 10.5 X10(3) UL (ref 4–11)

## 2023-09-13 PROCEDURE — 85060 BLOOD SMEAR INTERPRETATION: CPT

## 2023-09-13 PROCEDURE — 84443 ASSAY THYROID STIM HORMONE: CPT

## 2023-09-13 PROCEDURE — 82043 UR ALBUMIN QUANTITATIVE: CPT

## 2023-09-13 PROCEDURE — 80053 COMPREHEN METABOLIC PANEL: CPT

## 2023-09-13 PROCEDURE — 36415 COLL VENOUS BLD VENIPUNCTURE: CPT

## 2023-09-13 PROCEDURE — 82570 ASSAY OF URINE CREATININE: CPT

## 2023-09-13 PROCEDURE — 82306 VITAMIN D 25 HYDROXY: CPT

## 2023-09-13 PROCEDURE — 80061 LIPID PANEL: CPT

## 2023-09-13 PROCEDURE — 85025 COMPLETE CBC W/AUTO DIFF WBC: CPT

## 2023-09-13 PROCEDURE — 84153 ASSAY OF PSA TOTAL: CPT

## 2024-02-03 ENCOUNTER — OFFICE VISIT (OUTPATIENT)
Dept: INTERNAL MEDICINE CLINIC | Facility: CLINIC | Age: 79
End: 2024-02-03

## 2024-02-03 VITALS
BODY MASS INDEX: 24.34 KG/M2 | WEIGHT: 170 LBS | RESPIRATION RATE: 18 BRPM | SYSTOLIC BLOOD PRESSURE: 145 MMHG | DIASTOLIC BLOOD PRESSURE: 74 MMHG | HEIGHT: 70 IN | HEART RATE: 55 BPM

## 2024-02-03 DIAGNOSIS — M79.602 LEFT ARM PAIN: Primary | ICD-10-CM

## 2024-02-03 PROCEDURE — 99213 OFFICE O/P EST LOW 20 MIN: CPT | Performed by: INTERNAL MEDICINE

## 2024-02-03 PROCEDURE — 1160F RVW MEDS BY RX/DR IN RCRD: CPT | Performed by: INTERNAL MEDICINE

## 2024-02-03 PROCEDURE — 3077F SYST BP >= 140 MM HG: CPT | Performed by: INTERNAL MEDICINE

## 2024-02-03 PROCEDURE — 1159F MED LIST DOCD IN RCRD: CPT | Performed by: INTERNAL MEDICINE

## 2024-02-03 PROCEDURE — 3078F DIAST BP <80 MM HG: CPT | Performed by: INTERNAL MEDICINE

## 2024-02-03 PROCEDURE — 3008F BODY MASS INDEX DOCD: CPT | Performed by: INTERNAL MEDICINE

## 2024-02-03 RX ORDER — METHYLPREDNISOLONE 4 MG/1
TABLET ORAL
Qty: 1 EACH | Refills: 0 | Status: SHIPPED | OUTPATIENT
Start: 2024-02-03

## 2024-02-03 NOTE — PATIENT INSTRUCTIONS
Neck Exercises: Active Neck Rotation  To start, lie on your back, knees bent and feet flat on the floor. Keep your ears, shoulders, and hips aligned, but don’t press your lower back to the floor. Rest your hands on your pelvis. Breathe deeply and relax.   Here are the steps for the active neck rotation:  Use your neck muscles to turn your head to one side until you feel a stretch in the muscles.  Hold for  5 seconds. Then turn to the other side.  Repeat  5 times on each side.  Note: Keep your shoulders on the floor. Don’t lift or tuck your chin as you turn your head.   [employer] last reviewed this educational content on 7/1/2020  © 6878-6363 [employer]. All rights reserved. This information is not intended as a substitute for professional medical care. Always follow your healthcare professional's instructions.        Neck Exercises: Overhead Arm Raise  To start, lie on your back, knees bent and feet flat on the floor. Keep your ears, shoulders, and hips aligned, but don’t press your lower back to the floor. Rest your hands on your pelvis. Breathe deeply and relax. Tighten the belly muscles to keep the back from arching.   Here are the steps for the arm lift:  Raise one arm overhead, then lower it. As you lower that arm, raise the other arm.  Continue to move both arms in slow, smooth arcs. Keep your arms straight and your head and neck relaxed.  Repeat  10 times with each arm.  For your safety, check with your healthcare provider before starting an exercise program.   [employer] last reviewed this educational content on 7/1/2020 © 2000-2020 [employer]. All rights reserved. This information is not intended as a substitute for professional medical care. Always follow your healthcare professional's instructions.        Neck Exercises: Head Lifts    Do this exercise on your hands and knees. Keep your knees under your hips and your hands under your shoulders. Keep your spine in a neutral  position (not arched or sagging). Keep your ears in line with your shoulders. Hold for a few seconds before starting the exercise:  Keeping your back straight, slowly drop your chin toward your chest. Tuck in your chin.  Hold for 5 seconds. Then lift your head until your neck is level with your back.  Hold for 5 seconds. Repeat 5 to10 times.  [employer] last reviewed this educational content on 3/1/2018  © 3833-8537 [employer]. All rights reserved. This information is not intended as a substitute for professional medical care. Always follow your healthcare professional's instructions.        Neck Exercises: Neck and Torso Rotation   To start, lie on your back, knees bent and feet flat on the floor. Keep your ears, shoulders, and hips aligned, but don’t press your lower back to the floor. Breathe deeply and relax.  From starting position, drop both knees to one side. At the same time, turn your head and look in the other direction.  Keep both feet in contact with the floor and keep your arms at your sides.  Hold for 5 seconds. Then slowly switch sides.  Repeat 5 to 10 times.  Lina last reviewed this educational content on 3/1/2018  © 4813-8385 [employer]. All rights reserved. This information is not intended as a substitute for professional medical care. Always follow your healthcare professional's instructions.        Neck Exercises: Neck Flex  To start, sit in a chair with your feet flat on the floor. Your weight should be slightly forward so that you’re balanced evenly on your buttocks. Relax your shoulders and keep your head level. Avoid arching your back or rounding your shoulders. Using a chair with arms may help you keep your balance:  Rest the back of your left hand against your lower back. Place your right palm on the top of your head.  Gently pull your head forward and down until you feel a stretch in the muscles in the back of your neck. Don’t force the motion.  Hold for 20  seconds, then return to starting position. Switch arms.  Repeat 5 to 10 times.    [employer] last reviewed this educational content on 3/1/2018  © 7616-0784 [employer]. All rights reserved. This information is not intended as a substitute for professional medical care. Always follow your healthcare professional's instructions.        Neck Exercises: Neck Rotation    To start, lie on your back, knees bent and feet flat on the floor. Keep your ears, shoulders, and hips aligned, but don’t press your lower back to the floor. Rest your hands on your pelvis. Breathe deeply and relax.   Here are the steps for passive neck rotation. There are 2 ways to do this exercise:    With hand. With your neck relaxed, place the palm of one hand on your forehead. Use your hand to turn your head to one side (over your shoulder) until you feel a stretch in the neck muscles. Don't push through pain.  Without hand. With your neck relaxed, turn your head to one side (over your shoulder) until you feel a stretch in the neck muscles. Don't push through pain.  Hold for  5 seconds. Then turn to the other side.  Repeat  5 times on each side.   Note: Keep your shoulders on the floor. Don’t lift your chin as you turn your head.   [employer] last reviewed this educational content on 7/1/2020  © 7098-7163 [employer]. All rights reserved. This information is not intended as a substitute for professional medical care. Always follow your healthcare professional's instructions.

## 2024-02-03 NOTE — PROGRESS NOTES
Patient ID: Constantin Tabor Jr. is a 78 year old male.  Chief Complaint   Patient presents with    Arm Pain     LT, ongoing for the past couple of weeks.           HISTORY OF PRESENT ILLNESS:   HPI  Patient presents for above.  Here with left-sided bicep pain for the past 2 weeks.  Not aware of any trauma.  Symptoms worse with rotation of his neck to the left.  Never had this before.  No radiculopathy past elbow.  Feels better when he puts his head in neutral position.    Review of Systems   Constitutional: Negative.    HENT: Negative.     Eyes: Negative.    Respiratory: Negative.     Cardiovascular: Negative.    Gastrointestinal: Negative.    Endocrine: Negative.    Genitourinary: Negative.    Musculoskeletal:  Positive for neck pain.   Skin: Negative.    Allergic/Immunologic: Negative.    Neurological: Negative.    Hematological: Negative.    Psychiatric/Behavioral: Negative.       MEDICAL HISTORY:     Past Medical History:   Diagnosis Date    Actinic keratosis 9/25/2018    Acute cervical radiculopathy 12/29/2020    BCC (basal cell carcinoma of skin) 09/2018    chest    BCC (basal cell carcinoma) 2018    left nose    Esophageal reflux     Hearing impairment     Lipid screening 02/17/2014    Mixed hyperlipidemia 3/3/2017    Need for vaccination 12/10/2019    Neoplasm of uncertain behavior of skin 9/25/2018    Non-healing skin lesion of nose 1/23/2018    Physical exam, annual 1/23/2018    Psoriasis     Unspecified essential hypertension        Past Surgical History:   Procedure Laterality Date    APPENDECTOMY  1959    COLONOSCOPY  10/2009    repeat in 2019    COLONOSCOPY N/A 7/3/2018    Procedure: COLONOSCOPY;  Surgeon: Alex Mcgee MD;  Location: [facility] ENDOSCOPY    EGD      ELECTROCARDIOGRAM, COMPLETE  02/17/2014    Scanned to media tab          Current Outpatient Medications:     methylPREDNISolone (MEDROL) 4 MG Oral Tablet Therapy Pack, As directed., Disp: 1 each, Rfl: 0    amLODIPine 5 MG Oral Tab, Take 1  tablet (5 mg total) by mouth every morning., Disp: 90 tablet, Rfl: 3    pantoprazole 40 MG Oral Tab EC, Take 1 tablet (40 mg total) by mouth before breakfast., Disp: 90 tablet, Rfl: 3    Allergies:No Known Allergies    Social History     Socioeconomic History    Marital status:      Spouse name: Not on file    Number of children: Not on file    Years of education: Not on file    Highest education level: Not on file   Occupational History    Not on file   Tobacco Use    Smoking status: Former     Types: Cigarettes     Quit date: 1972     Years since quittin.1    Smokeless tobacco: Never   Substance and Sexual Activity    Alcohol use: Yes     Alcohol/week: 2.0 standard drinks of alcohol     Types: 1 Cans of beer, 1 Standard drinks or equivalent per week     Comment: few times a month    Drug use: No    Sexual activity: Not on file   Other Topics Concern     Service Not Asked    Blood Transfusions Not Asked    Caffeine Concern Yes     Comment: Daily; 2 cups, coffee    Occupational Exposure Not Asked    Hobby Hazards Not Asked    Sleep Concern Not Asked    Stress Concern Not Asked    Weight Concern Not Asked    Special Diet Not Asked    Back Care Not Asked    Exercise Not Asked    Bike Helmet Not Asked    Seat Belt Not Asked    Self-Exams Not Asked    Grew up on a farm Not Asked    History of tanning Not Asked    Outdoor occupation Not Asked    Reaction to local anesthetic No   Social History Narrative    Not on file     Social Determinants of Health     Financial Resource Strain: Not on file   Food Insecurity: Not on file   Transportation Needs: Not on file   Physical Activity: Not on file   Stress: Not on file   Social Connections: Not on file   Housing Stability: Not on file           PHYSICAL EXAM:     Vitals:    24 1118   BP: 145/74   Pulse: 55   Resp: 18   Weight: 170 lb (77.1 kg)   Height: 5' 10\" (1.778 m)       Body mass index is 24.39 kg/m².    Physical Exam  Constitutional:        Appearance: Normal appearance.   Eyes:      General: No scleral icterus.  Musculoskeletal:      Left upper arm: Tenderness present.      Cervical back: Decreased range of motion.   Neurological:      General: No focal deficit present.      Mental Status: He is alert.   Psychiatric:         Mood and Affect: Mood normal.         Behavior: Behavior normal.           ASSESSMENT/PLAN:   1. Left arm pain  methylPREDNISolone (MEDROL) 4 MG Oral Tablet Therapy Pack; As directed.  Dispense: 1 each; Refill: 0  Several neck range of motion exercises given to be done twice a day.  Warm compresses.    Return if symptoms worsen or fail to improve.    This note was prepared using Dragon [employer] voice recognition dictation software. As a result errors may occur. When identified these errors have been corrected. While every attempt is made to correct errors during dictation discrepancies may still exist.    Fabricio Morales MD  2/3/2024

## 2024-05-10 ENCOUNTER — HOSPITAL ENCOUNTER (INPATIENT)
Facility: HOSPITAL | Age: 79
LOS: 2 days | Discharge: LEFT BEFORE DISCHARGE | DRG: 378 | End: 2024-05-12
Attending: EMERGENCY MEDICINE | Admitting: HOSPITALIST

## 2024-05-10 ENCOUNTER — APPOINTMENT (OUTPATIENT)
Dept: CT IMAGING | Facility: HOSPITAL | Age: 79
DRG: 378 | End: 2024-05-10
Attending: EMERGENCY MEDICINE

## 2024-05-10 DIAGNOSIS — K92.2 GASTROINTESTINAL HEMORRHAGE, UNSPECIFIED GASTROINTESTINAL HEMORRHAGE TYPE: Primary | ICD-10-CM

## 2024-05-10 LAB
ADENOVIRUS F 40/41 PCR: NEGATIVE
ANION GAP SERPL CALC-SCNC: 7 MMOL/L (ref 0–18)
ANTIBODY SCREEN: NEGATIVE
ASTROVIRUS PCR: NEGATIVE
BASOPHILS # BLD AUTO: 0.08 X10(3) UL (ref 0–0.2)
BASOPHILS NFR BLD AUTO: 0.6 %
BUN BLD-MCNC: 25 MG/DL (ref 9–23)
BUN/CREAT SERPL: 25.5 (ref 10–20)
C CAYETANENSIS DNA SPEC QL NAA+PROBE: NEGATIVE
C DIFF TOX B STL QL: NEGATIVE
CALCIUM BLD-MCNC: 8.4 MG/DL (ref 8.7–10.4)
CAMPY SP DNA.DIARRHEA STL QL NAA+PROBE: NEGATIVE
CHLORIDE SERPL-SCNC: 110 MMOL/L (ref 98–112)
CO2 SERPL-SCNC: 26 MMOL/L (ref 21–32)
CREAT BLD-MCNC: 0.98 MG/DL
CRYPTOSP DNA SPEC QL NAA+PROBE: NEGATIVE
DEPRECATED RDW RBC AUTO: 47.2 FL (ref 35.1–46.3)
EAEC PAA PLAS AGGR+AATA ST NAA+NON-PRB: NEGATIVE
EC STX1+STX2 + H7 FLIC SPEC NAA+PROBE: NEGATIVE
EGFRCR SERPLBLD CKD-EPI 2021: 80 ML/MIN/1.73M2 (ref 60–?)
ENTAMOEBA HISTOLYTICA PCR: NEGATIVE
EOSINOPHIL # BLD AUTO: 1.17 X10(3) UL (ref 0–0.7)
EOSINOPHIL NFR BLD AUTO: 8.3 %
EPEC EAE GENE STL QL NAA+NON-PROBE: NEGATIVE
ERYTHROCYTE [DISTWIDTH] IN BLOOD BY AUTOMATED COUNT: 15 % (ref 11–15)
ETEC LTA+ST1A+ST1B TOX ST NAA+NON-PROBE: NEGATIVE
GIARDIA LAMBLIA PCR: NEGATIVE
GLUCOSE BLD-MCNC: 135 MG/DL (ref 70–99)
HCT VFR BLD AUTO: 20.9 %
HGB BLD-MCNC: 6.7 G/DL
HGB BLD-MCNC: 7.3 G/DL
IMM GRANULOCYTES # BLD AUTO: 0.09 X10(3) UL (ref 0–1)
IMM GRANULOCYTES NFR BLD: 0.6 %
INR BLD: 1.07 (ref 0.8–1.2)
LYMPHOCYTES # BLD AUTO: 2.22 X10(3) UL (ref 1–4)
LYMPHOCYTES NFR BLD AUTO: 15.7 %
MCH RBC QN AUTO: 27.9 PG (ref 26–34)
MCHC RBC AUTO-ENTMCNC: 32.1 G/DL (ref 31–37)
MCV RBC AUTO: 87.1 FL
MONOCYTES # BLD AUTO: 0.84 X10(3) UL (ref 0.1–1)
MONOCYTES NFR BLD AUTO: 6 %
NEUTROPHILS # BLD AUTO: 9.71 X10 (3) UL (ref 1.5–7.7)
NEUTROPHILS # BLD AUTO: 9.71 X10(3) UL (ref 1.5–7.7)
NEUTROPHILS NFR BLD AUTO: 68.8 %
NOROVIRUS GI/GII PCR: NEGATIVE
OSMOLALITY SERPL CALC.SUM OF ELEC: 302 MOSM/KG (ref 275–295)
P SHIGELLOIDES DNA STL QL NAA+PROBE: NEGATIVE
PLATELET # BLD AUTO: 280 10(3)UL (ref 150–450)
POTASSIUM SERPL-SCNC: 3.9 MMOL/L (ref 3.5–5.1)
PROTHROMBIN TIME: 14.6 SECONDS (ref 11.6–14.8)
RBC # BLD AUTO: 2.4 X10(6)UL
RH BLOOD TYPE: POSITIVE
ROTAVIRUS A PCR: NEGATIVE
SALMONELLA DNA SPEC QL NAA+PROBE: NEGATIVE
SAPOVIRUS PCR: NEGATIVE
SHIGELLA SP+EIEC IPAH ST NAA+NON-PROBE: NEGATIVE
SODIUM SERPL-SCNC: 143 MMOL/L (ref 136–145)
V CHOLERAE DNA SPEC QL NAA+PROBE: NEGATIVE
VIBRIO DNA SPEC NAA+PROBE: NEGATIVE
WBC # BLD AUTO: 14.1 X10(3) UL (ref 4–11)
YERSINIA DNA SPEC NAA+PROBE: NEGATIVE

## 2024-05-10 PROCEDURE — 99223 1ST HOSP IP/OBS HIGH 75: CPT | Performed by: HOSPITALIST

## 2024-05-10 PROCEDURE — 74177 CT ABD & PELVIS W/CONTRAST: CPT | Performed by: EMERGENCY MEDICINE

## 2024-05-10 PROCEDURE — 30233N1 TRANSFUSION OF NONAUTOLOGOUS RED BLOOD CELLS INTO PERIPHERAL VEIN, PERCUTANEOUS APPROACH: ICD-10-PCS | Performed by: EMERGENCY MEDICINE

## 2024-05-10 RX ORDER — ONDANSETRON 2 MG/ML
4 INJECTION INTRAMUSCULAR; INTRAVENOUS EVERY 6 HOURS PRN
Status: DISCONTINUED | OUTPATIENT
Start: 2024-05-10 | End: 2024-05-12

## 2024-05-10 RX ORDER — METOCLOPRAMIDE HYDROCHLORIDE 5 MG/ML
10 INJECTION INTRAMUSCULAR; INTRAVENOUS EVERY 8 HOURS PRN
Status: DISCONTINUED | OUTPATIENT
Start: 2024-05-10 | End: 2024-05-12

## 2024-05-10 RX ORDER — ACETAMINOPHEN 500 MG
500 TABLET ORAL EVERY 4 HOURS PRN
Status: DISCONTINUED | OUTPATIENT
Start: 2024-05-10 | End: 2024-05-12

## 2024-05-10 RX ORDER — SODIUM CHLORIDE 9 MG/ML
INJECTION, SOLUTION INTRAVENOUS CONTINUOUS
Status: DISCONTINUED | OUTPATIENT
Start: 2024-05-10 | End: 2024-05-11

## 2024-05-10 NOTE — ED PROVIDER NOTES
Patient Seen in: [facility]         EMERGENCY DEPARTMENT NOTE    Dictated. Voice Transcription software has been utilized for this dictation (the reader should be aware that typographical errors are possible with voice transcription software and to please contact the dictating physician if there are questions.)         History     Chief Complaint   Patient presents with    GI Bleeding       There may be discrepancies from triage note.     HPI    History provided by patient and patient's wife.  78-year-old male, history of diverticulosis and diverticulitis complaining of noting blood mixed in with the stool over the last 3 days.  No abdominal pain.  States that these are similar symptoms to when he has had diverticulitis.  Wife states that patient follows with our GI clinician, Dr. Iverson.  Wife states that patient has been having lightheadedness and feels generally weak over the last 3 days.  Patient denies bleeding from any orifice.  Denies anticoagulation/antiplatelet use    No fevers, chills, nausea, vomiting, diarrhea, constipation, cough, cold symptoms, urinary complaints.  No chest pain, shortness of breath  No headache, neck pain, neck stiffness, incontinence.  No changes in mentation, no changes in vision, no total/new extremity weakness, no total/new extremity paresthesia, no difficulty speaking.  No alleviating or exacerbating factors.  Denies orthopnea, pnd, change in exercise tolerance limited by chest pain/sob , lower extremity edema/asymmetry.       History reviewed.   Past Medical History:    Actinic keratosis    Acute cervical radiculopathy    BCC (basal cell carcinoma of skin)    chest    BCC (basal cell carcinoma)    left nose    Esophageal reflux    Hearing impairment    Lipid screening    Mixed hyperlipidemia    Need for vaccination    Neoplasm of uncertain behavior of skin    Non-healing skin lesion of nose    Physical exam, annual    Psoriasis    Unspecified essential hypertension        History reviewed.   Past Surgical History:   Procedure Laterality Date    Appendectomy      Colonoscopy  10/2009    repeat in 2019    Colonoscopy N/A 7/3/2018    Procedure: COLONOSCOPY;  Surgeon: Alex Mcgee MD;  Location: [facility] ENDOSCOPY    Egd      Electrocardiogram, complete  2014    Scanned to media tab          Medications :  (Not in a hospital admission)       Family History   Problem Relation Age of Onset    Diabetes Mother     Cancer Father         lung-smoker    Diabetes Sister        Smoking Status:   Social History     Socioeconomic History    Marital status:    Tobacco Use    Smoking status: Former     Current packs/day: 0.00     Types: Cigarettes     Quit date: 1972     Years since quittin.3    Smokeless tobacco: Never   Substance and Sexual Activity    Alcohol use: Yes     Alcohol/week: 2.0 standard drinks of alcohol     Types: 1 Cans of beer, 1 Standard drinks or equivalent per week     Comment: few times a month    Drug use: No   Other Topics Concern    Caffeine Concern Yes     Comment: Daily; 2 cups, coffee    Reaction to local anesthetic No       Review of Systems   Constitutional: Negative.    HENT: Negative.     Eyes: Negative.    Respiratory: Negative.     Cardiovascular: Negative.    Gastrointestinal:  Positive for blood in stool. Negative for abdominal pain, constipation, diarrhea and melena.   Genitourinary: Negative.    Musculoskeletal: Negative.    Skin: Negative.    Neurological: Negative.    Endo/Heme/Allergies: Negative.    Psychiatric/Behavioral: Negative.     All other systems reviewed and are negative.    Pertinent positives as listed.  All other organ systems are reviewed and are negative.    Constitutional and vital signs reviewed.      Social History and Family History elements reviewed from today, pertinent positives to the presenting problem noted.    Physical Exam     ED Triage Vitals [05/10/24 1016]   /65   Pulse 84   Resp 16   Temp  98 °F (36.7 °C)   Temp src Temporal   SpO2 99 %   O2 Device None (Room air)       All measures to prevent infection transmission during my interaction with the patient were taken. The patient was already wearing a droplet mask on my arrival to the room. Personal protective equipment including droplet mask, eye protection, and gloves were worn throughout the duration of the exam.  Handwashing was performed prior to and after the exam.  Stethoscope and any equipment used during my examination was cleaned with super sani-cloth germicidal wipes following the exam.     Physical Exam  Vitals and nursing note reviewed.   Constitutional:       General: He is not in acute distress.     Appearance: He is not ill-appearing or toxic-appearing.      Comments: Smiles     Cardiovascular:      Rate and Rhythm: Normal rate and regular rhythm.      Comments: Dorsalis pedis pulses 2+ bilaterally    Pulmonary:      Effort: Pulmonary effort is normal. No respiratory distress.   Abdominal:      General: There is no distension.      Palpations: Abdomen is soft.      Tenderness: There is no abdominal tenderness. There is no guarding or rebound.      Comments: Negative Hernandez sign, negative McBurney's point tenderness     Genitourinary:     Comments: Red/black stool, heme +   Musculoskeletal:      Right lower leg: No edema.      Left lower leg: No edema.   Skin:     Capillary Refill: Capillary refill takes less than 2 seconds.      Coloration: Skin is not jaundiced or pale.      Findings: No bruising, erythema, lesion or rash.   Neurological:      General: No focal deficit present.      Mental Status: He is alert and oriented to person, place, and time.      Comments: Gross motor and sensory function intact symmetrically and bilaterally to upper extremities and lower extremities.   Psychiatric:         Mood and Affect: Mood normal.         Behavior: Behavior normal.           Review of prior notes in Care everywhere/Epic performed by  myself:  Pt had upper and lower endoscopy 2018 and was found to have 2  ubular adenomas, colonic lipomata, extensive pancolonic diverticulosis, LA grade D esophagitis, and a hiatal hernia (5 cm) with a possible slight esophageal stricture.  The stricture was dilated and the patient was placed on pantoprazole at that time   -pt had ct 6/20293 revealing pan diverticulosis , fatty liver   -Hemoglobin September 20 23-15.  Today's hemoglobin 6.7, dramatic hemoglobin drop.    ED Course     If labs obtained, they are personally reviewed by myself:     Labs Reviewed   BASIC METABOLIC PANEL (8) - Abnormal; Notable for the following components:       Result Value    Glucose 135 (*)     BUN 25 (*)     BUN/CREA Ratio 25.5 (*)     Calcium, Total 8.4 (*)     Calculated Osmolality 302 (*)     All other components within normal limits   CBC W/ DIFFERENTIAL - Abnormal; Notable for the following components:    WBC 14.1 (*)     RBC 2.40 (*)     HGB 6.7 (*)     HCT 20.9 (*)     RDW-SD 47.2 (*)     Neutrophil Absolute Prelim 9.71 (*)     Neutrophil Absolute 9.71 (*)     Eosinophil Absolute 1.17 (*)     All other components within normal limits   PROTHROMBIN TIME (PT) - Normal   C. DIFFICILE(TOXIGENIC)PCR - Normal   CBC WITH DIFFERENTIAL WITH PLATELET    Narrative:     The following orders were created for panel order CBC With Differential With Platelet.                  Procedure                               Abnormality         Status                                     ---------                               -----------         ------                                     CBC W/ DIFFERENTIAL[[id number]]          Abnormal            Final result                                                 Please view results for these tests on the individual orders.   SCAN SLIDE   MD BLOOD SMEAR CONSULT   TYPE AND SCREEN    Narrative:     The following orders were created for panel order Type and screen.                  Procedure                                Abnormality         Status                                     ---------                               -----------         ------                                     ABORH (Blood Type)[[id number]]                               Final result                               Antibody Screen[[id number]]                                  Final result                                                 Please view results for these tests on the individual orders.   ABORH (BLOOD TYPE)   ANTIBODY SCREEN   PREPARE RBC   GI STOOL PANEL BY PCR    Narrative:     The GI Panel tests for Campylobacter species jejuni, coli, and                   upsaliensis; all species, subspecies, and serovars of Salmonella;                   and Vibrio species parahaemolyticus, vulnificus, and cholera.                   It does NOT test for Aeromonas or Edwardsiella species.                         If radiologic studies ordered during today's ER visit, my independent interpretation are seen directly below.  This is awaiting the radiologist's final interpretation.  CT abdomen/pelvis, independent interpretation completed by myself, awaiting final radiology interpretation: No perforation      Imaging Results read by radiology in ED: CT ABDOMEN+PELVIS(CONTRAST ONLY)(CPT=74177)    Result Date: 5/10/2024  CONCLUSION:  1. Colonic diverticulosis.  There is mild diffuse pericolonic inflammatory stranding, which raises suspicion for infectious/inflammatory colitis.  No convincing CT manifestations of acute diverticulitis (pericolonic inflammation is not centered along diverticular disease).  No definite abnormal extravasation of contrast into the gastrointestinal tract to suggest an active source of hemorrhage by CT. 2. Stable moderate retrocardiac hiatal hernia. 3. Sequelae of remote granulomatous disease. 4. Lesser incidental findings as above.   elm-remote  Dictated by (CST): Joel Car MD on 5/10/2024 at 1:06 PM     Finalized by (CST): Josep  MD Joel on 5/10/2024 at 1:15 PM               ED Medications Administered:   Medications   pantoprazole (Protonix) 40 mg in sodium chloride 0.9% PF 10 mL IV push (has no administration in time range)   iopamidol 76% (ISOVUE-370) injection for power injector (80 mL Intravenous Given 5/10/24 1300)           Vitals:    05/10/24 1016 05/10/24 1145 05/10/24 1245 05/10/24 1300   BP: 111/65 106/60 128/56 108/57   Pulse: 84 71 83 71   Resp: 16 17 16 18   Temp: 98 °F (36.7 °C)      TempSrc: Temporal      SpO2: 99% 98% 98% 98%   Weight: 81.6 kg      Height: 177.8 cm (5' 10\")        *I personally reviewed and interpreted all ED vitals.    Pulse Ox interpretation by myself: 99%, Room air, Normal     Monitor Interpretation by myself:   normal sinus rhythm    If Ekg obtained during today's visit, it is independently interpreted by myself directly below:      Medical Record Review: I personally reviewed available prior medical records for any recent pertinent discharge summaries, testing, and procedures and reviewed those reports.      [facility]     Medical decision making/ED Course:   78-year-old male complaining of 3 days of blood in stool.  No abdominal pain, no abdominal tenderness elicited.  Equal distal pulses with normal blood pressure and heart rate on initial interview.  Symptoms seem most consistent with GI bleed.  Stool exam revealing dark red blood that is strongly heme positive.  Wife states that patient has been reporting fatigue over the last 3 days.  Hemoglobin of 6.7.  Upon chart review patient's last hemoglobin 15 in September 2023.  1 unit of PRBCs ordered and patient to start this blood transfusion in the emergency room.  Glucose, sodium, potassium essentially normal.  Creatinine normal.  Stool culture sent and negative for viral illness.  INR normal at 1.07.  No use of anticoagulation/antiplatelet use per his history.  Case discussed with GI on-call and hospitalist listed admission order who agrees with ER  management and starting pantoprazole 40 mg IV.  Upon numerous reevaluations, patient remained in no distress, smiling and hemodynamically stable suggesting imminent life-threatening GI bleed.  Patient will likely benefit from endoscopy/colonoscopy, GI to discuss this with the patient  CT scan negative for diverticulitis.  Brisk GI bleed, ttp, DIC, anemia, hemodynamic instability, among other life-threatening medical conditions considered and seems unlikely given patient's history, exam, and appearance.  Pt agrees and is aware of plan.         Differential Diagnosis:  as listed above in medical decision making.   *Please note that in the presenting to the emergency department, illness/injury that poses a threat to life or function is considered during this patient's initial evaluation.    The complexity of this visit is therefore inherently more complex given the need to consider life threatening pathology prior to any other etiology for this patient's visit.    The differential diagnosis and medical decision above exemplify this rationale.       Medical Decision Making  Problems Addressed:  Gastrointestinal hemorrhage, unspecified gastrointestinal hemorrhage type: acute illness or injury    Amount and/or Complexity of Data Reviewed  Independent Historian: carley  External Data Reviewed: labs, radiology and notes.  Labs: ordered. Decision-making details documented in ED Course.  Radiology: ordered and independent interpretation performed. Decision-making details documented in ED Course.  ECG/medicine tests: ordered and independent interpretation performed. Decision-making details documented in ED Course.  Discussion of management or test interpretation with external provider(s): Gastroenterologist listed in order  Hospitalist listed in order    Risk  Decision regarding hospitalization.  Elective major surgery with identified risk factors.    Critical Care  Total time providing critical care: 34 minutes          ED  Course as of 05/10/24 1334  ------------------------------------------------------------  Time: 05/10 1145  Comment: Patient remains in no distress, smiling, hemodynamically stable with no signs of hemodynamic instability.  Normal blood pressure, no tachycardia  ------------------------------------------------------------  Time: 05/10 1215  Comment: No hypotension, no tachycardia, he remains in good spirits, no distress  ------------------------------------------------------------  Time: 05/10 1300  Comment: Patient smiling, no distress, ambulating in room.  Hemodynamically remains stable with no hypotension and blood pressure of 108 systolic.  Pulse less than 100.       Vitals:    05/10/24 1016 05/10/24 1145 05/10/24 1245 05/10/24 1300   BP: 111/65 106/60 128/56 108/57   Pulse: 84 71 83 71   Resp: 16 17 16 18   Temp: 98 °F (36.7 °C)      TempSrc: Temporal      SpO2: 99% 98% 98% 98%   Weight: 81.6 kg      Height: 177.8 cm (5' 10\")                Complicating Factors: Significant medical problems that contribute to the complexity of this emergency room evaluation is listed above.    Condition upon leaving the department: Stable    Disposition and Plan     Clinical Impression:  1. Gastrointestinal hemorrhage, unspecified gastrointestinal hemorrhage type        Disposition:  Admit    Medications Prescribed:  Current Discharge Medication List              Hospital Problems       Present on Admission  Date Reviewed: 2/3/2024            ICD-10-CM Noted POA    * (Principal) Gastrointestinal hemorrhage, unspecified gastrointestinal hemorrhage type K92.2 5/10/2024 Unknown

## 2024-05-10 NOTE — H&P (VIEW-ONLY)
Is this a shared or split note between Advanced Practice Provider and Physician? Yes      [facility]   Gastroenterology Consultation Note    Constantin Tabor Jr.  Patient Status:    Emergency  Date of Admission:         5/10/2024, Hospital day #0  Attending:   Chapis Lazo MD  PCP:     Fabricio Morales MD    Reason for Consultation:  Rectal Bleeding, Anemia    History of Present Illness:  Constantin Tabor Jr. is a 78 year old male w/ PMHx of Diverticulosis c/b recurrent diverticulitis (2 previous episodes), GERD, esophagitis, hiatal hernia, esophageal stricture requiring dilation, actinic keratosis, cervical radiculopathy, HLD, who presents to the ED with diarrhea and rectal bleeding.    Pt's wife at bedside and assisted with HPI.    Pt states that he ate some old watermelon that was cut up in the refrigerator prior to the onset of watery stools on Wednesday.  He reports brown stools had small amount of BRB in the toilet bowl as well.  Watery stools continued every 2-3 hours through today but amount of blood worsened with change in color to a dark/maroon color, with mostly blood mixed with stool.  He denies ABD pain, N/V, dyspepsia, dysphagia, constipation, black stools, fever, chills, chest pain, SOB and unintentional weight loss.  He denies travel outside the [place], recent ABX use and other sick contacts.  He reports frequent NSAID use for shoulder pain.  He notes having diverticulitis in the past but does not feel this is the same as he has no pain.    Per chart review, Pt presented to ED 6/2023 for ABD pain, which he attributed to taking NSAIDs on empty stomach.  CT at that time revealed proximal and distal diverticulitis.  Pt given 1 dose of IV unasyn and discharged on 7 days of Augmentin.  He was recommended to have follow up imaging and repeat colonoscopy within 3 months, but was lost to follow.    Pertinent Family Hx:  - No known history of esophageal, gastric or colon cancers  - No known IBD  - No known  liver pathologies    Endoscopy Hx:  - EGD 10/2018:  Impression:  1.  Healed esophagitis  2.  Hiatal hernia with focal residual gastritis    -EGD/CLN 7/2018:   Impression:  1.  Cecal polyps  2.  Multiple colonic nodules benign appearance  3.  Extensive pancolonic diverticulosis (including rectal)  4.  Colonic lipomata  5.  LA grade D esophagitis  6.  Slight esophageal stricture  7.  5 cm hiatal hernia  8.  Status post esophageal dilatation to 16.5 mm TTS.    Social Hx:  - No tobacco use  - No ETOH  - Denies cannabis/illicit drug use  - Lives with wife  - Occupation: [employer]     History:  Past Medical History:    Actinic keratosis    Acute cervical radiculopathy    BCC (basal cell carcinoma of skin)    chest    BCC (basal cell carcinoma)    left nose    Esophageal reflux    Hearing impairment    Lipid screening    Mixed hyperlipidemia    Need for vaccination    Neoplasm of uncertain behavior of skin    Non-healing skin lesion of nose    Physical exam, annual    Psoriasis    Unspecified essential hypertension     Past Surgical History:   Procedure Laterality Date    Appendectomy  1959    Colonoscopy  10/2009    repeat in 2019    Colonoscopy N/A 7/3/2018    Procedure: COLONOSCOPY;  Surgeon: Alex Mcgee MD;  Location: [facility] ENDOSCOPY    Egd      Electrocardiogram, complete  02/17/2014    Scanned to media tab      Family History   Problem Relation Age of Onset    Diabetes Mother     Cancer Father         lung-smoker    Diabetes Sister       reports that he quit smoking about 52 years ago. His smoking use included cigarettes. He has never used smokeless tobacco. He reports current alcohol use of about 2.0 standard drinks of alcohol per week. He reports that he does not use drugs.    Allergies:  No Known Allergies    Medications:    Current Facility-Administered Medications:     pantoprazole (Protonix) 40 mg in sodium chloride 0.9% PF 10 mL IV push, 40 mg, Intravenous, Once    Review of Systems:    CONSTITUTIONAL:  negative for fevers, chills, unintentional weight loss   EYES:  negative for diplopia or change in vision   RESPIRATORY:  negative for severe shortness of breath  CARDIOVASCULAR:  negative for crushing sub-sternal chest pain  GASTROINTESTINAL:  see HPI  GENITOURINARY:  negative for dysuria or gross hematuria  INTEGUMENT/BREAST:  SKIN:  negative for jaundice or new rash   ALLERGIC/IMMUNOLOGIC:  negative for hay fever   ENDOCRINE:  negative for cold intolerance and heat intolerance  MUSCULOSKELETAL:  negative for joint effusion/severe erythema  NEURO: negative for new loss of consciousness or dizziness   BEHAVIOR/PSYCH:  negative for psychotic behavior    Physical Exam:    Blood pressure 106/60, pulse 71, temperature 98 °F (36.7 °C), temperature source Temporal, resp. rate 17, height 5' 10\" (1.778 m), weight 180 lb (81.6 kg), SpO2 98%. Body mass index is 25.83 kg/m².    Gen: awake, alert patient, NAD  HEENT: EOMI, the sclera appears anicteric, oropharynx clear, mucus membranes appear moist  CV: RRR  Lung: no conversational dyspnea   Abdomen: soft NTND abdomen with NABS appreciated   Back: No CVA tenderness   Skin: dry, warm, no jaundice  Ext: no LE edema is evident  Neuro: Alert, oriented x4 and interactive  Psych: calm, cooperative    Laboratory Data:  Lab Results   Component Value Date    WBC 14.1 05/10/2024    HGB 6.7 05/10/2024    HCT 20.9 05/10/2024    .0 05/10/2024    CREATSERUM 0.98 05/10/2024    BUN 25 05/10/2024     05/10/2024    K 3.9 05/10/2024     05/10/2024    CO2 26.0 05/10/2024     05/10/2024    CA 8.4 05/10/2024    INR 1.07 05/10/2024       Imaging:  No results found.    Assessment & Plan   Constantin Tabor Jr. is a 78 year old male w/ PMHx of Diverticulosis c/b recurrent diverticulitis (2 previous episodes), GERD, esophagitis, hiatal hernia, esophageal stricture requiring dilation, actinic keratosis, cervical radiculopathy, HLD, who presents to the ED with  diarrhea and rectal bleeding.    #Rectal Bleeding  #Diarrhea  #Anemia  -Pt reports watery/bloody diarrhea starting Wednesday that has worsened in frequency and amount of blood.  He reports current NSAID use r/t shoulder pain.  -Labs on admission reveal acute anemia from baseline Hgb 14-16 to 6.7 associated with weakness/fatigue.  WBC 14 with ANC 9.7, BUN 25  -Pt with hx of diverticulitis but denies ABD pain  -CT A/P pending  -Dark loose stool noted in the ED bathroom  -Last EGD/CLN 2018 with esophageal stricture requiring dilation, esophagitis and diverticulosis  -Etiology of bleeding unclear with reported BRBPR, now marroon/black stools concerning for possible upper GIB in setting of NSAID use vs diverticular bleed with known severe diverticulosis vs less likely neoplasm.  Discussed risk/benefit of EGD/CLN with Pt who states understanding and is willing to proceed.    EGD consent: I have discussed the risks, benefits, and alternatives to upper endoscopy/enteroscopy with the patient/primary decision maker [who demonstrated understanding], including but not limited to the risks of bleeding, infection, pain, death, as well as the risks of anesthesia and perforation all leading to prolonged hospitalization, surgical intervention, or even death. I also specifically mentioned the miss rate of upper endoscopy of 5-10% in the best of all circumstances.  The patient has agreed to sign an informed consent and elected to proceed with procedure with possible intervention [i.e. polypectomy, possible dilation, stent placement, etc.] as indicated.    Colonoscopy consent: I have discussed the risks, benefits, and alternatives to colonoscopy with the patient/primary decision maker [who demonstrated understanding], including but not limited to the risks of bleeding, infection, pain, death, as well as the risks of anesthesia and perforation all leading to prolonged hospitalization, surgical intervention, or even death. I also  specifically mentioned the miss rate of colonoscopy of 5-10% in the best of all circumstances. The patient has agreed to sign an informed consent and elected to proceed with colonoscopy with possible intervention [i.e. polypectomy, stent placement, etc.] as indicated.     Recommend:  -1 unit PRBCs  -Ok for clear liquids from GI stance  -EGD/colonoscopy tomorrow  -2L Gol[place]ely 1800, 2L at 0300  -IV PPI BID  -Trend Hgb, transfuse to >7  -Monitor for overt GIB  -Stool studies    Thank you for the opportunity to participate in the care of this patient.    Case discussed with Shari Lopez MD.    Mariaelena Cole DNP, FNP-BC  [facility]astro[facility]  5/10/2024

## 2024-05-10 NOTE — H&P
[facility]    PATIENT'S NAME: MEGHAN JOSUE JR.   ATTENDING PHYSICIAN: Chapis Lazo MD   PATIENT ACCOUNT#:   [id number]    LOCATION:  [address]  MEDICAL RECORD #:   [id number]       YOB: 1945  ADMISSION DATE:       05/10/2024    HISTORY AND PHYSICAL EXAMINATION    #####EDITING MAY BE REQUIRED#####    CHIEF COMPLAINT:  Melena/gastrointestinal bleed and posthemorrhagic anemia.    HISTORY OF PRESENT ILLNESS:  The patient is a 78-year-old  male who came into the emergency department for evaluation of dark bowel movements and progressive weakness for the last 2 to 3 weeks.  CBC showed hemoglobin of 6.7 which has dropped from a baseline of 15, white blood cell count of 14.1.  Chemistry showed BUN/creatinine ratio of 25.5.  PT/INR normal.  The patient was initiated on Protonix and blood transfusion.  CT scan of the abdomen is still pending.    PAST MEDICAL HISTORY:  He had prior gastrointestinal bleed; upper endoscopy showed esophagitis and hiatal hernia, colonoscopy showed diverticulosis.  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    PAST SURGICAL HISTORY:  Appendectomy.    MEDICATIONS:  Please see medication reconciliation list.  The patient reported that he takes Aleve NSAID at least 2 to 3 times a week for chronic shoulder pain.    ALLERGIES:  No known drug allergies.    SOCIAL HISTORY:  Ex-tobacco user.  Social alcohol.  No drug use.    FAMILY HISTORY:  Mother had diabetes mellitus type 2.  Father had lung cancer.    REVIEW OF SYSTEMS:  The patient reports that he has been seeing dark bowel movements on a daily basis for at least 2 weeks with progressive fatigue, weakness, and dyspnea on exertion.  He denies any significant abdominal pain.  No fever or chills.  No recent antibiotic use.  Other 12-point review of systems negative.    PHYSICAL EXAMINATION:  GENERAL:  Alert.   Oriented to time, place, and person.  Pale in color.  No acute distress.  VITAL SIGNS:  Temperature 98.0, pulse 71, respiratory rate 17, blood pressure 106/60, pulse ox 98% on room air.  HEENT:  Atraumatic.  Oropharynx clear.  Moist mucous membranes.  Ears and nose normal.  Eyes:  Anicteric sclerae.  NECK:  Supple.  No lymphadenopathy.  Trachea midline.  Full range of motion.  LUNGS:  Clear to auscultation bilaterally.  Normal respiratory effort.  HEART:  Regular rate and rhythm.  S1 and S2 auscultated.  No murmur.  ABDOMEN:  Soft, nondistended.  No tenderness.  Positive bowel sounds.  EXTREMITIES:  No peripheral edema, clubbing, or cyanosis.  NEUROLOGIC:  Motor and sensory intact.    ASSESSMENT AND PLAN:  1.   Melena/gastrointestinal bleed, most likely related to NSAID use.  Rule out peptic ulcer disease or _________  esophagitis.  2.   Profound posthemorrhagic anemia.     Patient will be admitted to general medical floor.  IV Protonix.  Blood transfusion.  Monitor hemodynamic status.  Clear liquid diet.  Gastroenterology consult.  Further recommendations to follow.    Dictated By Lv Urban MD  d: 05/10/2024 12:34:00  t: 05/10/2024 13:12:03  Job [id number]  FB/    cc: Chapis Lazo MD

## 2024-05-10 NOTE — CONSULTS
Is this a shared or split note between Advanced Practice Provider and Physician? Yes      [facility]   Gastroenterology Consultation Note    Constantin Tabor Jr.  Patient Status:    Emergency  Date of Admission:         5/10/2024, Hospital day #0  Attending:   Chapis Lazo MD  PCP:     Fabricio Morales MD    Reason for Consultation:  Rectal Bleeding, Anemia    History of Present Illness:  Constantin Tabor Jr. is a 78 year old male w/ PMHx of Diverticulosis c/b recurrent diverticulitis (2 previous episodes), GERD, esophagitis, hiatal hernia, esophageal stricture requiring dilation, actinic keratosis, cervical radiculopathy, HLD, who presents to the ED with diarrhea and rectal bleeding.    Pt's wife at bedside and assisted with HPI.    Pt states that he ate some old watermelon that was cut up in the refrigerator prior to the onset of watery stools on Wednesday.  He reports brown stools had small amount of BRB in the toilet bowl as well.  Watery stools continued every 2-3 hours through today but amount of blood worsened with change in color to a dark/maroon color, with mostly blood mixed with stool.  He denies ABD pain, N/V, dyspepsia, dysphagia, constipation, black stools, fever, chills, chest pain, SOB and unintentional weight loss.  He denies travel outside the [place], recent ABX use and other sick contacts.  He reports frequent NSAID use for shoulder pain.  He notes having diverticulitis in the past but does not feel this is the same as he has no pain.    Per chart review, Pt presented to ED 6/2023 for ABD pain, which he attributed to taking NSAIDs on empty stomach.  CT at that time revealed proximal and distal diverticulitis.  Pt given 1 dose of IV unasyn and discharged on 7 days of Augmentin.  He was recommended to have follow up imaging and repeat colonoscopy within 3 months, but was lost to follow.    Pertinent Family Hx:  - No known history of esophageal, gastric or colon cancers  - No known IBD  - No known  liver pathologies    Endoscopy Hx:  - EGD 10/2018:  Impression:  1.  Healed esophagitis  2.  Hiatal hernia with focal residual gastritis    -EGD/CLN 7/2018:   Impression:  1.  Cecal polyps  2.  Multiple colonic nodules benign appearance  3.  Extensive pancolonic diverticulosis (including rectal)  4.  Colonic lipomata  5.  LA grade D esophagitis  6.  Slight esophageal stricture  7.  5 cm hiatal hernia  8.  Status post esophageal dilatation to 16.5 mm TTS.    Social Hx:  - No tobacco use  - No ETOH  - Denies cannabis/illicit drug use  - Lives with wife  - Occupation: [employer]     History:  Past Medical History:    Actinic keratosis    Acute cervical radiculopathy    BCC (basal cell carcinoma of skin)    chest    BCC (basal cell carcinoma)    left nose    Esophageal reflux    Hearing impairment    Lipid screening    Mixed hyperlipidemia    Need for vaccination    Neoplasm of uncertain behavior of skin    Non-healing skin lesion of nose    Physical exam, annual    Psoriasis    Unspecified essential hypertension     Past Surgical History:   Procedure Laterality Date    Appendectomy  1959    Colonoscopy  10/2009    repeat in 2019    Colonoscopy N/A 7/3/2018    Procedure: COLONOSCOPY;  Surgeon: Alex Mcgee MD;  Location: [facility] ENDOSCOPY    Egd      Electrocardiogram, complete  02/17/2014    Scanned to media tab      Family History   Problem Relation Age of Onset    Diabetes Mother     Cancer Father         lung-smoker    Diabetes Sister       reports that he quit smoking about 52 years ago. His smoking use included cigarettes. He has never used smokeless tobacco. He reports current alcohol use of about 2.0 standard drinks of alcohol per week. He reports that he does not use drugs.    Allergies:  No Known Allergies    Medications:    Current Facility-Administered Medications:     pantoprazole (Protonix) 40 mg in sodium chloride 0.9% PF 10 mL IV push, 40 mg, Intravenous, Once    Review of Systems:    CONSTITUTIONAL:  negative for fevers, chills, unintentional weight loss   EYES:  negative for diplopia or change in vision   RESPIRATORY:  negative for severe shortness of breath  CARDIOVASCULAR:  negative for crushing sub-sternal chest pain  GASTROINTESTINAL:  see HPI  GENITOURINARY:  negative for dysuria or gross hematuria  INTEGUMENT/BREAST:  SKIN:  negative for jaundice or new rash   ALLERGIC/IMMUNOLOGIC:  negative for hay fever   ENDOCRINE:  negative for cold intolerance and heat intolerance  MUSCULOSKELETAL:  negative for joint effusion/severe erythema  NEURO: negative for new loss of consciousness or dizziness   BEHAVIOR/PSYCH:  negative for psychotic behavior    Physical Exam:    Blood pressure 106/60, pulse 71, temperature 98 °F (36.7 °C), temperature source Temporal, resp. rate 17, height 5' 10\" (1.778 m), weight 180 lb (81.6 kg), SpO2 98%. Body mass index is 25.83 kg/m².    Gen: awake, alert patient, NAD  HEENT: EOMI, the sclera appears anicteric, oropharynx clear, mucus membranes appear moist  CV: RRR  Lung: no conversational dyspnea   Abdomen: soft NTND abdomen with NABS appreciated   Back: No CVA tenderness   Skin: dry, warm, no jaundice  Ext: no LE edema is evident  Neuro: Alert, oriented x4 and interactive  Psych: calm, cooperative    Laboratory Data:  Lab Results   Component Value Date    WBC 14.1 05/10/2024    HGB 6.7 05/10/2024    HCT 20.9 05/10/2024    .0 05/10/2024    CREATSERUM 0.98 05/10/2024    BUN 25 05/10/2024     05/10/2024    K 3.9 05/10/2024     05/10/2024    CO2 26.0 05/10/2024     05/10/2024    CA 8.4 05/10/2024    INR 1.07 05/10/2024       Imaging:  No results found.    Assessment & Plan   Constantin Tabor Jr. is a 78 year old male w/ PMHx of Diverticulosis c/b recurrent diverticulitis (2 previous episodes), GERD, esophagitis, hiatal hernia, esophageal stricture requiring dilation, actinic keratosis, cervical radiculopathy, HLD, who presents to the ED with  diarrhea and rectal bleeding.    #Rectal Bleeding  #Diarrhea  #Anemia  -Pt reports watery/bloody diarrhea starting Wednesday that has worsened in frequency and amount of blood.  He reports current NSAID use r/t shoulder pain.  -Labs on admission reveal acute anemia from baseline Hgb 14-16 to 6.7 associated with weakness/fatigue.  WBC 14 with ANC 9.7, BUN 25  -Pt with hx of diverticulitis but denies ABD pain  -CT A/P pending  -Dark loose stool noted in the ED bathroom  -Last EGD/CLN 2018 with esophageal stricture requiring dilation, esophagitis and diverticulosis  -Etiology of bleeding unclear with reported BRBPR, now marroon/black stools concerning for possible upper GIB in setting of NSAID use vs diverticular bleed with known severe diverticulosis vs less likely neoplasm.  Discussed risk/benefit of EGD/CLN with Pt who states understanding and is willing to proceed.    EGD consent: I have discussed the risks, benefits, and alternatives to upper endoscopy/enteroscopy with the patient/primary decision maker [who demonstrated understanding], including but not limited to the risks of bleeding, infection, pain, death, as well as the risks of anesthesia and perforation all leading to prolonged hospitalization, surgical intervention, or even death. I also specifically mentioned the miss rate of upper endoscopy of 5-10% in the best of all circumstances.  The patient has agreed to sign an informed consent and elected to proceed with procedure with possible intervention [i.e. polypectomy, possible dilation, stent placement, etc.] as indicated.    Colonoscopy consent: I have discussed the risks, benefits, and alternatives to colonoscopy with the patient/primary decision maker [who demonstrated understanding], including but not limited to the risks of bleeding, infection, pain, death, as well as the risks of anesthesia and perforation all leading to prolonged hospitalization, surgical intervention, or even death. I also  specifically mentioned the miss rate of colonoscopy of 5-10% in the best of all circumstances. The patient has agreed to sign an informed consent and elected to proceed with colonoscopy with possible intervention [i.e. polypectomy, stent placement, etc.] as indicated.     Recommend:  -1 unit PRBCs  -Ok for clear liquids from GI stance  -EGD/colonoscopy tomorrow  -2L Golytely 1800, 2L at 0300  -IV PPI BID  -Trend Hgb, transfuse to >7  -Monitor for overt GIB  -Stool studies    Thank you for the opportunity to participate in the care of this patient.    Case discussed with Shari Lopez MD.    Mariaelena Cole DNP, FNP-BC  [facility] Gastroenter[facility]  5/10/2024

## 2024-05-10 NOTE — ED QUICK NOTES
Orders for admission, patient is aware of plan and ready to go upstairs. Any questions, please call ED BETH Blank at extension [phone].     Patient Covid vaccination status: Fully vaccinated     COVID Test Ordered in ED: None    COVID Suspicion at Admission: N/A    Running Infusions:      Mental Status/LOC at time of transport: Alert    Other pertinent information:   CIWA score: N/A   NIH score:  N/A      Patient is A&Ox3, from home with wife. Ambulatory, independent.

## 2024-05-10 NOTE — ED INITIAL ASSESSMENT (HPI)
Patient reports rectal bleeding and loose stool x3 days, alternating between dark and bright red. +nausea. Denies focal abd pain. No blood thinners

## 2024-05-11 ENCOUNTER — ANESTHESIA (OUTPATIENT)
Dept: ENDOSCOPY | Facility: HOSPITAL | Age: 79
DRG: 378 | End: 2024-05-11
Payer: MEDICARE

## 2024-05-11 ENCOUNTER — ANESTHESIA EVENT (OUTPATIENT)
Dept: ENDOSCOPY | Facility: HOSPITAL | Age: 79
DRG: 378 | End: 2024-05-11
Payer: MEDICARE

## 2024-05-11 LAB
ANION GAP SERPL CALC-SCNC: 4 MMOL/L (ref 0–18)
BASOPHILS # BLD AUTO: 0.04 X10(3) UL (ref 0–0.2)
BASOPHILS # BLD AUTO: 0.05 X10(3) UL (ref 0–0.2)
BASOPHILS # BLD AUTO: 0.06 X10(3) UL (ref 0–0.2)
BASOPHILS NFR BLD AUTO: 0.2 %
BASOPHILS NFR BLD AUTO: 0.4 %
BASOPHILS NFR BLD AUTO: 0.6 %
BLOOD TYPE BARCODE: 5100
BUN BLD-MCNC: 14 MG/DL (ref 9–23)
BUN/CREAT SERPL: 17.7 (ref 10–20)
CALCIUM BLD-MCNC: 7.6 MG/DL (ref 8.7–10.4)
CHLORIDE SERPL-SCNC: 111 MMOL/L (ref 98–112)
CO2 SERPL-SCNC: 28 MMOL/L (ref 21–32)
CREAT BLD-MCNC: 0.79 MG/DL
DEPRECATED RDW RBC AUTO: 45.8 FL (ref 35.1–46.3)
DEPRECATED RDW RBC AUTO: 46.6 FL (ref 35.1–46.3)
DEPRECATED RDW RBC AUTO: 46.6 FL (ref 35.1–46.3)
EGFRCR SERPLBLD CKD-EPI 2021: 92 ML/MIN/1.73M2 (ref 60–?)
EOSINOPHIL # BLD AUTO: 0.14 X10(3) UL (ref 0–0.7)
EOSINOPHIL # BLD AUTO: 0.86 X10(3) UL (ref 0–0.7)
EOSINOPHIL # BLD AUTO: 0.91 X10(3) UL (ref 0–0.7)
EOSINOPHIL NFR BLD AUTO: 0.6 %
EOSINOPHIL NFR BLD AUTO: 9.2 %
EOSINOPHIL NFR BLD AUTO: 9.2 %
ERYTHROCYTE [DISTWIDTH] IN BLOOD BY AUTOMATED COUNT: 14.9 % (ref 11–15)
ERYTHROCYTE [DISTWIDTH] IN BLOOD BY AUTOMATED COUNT: 14.9 % (ref 11–15)
ERYTHROCYTE [DISTWIDTH] IN BLOOD BY AUTOMATED COUNT: 15.3 % (ref 11–15)
GLUCOSE BLD-MCNC: 111 MG/DL (ref 70–99)
HCT VFR BLD AUTO: 20.2 %
HCT VFR BLD AUTO: 22 %
HCT VFR BLD AUTO: 25.2 %
HGB BLD-MCNC: 6.6 G/DL
HGB BLD-MCNC: 7.4 G/DL
HGB BLD-MCNC: 8.5 G/DL
IMM GRANULOCYTES # BLD AUTO: 0.06 X10(3) UL (ref 0–1)
IMM GRANULOCYTES # BLD AUTO: 0.07 X10(3) UL (ref 0–1)
IMM GRANULOCYTES # BLD AUTO: 0.14 X10(3) UL (ref 0–1)
IMM GRANULOCYTES NFR BLD: 0.6 %
IMM GRANULOCYTES NFR BLD: 0.6 %
IMM GRANULOCYTES NFR BLD: 0.8 %
LYMPHOCYTES # BLD AUTO: 0.94 X10(3) UL (ref 1–4)
LYMPHOCYTES # BLD AUTO: 1.7 X10(3) UL (ref 1–4)
LYMPHOCYTES # BLD AUTO: 1.97 X10(3) UL (ref 1–4)
LYMPHOCYTES NFR BLD AUTO: 17.2 %
LYMPHOCYTES NFR BLD AUTO: 21.1 %
LYMPHOCYTES NFR BLD AUTO: 3.9 %
MCH RBC QN AUTO: 28.2 PG (ref 26–34)
MCH RBC QN AUTO: 29 PG (ref 26–34)
MCH RBC QN AUTO: 29.1 PG (ref 26–34)
MCHC RBC AUTO-ENTMCNC: 32.7 G/DL (ref 31–37)
MCHC RBC AUTO-ENTMCNC: 33.6 G/DL (ref 31–37)
MCHC RBC AUTO-ENTMCNC: 33.7 G/DL (ref 31–37)
MCV RBC AUTO: 86 FL
MCV RBC AUTO: 86.3 FL
MCV RBC AUTO: 86.6 FL
MONOCYTES # BLD AUTO: 0.54 X10(3) UL (ref 0.1–1)
MONOCYTES # BLD AUTO: 0.58 X10(3) UL (ref 0.1–1)
MONOCYTES # BLD AUTO: 1.07 X10(3) UL (ref 0.1–1)
MONOCYTES NFR BLD AUTO: 4.5 %
MONOCYTES NFR BLD AUTO: 5.5 %
MONOCYTES NFR BLD AUTO: 6.2 %
NEUTROPHILS # BLD AUTO: 21.61 X10 (3) UL (ref 1.5–7.7)
NEUTROPHILS # BLD AUTO: 21.61 X10(3) UL (ref 1.5–7.7)
NEUTROPHILS # BLD AUTO: 5.81 X10 (3) UL (ref 1.5–7.7)
NEUTROPHILS # BLD AUTO: 5.81 X10(3) UL (ref 1.5–7.7)
NEUTROPHILS # BLD AUTO: 6.59 X10 (3) UL (ref 1.5–7.7)
NEUTROPHILS # BLD AUTO: 6.59 X10(3) UL (ref 1.5–7.7)
NEUTROPHILS NFR BLD AUTO: 62.3 %
NEUTROPHILS NFR BLD AUTO: 66.9 %
NEUTROPHILS NFR BLD AUTO: 90.2 %
OSMOLALITY SERPL CALC.SUM OF ELEC: 297 MOSM/KG (ref 275–295)
PLATELET # BLD AUTO: 219 10(3)UL (ref 150–450)
PLATELET # BLD AUTO: 247 10(3)UL (ref 150–450)
PLATELET # BLD AUTO: 267 10(3)UL (ref 150–450)
POTASSIUM SERPL-SCNC: 3.3 MMOL/L (ref 3.5–5.1)
RBC # BLD AUTO: 2.34 X10(6)UL
RBC # BLD AUTO: 2.54 X10(6)UL
RBC # BLD AUTO: 2.93 X10(6)UL
SODIUM SERPL-SCNC: 143 MMOL/L (ref 136–145)
UNIT VOLUME: 350 ML
WBC # BLD AUTO: 24 X10(3) UL (ref 4–11)
WBC # BLD AUTO: 9.3 X10(3) UL (ref 4–11)
WBC # BLD AUTO: 9.9 X10(3) UL (ref 4–11)

## 2024-05-11 PROCEDURE — 0DJ08ZZ INSPECTION OF UPPER INTESTINAL TRACT, VIA NATURAL OR ARTIFICIAL OPENING ENDOSCOPIC: ICD-10-PCS | Performed by: INTERNAL MEDICINE

## 2024-05-11 PROCEDURE — 43235 EGD DIAGNOSTIC BRUSH WASH: CPT | Performed by: INTERNAL MEDICINE

## 2024-05-11 PROCEDURE — 99233 SBSQ HOSP IP/OBS HIGH 50: CPT | Performed by: HOSPITALIST

## 2024-05-11 PROCEDURE — 0DJD8ZZ INSPECTION OF LOWER INTESTINAL TRACT, VIA NATURAL OR ARTIFICIAL OPENING ENDOSCOPIC: ICD-10-PCS | Performed by: INTERNAL MEDICINE

## 2024-05-11 PROCEDURE — 45378 DIAGNOSTIC COLONOSCOPY: CPT | Performed by: INTERNAL MEDICINE

## 2024-05-11 RX ORDER — MORPHINE SULFATE 10 MG/ML
6 INJECTION, SOLUTION INTRAMUSCULAR; INTRAVENOUS EVERY 10 MIN PRN
Status: DISCONTINUED | OUTPATIENT
Start: 2024-05-11 | End: 2024-05-11 | Stop reason: HOSPADM

## 2024-05-11 RX ORDER — FUROSEMIDE 10 MG/ML
20 INJECTION INTRAMUSCULAR; INTRAVENOUS ONCE
Status: COMPLETED | OUTPATIENT
Start: 2024-05-11 | End: 2024-05-11

## 2024-05-11 RX ORDER — SODIUM CHLORIDE, SODIUM LACTATE, POTASSIUM CHLORIDE, CALCIUM CHLORIDE 600; 310; 30; 20 MG/100ML; MG/100ML; MG/100ML; MG/100ML
INJECTION, SOLUTION INTRAVENOUS CONTINUOUS
Status: DISCONTINUED | OUTPATIENT
Start: 2024-05-11 | End: 2024-05-11

## 2024-05-11 RX ORDER — MORPHINE SULFATE 4 MG/ML
4 INJECTION, SOLUTION INTRAMUSCULAR; INTRAVENOUS EVERY 10 MIN PRN
Status: DISCONTINUED | OUTPATIENT
Start: 2024-05-11 | End: 2024-05-11 | Stop reason: HOSPADM

## 2024-05-11 RX ORDER — MORPHINE SULFATE 4 MG/ML
2 INJECTION, SOLUTION INTRAMUSCULAR; INTRAVENOUS EVERY 10 MIN PRN
Status: DISCONTINUED | OUTPATIENT
Start: 2024-05-11 | End: 2024-05-11 | Stop reason: HOSPADM

## 2024-05-11 RX ORDER — EPHEDRINE SULFATE 50 MG/ML
INJECTION INTRAVENOUS AS NEEDED
Status: DISCONTINUED | OUTPATIENT
Start: 2024-05-11 | End: 2024-05-11 | Stop reason: SURG

## 2024-05-11 RX ORDER — HYDROMORPHONE HYDROCHLORIDE 1 MG/ML
0.2 INJECTION, SOLUTION INTRAMUSCULAR; INTRAVENOUS; SUBCUTANEOUS EVERY 5 MIN PRN
Status: DISCONTINUED | OUTPATIENT
Start: 2024-05-11 | End: 2024-05-11 | Stop reason: HOSPADM

## 2024-05-11 RX ORDER — PANTOPRAZOLE SODIUM 40 MG/1
40 TABLET, DELAYED RELEASE ORAL
Status: DISCONTINUED | OUTPATIENT
Start: 2024-05-12 | End: 2024-05-12

## 2024-05-11 RX ORDER — HYDROMORPHONE HYDROCHLORIDE 1 MG/ML
0.4 INJECTION, SOLUTION INTRAMUSCULAR; INTRAVENOUS; SUBCUTANEOUS EVERY 5 MIN PRN
Status: DISCONTINUED | OUTPATIENT
Start: 2024-05-11 | End: 2024-05-11 | Stop reason: HOSPADM

## 2024-05-11 RX ORDER — NALOXONE HYDROCHLORIDE 0.4 MG/ML
80 INJECTION, SOLUTION INTRAMUSCULAR; INTRAVENOUS; SUBCUTANEOUS AS NEEDED
Status: DISCONTINUED | OUTPATIENT
Start: 2024-05-11 | End: 2024-05-11 | Stop reason: HOSPADM

## 2024-05-11 RX ORDER — POTASSIUM CHLORIDE 20 MEQ/1
40 TABLET, EXTENDED RELEASE ORAL ONCE
Status: COMPLETED | OUTPATIENT
Start: 2024-05-11 | End: 2024-05-11

## 2024-05-11 RX ORDER — MAGNESIUM HYDROXIDE/ALUMINUM HYDROXICE/SIMETHICONE 120; 1200; 1200 MG/30ML; MG/30ML; MG/30ML
30 SUSPENSION ORAL DAILY PRN
Status: DISCONTINUED | OUTPATIENT
Start: 2024-05-11 | End: 2024-05-12

## 2024-05-11 RX ORDER — SODIUM CHLORIDE, SODIUM LACTATE, POTASSIUM CHLORIDE, CALCIUM CHLORIDE 600; 310; 30; 20 MG/100ML; MG/100ML; MG/100ML; MG/100ML
INJECTION, SOLUTION INTRAVENOUS CONTINUOUS PRN
Status: DISCONTINUED | OUTPATIENT
Start: 2024-05-11 | End: 2024-05-11 | Stop reason: SURG

## 2024-05-11 RX ORDER — SODIUM CHLORIDE 9 MG/ML
INJECTION, SOLUTION INTRAVENOUS ONCE
Status: COMPLETED | OUTPATIENT
Start: 2024-05-11 | End: 2024-05-11

## 2024-05-11 RX ORDER — HYDROMORPHONE HYDROCHLORIDE 1 MG/ML
0.6 INJECTION, SOLUTION INTRAMUSCULAR; INTRAVENOUS; SUBCUTANEOUS EVERY 5 MIN PRN
Status: DISCONTINUED | OUTPATIENT
Start: 2024-05-11 | End: 2024-05-11 | Stop reason: HOSPADM

## 2024-05-11 RX ADMIN — EPHEDRINE SULFATE 5 MG: 50 INJECTION INTRAVENOUS at 09:58:00

## 2024-05-11 RX ADMIN — EPHEDRINE SULFATE 5 MG: 50 INJECTION INTRAVENOUS at 10:08:00

## 2024-05-11 RX ADMIN — SODIUM CHLORIDE, SODIUM LACTATE, POTASSIUM CHLORIDE, CALCIUM CHLORIDE: 600; 310; 30; 20 INJECTION, SOLUTION INTRAVENOUS at 09:30:00

## 2024-05-11 NOTE — PROGRESS NOTES
Received report from gurpreet to resume recovery care at 1119. Patient VSS. Waiting for transport back up to 552.

## 2024-05-11 NOTE — ANESTHESIA POSTPROCEDURE EVALUATION
Patient: Constantin Tabor Jr.    Procedure Summary       Date: 05/11/24 Room / Location: [facility] ENDOSCOPY 04 / [facility] ENDOSCOPY    Anesthesia Start: 0946 Anesthesia Stop:     Procedures:       ESOPHAGOGASTRODUODENOSCOPY (EGD)      COLONOSCOPY Diagnosis: (diverticulosis, hemorrhoids, blood throughout colon, paraesophageal hernia, hiatal hernia, esophageal ulcer)    Surgeons: Shari Preciado MD Anesthesiologist: Maricarmen Pan MD    Anesthesia Type: MAC ASA Status: 2 - Emergent            Anesthesia Type: MAC    Vitals Value Taken Time   BP 83/48 05/11/24 1038   Temp 98 °F (36.7 °C) 05/11/24 1038   Pulse 67 05/11/24 1038   Resp 15 05/11/24 1038   SpO2 93 % 05/11/24 [address] [place] [address] AN Post Evaluation:   Patient Evaluated in PACU  Patient Participation: complete - patient participated  Level of Consciousness: awake and alert  Pain Score: 0  Pain Management: adequate  Airway Patency:patent  Dental exam unchanged from preop  Yes    Nausea/Vomiting: none  Cardiovascular Status: hypotensive  Respiratory Status: nasal cannula  Postoperative Hydration hypovolemic  Comments: Service is aware of hypovolumic status/ hypotemtion      MARICARMEN PAN MD  5/11/2024 10:38 AM

## 2024-05-11 NOTE — PLAN OF CARE
Constantin is A&Ox4. RA. Had a colonoscopy/endoscopy today. Tolerating clear liquids well. Continent of both. BM bright red blood pre procedure. No BM post. Call light within reach and belongings at bedside.  Problem: Patient Centered Care  Goal: Patient preferences are identified and integrated in the patient's plan of care  Description: Interventions:  - What would you like us to know as we care for you? From home with wife  - Provide timely, complete, and accurate information to patient/family  - Incorporate patient and family knowledge, values, beliefs, and cultural backgrounds into the planning and delivery of care  - Encourage patient/family to participate in care and decision-making at the level they choose  - Honor patient and family perspectives and choices  Outcome: Progressing     Problem: CARDIOVASCULAR - ADULT  Goal: Maintains optimal cardiac output and hemodynamic stability  Description: INTERVENTIONS:  - Monitor vital signs, rhythm, and trends  - Monitor for bleeding, hypotension and signs of decreased cardiac output  - Evaluate effectiveness of vasoactive medications to optimize hemodynamic stability  - Monitor arterial and/or venous puncture sites for bleeding and/or hematoma  - Assess quality of pulses, skin color and temperature  - Assess for signs of decreased coronary artery perfusion - ex. Angina  - Evaluate fluid balance, assess for edema, trend weights  Outcome: Progressing     Problem: GASTROINTESTINAL - ADULT  Goal: Maintains or returns to baseline bowel function  Description: INTERVENTIONS:  - Assess bowel function  - Maintain adequate hydration with IV or PO as ordered and tolerated  - Evaluate effectiveness of GI medications  - Encourage mobilization and activity  - Obtain nutritional consult as needed  - Establish a toileting routine/schedule  - Consider collaborating with pharmacy to review patient's medication profile  Outcome: Progressing     Problem: METABOLIC/FLUID AND ELECTROLYTES -  ADULT  Goal: Hemodynamic stability and optimal renal function maintained  Description: INTERVENTIONS:  - Monitor labs and assess for signs and symptoms of volume excess or deficit  - Monitor intake, output and patient weight  - Monitor urine specific gravity, serum osmolarity and serum sodium as indicated or ordered  - Monitor response to interventions for patient's volume status, including labs, urine output, blood pressure (other measures as available)  - Encourage oral intake as appropriate  - Instruct patient on fluid and nutrition restrictions as appropriate  Outcome: Progressing

## 2024-05-11 NOTE — PROGRESS NOTES
[facility]  part of [facility]    Progress Note    Constantin Tabor Jr. Patient Status:  Inpatient    1945 MRN [id number]   Location [facility] 5SW/SE Attending Royce Aranda MD   Hosp Day # 1 PCP Fabricio Morales MD       Subjective:     No abd pain.  No BM here.  Denies SOB.    Objective:   Blood pressure 114/53, pulse 60, temperature 97.6 °F (36.4 °C), temperature source Oral, resp. rate 18, height 5' 10\" (1.778 m), weight 169 lb 12.8 oz (77 kg), SpO2 91%.    Gen:   NAD.  A and O x 3  CV:   RRR, no m/g/r  Pulm:   left basilar crackles, CTA bilat  Abd:   +bs, soft, NT, ND  LE:   No c/c/e  Neuro:   nonfocal    Results:     Lab Results   Component Value Date    WBC 9.3 2024    HGB 7.4 (L) 2024    HCT 22.0 (L) 2024    .0 2024    CREATSERUM 0.79 2024    BUN 14 2024     2024    K 3.3 (L) 2024     2024    CO2 28.0 2024     (H) 2024    CA 7.6 (L) 2024    ALB 3.7 2023    ALKPHO 143 (H) 2023    BILT 0.7 2023    TP 6.8 2023    AST 14 (L) 2023    ALT 26 2023    INR 1.07 05/10/2024    T4F 0.88 10/24/2012    TSH 3.620 2023    LIP 34 2023    PSA 1.46 2023    B12 307 12/10/2019       CT ABDOMEN+PELVIS(CONTRAST ONLY)(CPT=74177)    Result Date: 5/10/2024  CONCLUSION:  1. Colonic diverticulosis.  There is mild diffuse pericolonic inflammatory stranding, which raises suspicion for infectious/inflammatory colitis.  No convincing CT manifestations of acute diverticulitis (pericolonic inflammation is not centered along diverticular disease).  No definite abnormal extravasation of contrast into the gastrointestinal tract to suggest an active source of hemorrhage by CT. 2. Stable moderate retrocardiac hiatal hernia. 3. Sequelae of remote granulomatous disease. 4. Lesser incidental findings as above.   elm-remote  Dictated by (CST): Joel Car MD on  5/10/2024 at 1:06 PM     Finalized by (CST): Joel Car MD on 5/10/2024 at 1:15 PM               Assessment and Plan:     Melena  GIB  GIB possibly related to NSAID use.  Posthemorrhagic anemia.   Probable diverticular bleed.  - GI on consult  - EGD and colonoscopy done showing evidence of probable diverticular bleed  - cont protonix  - follow hgb Q8 hrs  - transfuse prn for hgb <7  - CLD for now  - stop NSAIDs    Hypoxia  Currently on 2L o2  - stop IVF  - wean o2  - incentive spirometer    Hx of HTN  - hold norvasc    dvt proph:    SCDs    Code status:    Full       MDM:    High Royce Cohen MD  5/11/2024

## 2024-05-11 NOTE — PLAN OF CARE
Aox4. Room air. Self. IVP protonix. IV fluids. Bowel prepped for EGD and colonoscopy this AM. Notified GI MD that patient is having marli blood BMs, instructed to continue bowel prep.     Problem: GASTROINTESTINAL - ADULT  Goal: Maintains or returns to baseline bowel function  Description: INTERVENTIONS:  - Assess bowel function  - Maintain adequate hydration with IV or PO as ordered and tolerated  - Evaluate effectiveness of GI medications  - Encourage mobilization and activity  - Obtain nutritional consult as needed  - Establish a toileting routine/schedule  - Consider collaborating with pharmacy to review patient's medication profile  Outcome: Progressing

## 2024-05-11 NOTE — OPERATIVE REPORT
ESOPHAGOGASTRODUODENOSCOPY (EGD) & COLONOSCOPY REPORT    Constantin Tabor Jr.     1945 Age 78 year old   PCP Fabricio Morales MD Endoscopist Shari Preciado MD       Date of procedure: 24    Procedure: EGD & Colonoscopy    Pre-operative diagnosis: rectal bleeding, acute blood loss anemia     Post-operative diagnosis: old blood throughout the colon, diverticulosis, hiatal hernia and possible paraesophageal hernia, bile acid reflux     Medications: MAC    Withdrawal time: 21 minutes    Complications: none    Procedure: Informed consent was obtained from the patient after the risks of the procedure were discussed, including but not limited to bleeding, perforation, aspiration, infection, or possibility of a missed lesion. We discussed the risks/benefits and alternatives to this procedure, as well as the planned sedation. EGD procedure: The patient was placed in the left lateral decubitus position and begun on continuous blood pressure pulse oximetry and EKG monitoring and this was maintained throughout the procedure. Once an adequate level of sedation was obtained a bite block was placed. Then the lubricated tip of the [id number] diagnostic video upper endoscope was inserted and advanced using direct visualization into the posterior pharynx and ultimately into the esophagus with  distal extent of the second portion of the duodenum.     Colonoscopy procedure: Once an adequate level of sedation was obtained a digital rectal exam was completed. Then the lubricated tip of the [id number] diagnostic video colonoscope was inserted and advanced without difficulty to the cecum using the CO2 insufflation technique. The cecum was identified by localizing the trifold, the appendix and the ileocecal valve. A routine second examination of the cecum/ascending colon was performed. Withdrawal was begun with thorough washing and careful examination of the colonic walls and folds. Photodocumentation was obtained.  The bowel prep was poor due to retained blood. Views of the colon were fair with washing. I then carefully withdrew the instrument from the patient who tolerated the procedure well.     Complications: None    EGD findings:      1. Esophagus: The squamocolumnar junction was noted at 34 cm and appeared regular. The diaphragmatic pinch was noted noted at 41 cm from the incisors. A 7 cm hiatal hernia was present. The esophagus appeared mildly dilated throughout. There was a small linear erosion in the distal esophagus. There was bile acid reflux noted in the hernia sac and throughout the esophagus.   2. Stomach: The stomach distended normally. Normal rugal folds were seen. The pylorus was patent. Retroflexion revealed a possible paraesophageal hernia and hill grade IV gastroesophageal junction. The gastric mucosa appeared normal. There was bile acid reflux throughout the stomach.   3. Duodenum: The duodenal mucosa appeared normal in the bulb and 2nd portion of the duodenum. There was bile present in the duodenum.     EGD Impression:  -Esophagus: Large hiatal hernia measuring 7 cm in size. Mild dilatation to the entire esophagus. Small linear erosion in the distal esophagus. This is not the source of patient's bleeding. Bile acid reflux.   -Stomach: Possible paraesophageal hernia seen on retroflexion; otherwise normal. Bile acid reflux.   -Duodenum: Normal.   -No sources of bleeding on upper endoscopy.     Colonoscopy findings:    1. Old blood and clots noted throughout the entire colon. The colon was irrigated with no findings of active bleeding. There was severe pan-diverticulosis with multiple diverticula impacted with stool.   2. No polyps seen but prep was poor due to retained blood.   3. Ileocecal valve appeared normal. Terminal ileum was unable to be intubated due to significant looping.   4. Visualization of the colonic mucosa was limited due to retained blood and clots but what was seen of the mucosa appeared to  have normal vascular pattern, without evidence of angioectasias or inflammation.   5. Retroflexion was deferred due to large rectal diverticula. Careful forward withdrawal of the colonoscope through the anal canal revealed small internal hemorrhoids.  6. FERMÍN: normal rectal tone, no masses palpated.     Colonoscopy Impression:  Presumed diverticular bleed with significant amount of old blood and clots throughout the entire colon.   Small internal hemorrhoids.     Recommend:  Continue to monitor Hb q8h. We expect pt to continue passing old blood in his stool. If he becomes hemodynamically unstable and/or has significant drop in Hb, would recommend STAT CTA with IR consult.   Daily PO PPI.   OK for CLD today.     >>>If tissue was obtained and you have not received your pathology results either by phone or letter within 2 weeks, please call our office at [phone].    Specimens: none    Blood loss: <1 ml

## 2024-05-11 NOTE — INTERVAL H&P NOTE
Pre-op Diagnosis: melena    The above referenced H&P was reviewed by Shari Preciado MD on 5/11/2024, the patient was examined and no significant changes have occurred in the patient's condition since the H&P was performed.  I discussed with the patient and/or legal representative the potential benefits, risks and side effects of this procedure; the likelihood of the patient achieving goals; and potential problems that might occur during recuperation.  I discussed reasonable alternatives to the procedure, including risks, benefits and side effects related to the alternatives and risks related to not receiving this procedure.  We will proceed with procedure as planned.

## 2024-05-11 NOTE — PLAN OF CARE
Pt admitted from ED for suspected GIB, hgb 6.7. 1 unit PRBC transfused per orders, redraw 7.3. Pt to complete 4L golytely overnight in prep for egd/cscope tomorrow. Pt ambulatory in the room, encouraged to call for assistance before getting up if feeling dizzy. Report endorsed to night RN.     Problem: Patient Centered Care  Goal: Patient preferences are identified and integrated in the patient's plan of care  Description: Interventions:  - What would you like us to know as we care for you?   - Provide timely, complete, and accurate information to patient/family  - Incorporate patient and family knowledge, values, beliefs, and cultural backgrounds into the planning and delivery of care  - Encourage patient/family to participate in care and decision-making at the level they choose  - Honor patient and family perspectives and choices  Outcome: Progressing     Problem: Patient/Family Goals  Goal: Patient/Family Long Term Goal  Description: Patient's Long Term Goal:     Interventions:  -   - See additional Care Plan goals for specific interventions  Outcome: Progressing  Goal: Patient/Family Short Term Goal  Description: Patient's Short Term Goal:     Interventions:   -   - See additional Care Plan goals for specific interventions  Outcome: Progressing     Problem: CARDIOVASCULAR - ADULT  Goal: Maintains optimal cardiac output and hemodynamic stability  Description: INTERVENTIONS:  - Monitor vital signs, rhythm, and trends  - Monitor for bleeding, hypotension and signs of decreased cardiac output  - Evaluate effectiveness of vasoactive medications to optimize hemodynamic stability  - Monitor arterial and/or venous puncture sites for bleeding and/or hematoma  - Assess quality of pulses, skin color and temperature  - Assess for signs of decreased coronary artery perfusion - ex. Angina  - Evaluate fluid balance, assess for edema, trend weights  Outcome: Progressing     Problem: GASTROINTESTINAL - ADULT  Goal: Maintains or  returns to baseline bowel function  Description: INTERVENTIONS:  - Assess bowel function  - Maintain adequate hydration with IV or PO as ordered and tolerated  - Evaluate effectiveness of GI medications  - Encourage mobilization and activity  - Obtain nutritional consult as needed  - Establish a toileting routine/schedule  - Consider collaborating with pharmacy to review patient's medication profile  Outcome: Progressing     Problem: METABOLIC/FLUID AND ELECTROLYTES - ADULT  Goal: Hemodynamic stability and optimal renal function maintained  Description: INTERVENTIONS:  - Monitor labs and assess for signs and symptoms of volume excess or deficit  - Monitor intake, output and patient weight  - Monitor urine specific gravity, serum osmolarity and serum sodium as indicated or ordered  - Monitor response to interventions for patient's volume status, including labs, urine output, blood pressure (other measures as available)  - Encourage oral intake as appropriate  - Instruct patient on fluid and nutrition restrictions as appropriate  Outcome: Progressing

## 2024-05-11 NOTE — ANESTHESIA PREPROCEDURE EVALUATION
Anesthesia PreOp Note    HPI:     Constantin Tabor Jr. is a 78 year old male who presents for preoperative consultation requested by: Shari Preciado MD    Date of Surgery: 5/10/2024 - 5/11/2024    Procedure(s):  ESOPHAGOGASTRODUODENOSCOPY (EGD)  COLONOSCOPY  Indication: melena    Relevant Problems   No relevant active problems       NPO:  Last Liquid Consumption Date: 05/11/24  Last Liquid Consumption Time: 0600  Last Solid Consumption Date: 05/08/24  Last Solid Consumption Time: 1900  Last Liquid Consumption Date: 05/11/24          History Review:  Patient Active Problem List    Diagnosis Date Noted    Gastrointestinal hemorrhage, unspecified gastrointestinal hemorrhage type 05/10/2024    GI bleed 05/10/2024    Microcytic anemia 06/05/2019    Basal cell carcinoma of chest 11/26/2018    Vitamin D deficiency 01/23/2018    Primary hypertension 03/03/2017    Gastritis without bleeding 03/03/2017    Psoriasis and similar disorder 06/04/2008       Past Medical History:    Actinic keratosis    Acute cervical radiculopathy    BCC (basal cell carcinoma of skin)    chest    BCC (basal cell carcinoma)    left nose    Esophageal reflux    Hearing impairment    Lipid screening    Mixed hyperlipidemia    Need for vaccination    Neoplasm of uncertain behavior of skin    Non-healing skin lesion of nose    Physical exam, annual    Psoriasis    Unspecified essential hypertension       Past Surgical History:   Procedure Laterality Date    Appendectomy  1959    Colonoscopy  10/2009    repeat in 2019    Colonoscopy N/A 7/3/2018    Procedure: COLONOSCOPY;  Surgeon: Alex Mcgee MD;  Location: [facility] ENDOSCOPY    Egd      Electrocardiogram, complete  02/17/2014    Scanned to media tab        Medications Prior to Admission   Medication Sig Dispense Refill Last Dose    amLODIPine 5 MG Oral Tab Take 1 tablet (5 mg total) by mouth every morning. 90 tablet 3 5/10/2024    pantoprazole 40 MG Oral Tab EC Take 1 tablet (40 mg  total) by mouth before breakfast. 90 tablet 3 5/10/2024     Current Facility-Administered Medications Ordered in Epic   Medication Dose Route Frequency Provider Last Rate Last Admin    sodium chloride 0.9% infusion   Intravenous Once Martin Obregon MD        furosemide (Lasix) 10 mg/mL injection 20 mg  20 mg Intravenous Once Martin Obregon MD        sodium chloride 0.9% infusion   Intravenous Continuous Lv Urban  mL/hr at 24 New Bag at 24    acetaminophen (Tylenol Extra Strength) tab 500 mg  500 mg Oral Q4H PRN Lv Urban MD        ondansetron (Zofran) 4 MG/2ML injection 4 mg  4 mg Intravenous Q6H PRN Lv Urban MD        metoclopramide (Reglan) 5 mg/mL injection 10 mg  10 mg Intravenous Q8H PRN Lv Urban MD        pantoprazole (Protonix) 40 mg in sodium chloride 0.9% PF 10 mL IV push  40 mg Intravenous Q12H Mariaelena Cole APRN   40 mg at 24     No current [facility]-ordered outpatient medications on file.       No Known Allergies    Family History   Problem Relation Age of Onset    Diabetes Mother     Cancer Father         lung-smoker    Diabetes Sister      Social History     Socioeconomic History    Marital status:    Tobacco Use    Smoking status: Former     Current packs/day: 0.00     Types: Cigarettes     Quit date: 1972     Years since quittin.3    Smokeless tobacco: Never   Substance and Sexual Activity    Alcohol use: Yes     Alcohol/week: 2.0 standard drinks of alcohol     Types: 1 Cans of beer, 1 Standard drinks or equivalent per week     Comment: few times a month    Drug use: No   Other Topics Concern    Caffeine Concern Yes     Comment: Daily; 2 cups, coffee    Reaction to local anesthetic No       Available pre-op labs reviewed.  Lab Results   Component Value Date    WBC 9.9 2024    RBC 2.34 (L) 2024    HGB 6.6 (LL) 2024    HCT 20.2 (L) 2024    MCV 86.3 2024    MCH 28.2  05/11/2024    MCHC 32.7 05/11/2024    RDW 15.3 (H) 05/11/2024    .0 05/11/2024     Lab Results   Component Value Date     05/10/2024    K 3.9 05/10/2024     05/10/2024    CO2 26.0 05/10/2024    BUN 25 (H) 05/10/2024    CREATSERUM 0.98 05/10/2024     (H) 05/10/2024    CA 8.4 (L) 05/10/2024     Lab Results   Component Value Date    INR 1.07 05/10/2024       Vital Signs:  Body mass index is 24.36 kg/m².   height is 1.778 m (5' 10\") and weight is 77 kg (169 lb 12.8 oz). His oral temperature is 98.1 °F (36.7 °C). His blood pressure is 118/61 and his pulse is 70. His respiration is 18 and oxygen saturation is 98%.   Vitals:    05/10/24 1900 05/10/24 2228 05/11/24 0332 05/11/24 0747   BP:  110/58 107/68 118/61   Pulse: 96 80 70    Resp:  16 16 18   Temp:  98.2 °F (36.8 °C) 98.2 °F (36.8 °C) 98.1 °F (36.7 °C)   TempSrc:  Oral Oral Oral   SpO2:  97% 98% 98%   Weight:       Height:            Anesthesia Evaluation     Patient summary reviewed and Nursing notes reviewed    Airway   Mallampati: II  Dental          Pulmonary - normal exam   Cardiovascular - normal exam  Exercise tolerance: good  (+) hypertension well controlled    NYHA Classification: I  ECG reviewed  ROS comment: Pt stated has good ex tolerance   Last visit to IM Dr Buenrostro for annual exam unremarkable     Neuro/Psych    (+)  neuromuscular disease,        GI/Hepatic/Renal    (+) GERD well controlled    Endo/Other      Comments: SSESSMENT AND PLAN:  1.       Melena/gastrointestinal bleed, most likely related to NSAID use.  Rule out peptic ulcer disease or _________  esophagitis.  2.       Profound posthemorrhagic anemia.        Abdominal  - normal exam     Other findings: HB / HT 6.6/ 18.0 , transfused 2 units            Anesthesia Plan:   ASA:  2  Emergent    Plan:   MAC      I have informed Constantin Tabor Jr. and/or legal guardian or family member of the nature of the anesthetic plan, benefits, risks including possible dental damage if  relevant, major complications, and any alternative forms of anesthetic management.   All of the patient's questions were answered to the best of my ability. The patient desires the anesthetic management as planned.  MARICARMEN PAN MD  5/11/2024 8:11 AM  Present on Admission:  **None**

## 2024-05-12 ENCOUNTER — TELEPHONE (OUTPATIENT)
Facility: CLINIC | Age: 79
End: 2024-05-12

## 2024-05-12 VITALS
WEIGHT: 169.81 LBS | HEIGHT: 70 IN | RESPIRATION RATE: 18 BRPM | HEART RATE: 92 BPM | SYSTOLIC BLOOD PRESSURE: 117 MMHG | OXYGEN SATURATION: 95 % | DIASTOLIC BLOOD PRESSURE: 55 MMHG | BODY MASS INDEX: 24.31 KG/M2 | TEMPERATURE: 98 F

## 2024-05-12 LAB
ANION GAP SERPL CALC-SCNC: 7 MMOL/L (ref 0–18)
BASOPHILS # BLD AUTO: 0.06 X10(3) UL (ref 0–0.2)
BASOPHILS # BLD AUTO: 0.08 X10(3) UL (ref 0–0.2)
BASOPHILS NFR BLD AUTO: 0.3 %
BASOPHILS NFR BLD AUTO: 0.4 %
BLOOD TYPE BARCODE: 5100
BUN BLD-MCNC: 12 MG/DL (ref 9–23)
BUN/CREAT SERPL: 12.2 (ref 10–20)
CALCIUM BLD-MCNC: 8.2 MG/DL (ref 8.7–10.4)
CHLORIDE SERPL-SCNC: 107 MMOL/L (ref 98–112)
CO2 SERPL-SCNC: 28 MMOL/L (ref 21–32)
CREAT BLD-MCNC: 0.98 MG/DL
DEPRECATED RDW RBC AUTO: 43.6 FL (ref 35.1–46.3)
DEPRECATED RDW RBC AUTO: 46.4 FL (ref 35.1–46.3)
EGFRCR SERPLBLD CKD-EPI 2021: 80 ML/MIN/1.73M2 (ref 60–?)
EOSINOPHIL # BLD AUTO: 0.08 X10(3) UL (ref 0–0.7)
EOSINOPHIL # BLD AUTO: 0.67 X10(3) UL (ref 0–0.7)
EOSINOPHIL NFR BLD AUTO: 0.3 %
EOSINOPHIL NFR BLD AUTO: 3.1 %
ERYTHROCYTE [DISTWIDTH] IN BLOOD BY AUTOMATED COUNT: 14.9 % (ref 11–15)
ERYTHROCYTE [DISTWIDTH] IN BLOOD BY AUTOMATED COUNT: 15.3 % (ref 11–15)
GLUCOSE BLD-MCNC: 114 MG/DL (ref 70–99)
HCT VFR BLD AUTO: 22.3 %
HCT VFR BLD AUTO: 25.2 %
HGB BLD-MCNC: 8 G/DL
HGB BLD-MCNC: 8.6 G/DL
IMM GRANULOCYTES # BLD AUTO: 0.13 X10(3) UL (ref 0–1)
IMM GRANULOCYTES # BLD AUTO: 0.14 X10(3) UL (ref 0–1)
IMM GRANULOCYTES NFR BLD: 0.6 %
IMM GRANULOCYTES NFR BLD: 0.6 %
LYMPHOCYTES # BLD AUTO: 1.57 X10(3) UL (ref 1–4)
LYMPHOCYTES # BLD AUTO: 1.69 X10(3) UL (ref 1–4)
LYMPHOCYTES NFR BLD AUTO: 6.6 %
LYMPHOCYTES NFR BLD AUTO: 7.8 %
MCH RBC QN AUTO: 29.5 PG (ref 26–34)
MCH RBC QN AUTO: 30 PG (ref 26–34)
MCHC RBC AUTO-ENTMCNC: 34.1 G/DL (ref 31–37)
MCHC RBC AUTO-ENTMCNC: 35.9 G/DL (ref 31–37)
MCV RBC AUTO: 83.5 FL
MCV RBC AUTO: 86.3 FL
MONOCYTES # BLD AUTO: 0.93 X10(3) UL (ref 0.1–1)
MONOCYTES # BLD AUTO: 1.03 X10(3) UL (ref 0.1–1)
MONOCYTES NFR BLD AUTO: 4.3 %
MONOCYTES NFR BLD AUTO: 4.3 %
NEUTROPHILS # BLD AUTO: 18.24 X10 (3) UL (ref 1.5–7.7)
NEUTROPHILS # BLD AUTO: 18.24 X10(3) UL (ref 1.5–7.7)
NEUTROPHILS # BLD AUTO: 20.89 X10 (3) UL (ref 1.5–7.7)
NEUTROPHILS # BLD AUTO: 20.89 X10(3) UL (ref 1.5–7.7)
NEUTROPHILS NFR BLD AUTO: 83.8 %
NEUTROPHILS NFR BLD AUTO: 87.9 %
OSMOLALITY SERPL CALC.SUM OF ELEC: 295 MOSM/KG (ref 275–295)
PLATELET # BLD AUTO: 260 10(3)UL (ref 150–450)
PLATELET # BLD AUTO: 325 10(3)UL (ref 150–450)
POTASSIUM SERPL-SCNC: 3.6 MMOL/L (ref 3.5–5.1)
POTASSIUM SERPL-SCNC: 3.6 MMOL/L (ref 3.5–5.1)
RBC # BLD AUTO: 2.67 X10(6)UL
RBC # BLD AUTO: 2.92 X10(6)UL
SODIUM SERPL-SCNC: 142 MMOL/L (ref 136–145)
UNIT VOLUME: 350 ML
WBC # BLD AUTO: 21.7 X10(3) UL (ref 4–11)
WBC # BLD AUTO: 23.8 X10(3) UL (ref 4–11)

## 2024-05-12 PROCEDURE — 99233 SBSQ HOSP IP/OBS HIGH 50: CPT | Performed by: INTERNAL MEDICINE

## 2024-05-12 NOTE — DISCHARGE SUMMARY
Dc summary#[id number]  AMA dc    Hospital Discharge Diagnoses:  gi bleed    Lace+ Score: 39  59-90 High Risk  29-58 Medium Risk  0-28   Low Risk.    TCM Follow-Up Recommendation:  LACE > 58: High Risk of readmission after discharge from the hospital.  Tcm fu recommended; ama dc

## 2024-05-12 NOTE — SIGNIFICANT EVENT
Patient left against medical advice despite repeated attempts to have patient wait for Hospitalist to discuss care and plan. Hospitalist made aware of patient's request to discharge this AM however MD was not comfortable discharging given medical circumstances. Patient ultimately was not willing to wait for MD.

## 2024-05-12 NOTE — PLAN OF CARE
Alert and oriented x4, Ambulating independently. Call light within reach, bed in lowest position.Monitoring vital signs- stable at this time. No acute changes noted throughout shift. Frequent rounding by nursing staff. Plan to monitor hemoglobin levels.    Problem: Patient Centered Care  Goal: Patient preferences are identified and integrated in the patient's plan of care  Description: Interventions:  - What would you like us to know as we care for you?   - Provide timely, complete, and accurate information to patient/family  - Incorporate patient and family knowledge, values, beliefs, and cultural backgrounds into the planning and delivery of care  - Encourage patient/family to participate in care and decision-making at the level they choose  - Honor patient and family perspectives and choices  Outcome: Progressing     Problem: Patient/Family Goals  Goal: Patient/Family Long Term Goal  Description: Patient's Long Term Goal: to go home     Interventions:  - Follow MD orders  - monitor labs and vitals   - update pt. on plan of care   - discharge planning   - See additional Care Plan goals for specific interventions  Outcome: Progressing  Goal: Patient/Family Short Term Goal  Description: Patient's Short Term Goal: to maintain hemoglobin levels     Interventions:   - blood administration  - lab monitoring   - See additional Care Plan goals for specific interventions  Outcome: Progressing     Problem: CARDIOVASCULAR - ADULT  Goal: Maintains optimal cardiac output and hemodynamic stability  Description: INTERVENTIONS:  - Monitor vital signs, rhythm, and trends  - Monitor for bleeding, hypotension and signs of decreased cardiac output  - Evaluate effectiveness of vasoactive medications to optimize hemodynamic stability  - Monitor arterial and/or venous puncture sites for bleeding and/or hematoma  - Assess quality of pulses, skin color and temperature  - Assess for signs of decreased coronary artery perfusion - ex.  Angina  - Evaluate fluid balance, assess for edema, trend weights  Outcome: Progressing     Problem: GASTROINTESTINAL - ADULT  Goal: Maintains or returns to baseline bowel function  Description: INTERVENTIONS:  - Assess bowel function  - Maintain adequate hydration with IV or PO as ordered and tolerated  - Evaluate effectiveness of GI medications  - Encourage mobilization and activity  - Obtain nutritional consult as needed  - Establish a toileting routine/schedule  - Consider collaborating with pharmacy to review patient's medication profile  Outcome: Progressing     Problem: METABOLIC/FLUID AND ELECTROLYTES - ADULT  Goal: Hemodynamic stability and optimal renal function maintained  Description: INTERVENTIONS:  - Monitor labs and assess for signs and symptoms of volume excess or deficit  - Monitor intake, output and patient weight  - Monitor urine specific gravity, serum osmolarity and serum sodium as indicated or ordered  - Monitor response to interventions for patient's volume status, including labs, urine output, blood pressure (other measures as available)  - Encourage oral intake as appropriate  - Instruct patient on fluid and nutrition restrictions as appropriate  Outcome: Progressing

## 2024-05-12 NOTE — PROGRESS NOTES
[facility]     Gastroenterology Progress Note    Constantin Tabor JrLinden Patient Status:  Inpatient    1945 MRN [id number]   Location [facility] 5SW/SE Attending No att. providers found   Hosp Day # 2 PCP Fabricio Morales MD       Subjective:   No further bleeding. Feels well and wants to eat and go home.       Objective:   Blood pressure 117/55, pulse 92, temperature 97.5 °F (36.4 °C), temperature source Oral, resp. rate 18, height 5' 10\" (1.778 m), weight 169 lb 12.8 oz (77 kg), SpO2 95%. Body mass index is 24.36 kg/m².    Gen- Patient appears comfortable and in no acute discomfort  HEENT: the sclera appears anicteric, mucus membranes appear moist  CV- regular rate   Lung- Moves air well; No labored breathing  Abdomen- soft, non-tender exam in all quadrants without rigidity or guarding, non-distended, no abnormal bowel sounds noted, no masses are palpated  Skin- No jaundice  Ext: no cyanosis, clubbing or edema is evident.   Neuro- Alert and interactive, and gross movements of extremities normal    Results:     Lab Results   Component Value Date    WBC 21.7 (H) 2024    HGB 8.6 (L) 2024    HCT 25.2 (L) 2024    .0 2024    CREATSERUM 0.98 2024    BUN 12 2024     2024    K 3.6 2024    K 3.6 2024     2024    CO2 28.0 2024     (H) 2024    CA 8.2 (L) 2024    ALB 3.7 2023    ALKPHO 143 (H) 2023    BILT 0.7 2023    TP 6.8 2023    AST 14 (L) 2023    ALT 26 2023    INR 1.07 05/10/2024    T4F 0.88 10/24/2012    TSH 3.620 2023    LIP 34 2023    PSA 1.46 2023    B12 307 12/10/2019       No results found.        Assessment and Plan:   Constantin Tabor . is a 78 year old male w/ PMHx of Diverticulosis c/b recurrent diverticulitis (2 previous episodes), GERD, esophagitis, hiatal hernia, esophageal stricture requiring dilation, actinic keratosis,  cervical radiculopathy, HLD, who presents to the ED with diarrhea and rectal bleeding.     #Rectal Bleeding  #Diarrhea  #Anemia  -Pt reports watery/bloody diarrhea starting Wednesday that has worsened in frequency and amount of blood.  He reports current NSAID use r/t shoulder pain.  -Labs on admission reveal acute anemia from baseline Hgb 14-16 to 6.7 associated with weakness/fatigue.  WBC 14 with ANC 9.7, BUN 25  -Pt with hx of diverticulitis but denies ABD pain  -CT A/P pending  -Dark loose stool noted in the ED bathroom  -Last EGD/CLN 2018 with esophageal stricture requiring dilation, esophagitis and diverticulosis  -Etiology of bleeding unclear with reported BRBPR, now marroon/black stools concerning for possible upper GIB in setting of NSAID use vs diverticular bleed with known severe diverticulosis vs less likely neoplasm.  Discussed risk/benefit of EGD/CLN with Pt who states understanding and is willing to proceed.      He underwent EGD/colonoscopy 5/11 with findings as below. Bleeding presumed to be 2/2 diverticular bleed, now resolved with no ongoing bleeding. He feels well and wants to eat.     EGD Impression:  -Esophagus: Large hiatal hernia measuring 7 cm in size. Mild dilatation to the entire esophagus. Small linear erosion in the distal esophagus. This is not the source of patient's bleeding. Bile acid reflux.   -Stomach: Possible paraesophageal hernia seen on retroflexion; otherwise normal. Bile acid reflux.   -Duodenum: Normal.   -No sources of bleeding on upper endoscopy.     Colonoscopy Impression:  Presumed diverticular bleed with significant amount of old blood and clots throughout the entire colon.   Small internal hemorrhoids.      He was noted after procedure to have an elevated WBC count - recommend workup per primary.     OK to ADAT. GI team will sign off at this time.     Shari Preciado MD

## 2024-05-13 NOTE — DISCHARGE SUMMARY
[facility]    PATIENT'S NAME: MEGHAN JOSUE JR.   ATTENDING PHYSICIAN: Martin Obregon MD   PATIENT ACCOUNT#:   [id number]    LOCATION:  [address] [place] [address]  MEDICAL RECORD #:   [id number]       YOB: 1945  ADMISSION DATE:       05/10/2024      DISCHARGE DATE:  05/12/2024    DISCHARGE SUMMARY  AMA discharge    Discharge Dx: lower gi iron deficiency anemia from likely diverticular blood loss complicated by neutrophilia of unknown cause      HISTORY AND HOSPITAL COURSE:  This is a 78-year-old male who presented with a history of melena, GI bleeding, and posthemorrhagic anemia.  He was transfused with blood yesterday as his hemoglobin had gone as low as 6.6.  He was seen by Dr. Cohen yesterday but I inherited the patient today because Dr. Cohen had too many patients.  I had planned to see the patient in the afternoon and perform workup because of his elevated white blood count that after the procedure had gone quite high to 24,000 and 12.7000, but I never really had the chance.  I got a call from the nurse that he demanded to see me right away or he is leaving against medical advice.  Unfortunately, I was involved in the care of an ICU patient and I could not come up when he wanted me to, and the patient left against medical advice.  I told the nurse that I would not have discharged him because I wanted to work up the white blood count.  I would have wanted to watch the hemoglobin 1 more day because of his age and frailty.    PHYSICAL EXAMINATION ON DISCHARGE:  Only from the chart.  His vital signs are 97.5, pulse 92, respiratory rate 18, blood pressure 117/95, 95%.  I was not able to do a physical exam.    ASSESSMENT AND PLAN:    1.   Probable diverticular lower GI blood loss anemia probably from diverticulosis with iron deficiency.  I would have wanted to watch the patient and see how he does.  2.   Hypoxia probably from atelectasis, reason unknown.  Needs investigation.  3.    Hypertension.  The patient is actually relatively hypotensive to his baseline, again concerning.  4, neutrophilia poss aspiration would have wanted cxr  CONDITION ON DISCHARGE:  Unstable, against medical advice.    CODE STATUS:  Per chart, full code.  I never saw or talked to tolerated the procedure well.    DISCHARGE MEDICATIONS:  There was no chance to reconcile or give him any medications on discharge.  Needs to see Dr. Morales.    DISCHARGE INSTRUCTIONS:  Activity and diet as tolerated.  Follow up with Dr. Morales as soon as possible.      RISK OF READMISSION:  Very high.  He is not stable to go home and will likely come back.  TCM followup is recommended.    Dictated By Martin Obregon MD  d: 05/12/2024 13:32:11  t: 05/12/2024 20:12:57  Job [id number]  PRITESH/

## 2024-05-13 NOTE — TELEPHONE ENCOUNTER
Spoke to patient  Name,  verified    Per Dr. Ilana moreno to follow up in 1 month    Offered patient first available and he was agreeable  Location, date and time confirmed with patient.    Your Appointments      2024 8:00 AM  Follow Up Visit with Alex Mcgee MD  [facility] [address] [address]  [phone]

## 2024-05-14 NOTE — PAYOR COMM NOTE
--------------  ADMISSION REVIEW     Payor: [employer]  Subscriber #:  [id number]  Authorization Number: [id number]    Admit date: 5/10/24  Admit time:  3:32 PM       REVIEW DOCUMENTATION:  ED Provider Notes signed by Chapis Lazo MD at 5/10/2024  1:34 PM    Patient Seen in: [facility]     EMERGENCY DEPARTMENT NOTE    Dictated. Voice Transcription software has been utilized for this dictation (the reader should be aware that typographical errors are possible with voice transcription software and to please contact the dictating physician if there are questions.)   History     Chief Complaint   Patient presents with    GI Bleeding       There may be discrepancies from triage note.     HPI    History provided by patient and patient's wife.  78-year-old male, history of diverticulosis and diverticulitis complaining of noting blood mixed in with the stool over the last 3 days.  No abdominal pain.  States that these are similar symptoms to when he has had diverticulitis.  Wife states that patient follows with our GI clinician, Dr. Iverson.  Wife states that patient has been having lightheadedness and feels generally weak over the last 3 days.  Patient denies bleeding from any orifice.  Denies anticoagulation/antiplatelet use    No fevers, chills, nausea, vomiting, diarrhea, constipation, cough, cold symptoms, urinary complaints.  No chest pain, shortness of breath  No headache, neck pain, neck stiffness, incontinence.  No changes in mentation, no changes in vision, no total/new extremity weakness, no total/new extremity paresthesia, no difficulty speaking.  No alleviating or exacerbating factors.  Denies orthopnea, pnd, change in exercise tolerance limited by chest pain/sob , lower extremity edema/asymmetry.       History reviewed.   Past Medical History:    Actinic keratosis    Acute cervical radiculopathy    BCC (basal cell carcinoma of skin)    chest    BCC (basal cell carcinoma)    left  nose    Esophageal reflux    Hearing impairment    Lipid screening    Mixed hyperlipidemia    Need for vaccination    Neoplasm of uncertain behavior of skin    Non-healing skin lesion of nose    Physical exam, annual    Psoriasis    Unspecified essential hypertension   Review of Systems   Constitutional: Negative.    HENT: Negative.     Eyes: Negative.    Respiratory: Negative.     Cardiovascular: Negative.    Gastrointestinal:  Positive for blood in stool. Negative for abdominal pain, constipation, diarrhea and melena.   Genitourinary: Negative.    Musculoskeletal: Negative.    Skin: Negative.    Neurological: Negative.    Endo/Heme/Allergies: Negative.    Psychiatric/Behavioral: Negative.     All other systems reviewed and are negative.    Pertinent positives as listed.  All other organ systems are reviewed and are negative.    Constitutional and vital signs reviewed.      Social History and Family History elements reviewed from today, pertinent positives to the presenting problem noted.    Physical Exam     ED Triage Vitals [05/10/24 1016]   /65   Pulse 84   Resp 16   Temp 98 °F (36.7 °C)   Temp src Temporal   SpO2 99 %   O2 Device None (Room air)       All measures to prevent infection transmission during my interaction with the patient were taken. The patient was already wearing a droplet mask on my arrival to the room. Personal protective equipment including droplet mask, eye protection, and gloves were worn throughout the duration of the exam.  Handwashing was performed prior to and after the exam.  Stethoscope and any equipment used during my examination was cleaned with super sani-cloth germicidal wipes following the exam.     Physical Exam  Vitals and nursing note reviewed.   Constitutional:       General: He is not in acute distress.     Appearance: He is not ill-appearing or toxic-appearing.      Comments: Smiles     Cardiovascular:      Rate and Rhythm: Normal rate and regular rhythm.       Comments: Dorsalis pedis pulses 2+ bilaterally    Pulmonary:      Effort: Pulmonary effort is normal. No respiratory distress.   Abdominal:      General: There is no distension.      Palpations: Abdomen is soft.      Tenderness: There is no abdominal tenderness. There is no guarding or rebound.      Comments: Negative Hernandez sign, negative McBurney's point tenderness     Genitourinary:     Comments: Red/black stool, heme +   Musculoskeletal:      Right lower leg: No edema.      Left lower leg: No edema.   Skin:     Capillary Refill: Capillary refill takes less than 2 seconds.      Coloration: Skin is not jaundiced or pale.      Findings: No bruising, erythema, lesion or rash.   Neurological:      General: No focal deficit present.      Mental Status: He is alert and oriented to person, place, and time.      Comments: Gross motor and sensory function intact symmetrically and bilaterally to upper extremities and lower extremities.   Psychiatric:         Mood and Affect: Mood normal.         Behavior: Behavior normal.     Review of prior notes in Care everywhere/Epic performed by myself:  Pt had upper and lower endoscopy 2018 and was found to have 2  ubular adenomas, colonic lipomata, extensive pancolonic diverticulosis, LA grade D esophagitis, and a hiatal hernia (5 cm) with a possible slight esophageal stricture.  The stricture was dilated and the patient was placed on pantoprazole at that time   -pt had ct 6/20293 revealing pan diverticulosis , fatty liver   -Hemoglobin September 20 23-15.  Today's hemoglobin 6.7, dramatic hemoglobin drop.    ED Course     If labs obtained, they are personally reviewed by myself:     Labs Reviewed   BASIC METABOLIC PANEL (8) - Abnormal; Notable for the following components:       Result Value    Glucose 135 (*)     BUN 25 (*)     BUN/CREA Ratio 25.5 (*)     Calcium, Total 8.4 (*)     Calculated Osmolality 302 (*)     All other components within normal limits   CBC W/ DIFFERENTIAL -  Abnormal; Notable for the following components:    WBC 14.1 (*)     RBC 2.40 (*)     HGB 6.7 (*)     HCT 20.9 (*)     RDW-SD 47.2 (*)     Neutrophil Absolute Prelim 9.71 (*)     Neutrophil Absolute 9.71 (*)     Eosinophil Absolute 1.17 (*)     All other components within normal limits   PROTHROMBIN TIME (PT) - Normal   C. DIFFICILE(TOXIGENIC)PCR - Normal   CBC WITH DIFFERENTIAL WITH PLATELET    Narrative:     The following orders were created for panel order CBC With Differential With Platelet.                  Procedure                               Abnormality         Status                                     ---------                               -----------         ------                                     CBC W/ DIFFERENTIAL[[id number]]          Abnormal            Final result                                                 Please view results for these tests on the individual orders.   SCAN SLIDE   MD BLOOD SMEAR CONSULT   TYPE AND SCREEN    Narrative:     The following orders were created for panel order Type and screen.                  Procedure                               Abnormality         Status                                     ---------                               -----------         ------                                     ABORH (Blood Type)[[id number]]                               Final result                               Antibody Screen[[id number]]                                  Final result                                                 Please view results for these tests on the individual orders.   ABORH (BLOOD TYPE)   ANTIBODY SCREEN   PREPARE RBC   GI STOOL PANEL BY PCR    Narrative:     The GI Panel tests for Campylobacter species jejuni, coli, and                   upsaliensis; all species, subspecies, and serovars of Salmonella;                   and Vibrio species parahaemolyticus, vulnificus, and cholera.                   It does NOT test for Aeromonas or Edwardsiella  species.                         If radiologic studies ordered during today's ER visit, my independent interpretation are seen directly below.  This is awaiting the radiologist's final interpretation.  CT abdomen/pelvis, independent interpretation completed by myself, awaiting final radiology interpretation: No perforation      Imaging Results read by radiology in ED: CT ABDOMEN+PELVIS(CONTRAST ONLY)(CPT=74177)    Result Date: 5/10/2024  CONCLUSION:  1. Colonic diverticulosis.  There is mild diffuse pericolonic inflammatory stranding, which raises suspicion for infectious/inflammatory colitis.  No convincing CT manifestations of acute diverticulitis (pericolonic inflammation is not centered along diverticular disease).  No definite abnormal extravasation of contrast into the gastrointestinal tract to suggest an active source of hemorrhage by CT. 2. Stable moderate retrocardiac hiatal hernia. 3. Sequelae of remote granulomatous disease. 4. Lesser incidental findings as above.   elm-remote  Dictated by (CST): Joel Car MD on 5/10/2024 at 1:06 PM     Finalized by (CST): Joel Car MD on 5/10/2024 at 1:15 PM           ED Medications Administered:   Medications   pantoprazole (Protonix) 40 mg in sodium chloride 0.9% PF 10 mL IV push (has no administration in time range)   iopamidol 76% (ISOVUE-370) injection for power injector (80 mL Intravenous Given 5/10/24 1300)       Vitals:    05/10/24 1016 05/10/24 1145 05/10/24 1245 05/10/24 1300   BP: 111/65 106/60 128/56 108/57   Pulse: 84 71 83 71   Resp: 16 17 16 18   Temp: 98 °F (36.7 °C)      TempSrc: Temporal      SpO2: 99% 98% 98% 98%   Weight: 81.6 kg      Height: 177.8 cm (5' 10\")        *I personally reviewed and interpreted all ED vitals.    Pulse Ox interpretation by myself: 99%, Room air, Normal     Monitor Interpretation by myself:   normal sinus rhythm    If Ekg obtained during today's visit, it is independently interpreted by myself directly  below:      Medical Record Review: I personally reviewed available prior medical records for any recent pertinent discharge summaries, testing, and procedures and reviewed those reports.      [facility]     Medical decision making/ED Course:   78-year-old male complaining of 3 days of blood in stool.  No abdominal pain, no abdominal tenderness elicited.  Equal distal pulses with normal blood pressure and heart rate on initial interview.  Symptoms seem most consistent with GI bleed.  Stool exam revealing dark red blood that is strongly heme positive.  Wife states that patient has been reporting fatigue over the last 3 days.  Hemoglobin of 6.7.  Upon chart review patient's last hemoglobin 15 in September 2023.  1 unit of PRBCs ordered and patient to start this blood transfusion in the emergency room.  Glucose, sodium, potassium essentially normal.  Creatinine normal.  Stool culture sent and negative for viral illness.  INR normal at 1.07.  No use of anticoagulation/antiplatelet use per his history.  Case discussed with GI on-call and hospitalist listed admission order who agrees with ER management and starting pantoprazole 40 mg IV.  Upon numerous reevaluations, patient remained in no distress, smiling and hemodynamically stable suggesting imminent life-threatening GI bleed.  Patient will likely benefit from endoscopy/colonoscopy, GI to discuss this with the patient  CT scan negative for diverticulitis.  Brisk GI bleed, ttp, DIC, anemia, hemodynamic instability, among other life-threatening medical conditions considered and seems unlikely given patient's history, exam, and appearance.  Pt agrees and is aware of plan.       Differential Diagnosis:  as listed above in medical decision making.   *Please note that in the presenting to the emergency department, illness/injury that poses a threat to life or function is considered during this patient's initial evaluation.    The complexity of this visit is therefore inherently more  complex given the need to consider life threatening pathology prior to any other etiology for this patient's visit.    The differential diagnosis and medical decision above exemplify this rationale.       Medical Decision Making  Problems Addressed:  Gastrointestinal hemorrhage, unspecified gastrointestinal hemorrhage type: acute illness or injury    Amount and/or Complexity of Data Reviewed  Independent Historian: carley  External Data Reviewed: labs, radiology and notes.  Labs: ordered. Decision-making details documented in ED Course.  Radiology: ordered and independent interpretation performed. Decision-making details documented in ED Course.  ECG/medicine tests: ordered and independent interpretation performed. Decision-making details documented in ED Course.  Discussion of management or test interpretation with external provider(s): Gastroenterologist listed in order  Hospitalist listed in order    Risk  Decision regarding hospitalization.  Elective major surgery with identified risk factors.    Critical Care  Total time providing critical care: 34 minutes      ED Course as of 05/10/24 1334  ------------------------------------------------------------  Time: 05/10 1145  Comment: Patient remains in no distress, smiling, hemodynamically stable with no signs of hemodynamic instability.  Normal blood pressure, no tachycardia  ------------------------------------------------------------  Time: 05/10 1215  Comment: No hypotension, no tachycardia, he remains in good spirits, no distress  ------------------------------------------------------------  Time: 05/10 1300  Comment: Patient smiling, no distress, ambulating in room.  Hemodynamically remains stable with no hypotension and blood pressure of 108 systolic.  Pulse less than 100.       Vitals:    05/10/24 1016 05/10/24 1145 05/10/24 1245 05/10/24 1300   BP: 111/65 106/60 128/56 108/57   Pulse: 84 71 83 71   Resp: 16 17 16 18   Temp: 98 °F (36.7 °C)      TempSrc:  Temporal      SpO2: 99% 98% 98% 98%   Weight: 81.6 kg      Height: 177.8 cm (5' 10\")        Complicating Factors: Significant medical problems that contribute to the complexity of this emergency room evaluation is listed above.    Condition upon leaving the department: Stable    Disposition and Plan     Clinical Impression:  1. Gastrointestinal hemorrhage, unspecified gastrointestinal hemorrhage type      Disposition:  Admit  Hospital Problems       Present on Admission  Date Reviewed: 2/3/2024            ICD-10-CM Noted POA    * (Principal) Gastrointestinal hemorrhage, unspecified gastrointestinal hemorrhage type K92.2 5/10/2024 Unknown                  5/10 GI  Reason for Consultation:  Rectal Bleeding, Anemia     History of Present Illness:  Constantin Tabor Jr. is a 78 year old male w/ PMHx of Diverticulosis c/b recurrent diverticulitis (2 previous episodes), GERD, esophagitis, hiatal hernia, esophageal stricture requiring dilation, actinic keratosis, cervical radiculopathy, HLD, who presents to the ED with diarrhea and rectal bleeding.     Pt's wife at bedside and assisted with HPI.     Pt states that he ate some old watermelon that was cut up in the refrigerator prior to the onset of watery stools on Wednesday.  He reports brown stools had small amount of BRB in the toilet bowl as well.  Watery stools continued every 2-3 hours through today but amount of blood worsened with change in color to a dark/maroon color, with mostly blood mixed with stool.  He denies ABD pain, N/V, dyspepsia, dysphagia, constipation, black stools, fever, chills, chest pain, SOB and unintentional weight loss.  He denies travel outside the [place], recent ABX use and other sick contacts.  He reports frequent NSAID use for shoulder pain.  He notes having diverticulitis in the past but does not feel this is the same as he has no pain.     Per chart review, Pt presented to ED 6/2023 for ABD pain, which he attributed to taking NSAIDs on empty  stomach.  CT at that time revealed proximal and distal diverticulitis.  Pt given 1 dose of IV unasyn and discharged on 7 days of Augmentin.  He was recommended to have follow up imaging and repeat colonoscopy within 3 months, but was lost to follow.    Assessment & Plan   Constantin Tabor Jr. is a 78 year old male w/ PMHx of Diverticulosis c/b recurrent diverticulitis (2 previous episodes), GERD, esophagitis, hiatal hernia, esophageal stricture requiring dilation, actinic keratosis, cervical radiculopathy, HLD, who presents to the ED with diarrhea and rectal bleeding.     #Rectal Bleeding  #Diarrhea  #Anemia  -Pt reports watery/bloody diarrhea starting Wednesday that has worsened in frequency and amount of blood.  He reports current NSAID use r/t shoulder pain.  -Labs on admission reveal acute anemia from baseline Hgb 14-16 to 6.7 associated with weakness/fatigue.  WBC 14 with ANC 9.7, BUN 25  -Pt with hx of diverticulitis but denies ABD pain  -CT A/P pending  -Dark loose stool noted in the ED bathroom  -Last EGD/CLN 2018 with esophageal stricture requiring dilation, esophagitis and diverticulosis  -Etiology of bleeding unclear with reported BRBPR, now marroon/black stools concerning for possible upper GIB in setting of NSAID use vs diverticular bleed with known severe diverticulosis vs less likely neoplasm.  Discussed risk/benefit of EGD/CLN with Pt who states understanding and is willing to proceed.     EGD consent: I have discussed the risks, benefits, and alternatives to upper endoscopy/enteroscopy with the patient/primary decision maker [who demonstrated understanding], including but not limited to the risks of bleeding, infection, pain, death, as well as the risks of anesthesia and perforation all leading to prolonged hospitalization, surgical intervention, or even death. I also specifically mentioned the miss rate of upper endoscopy of 5-10% in the best of all circumstances.  The patient has agreed to sign an  informed consent and elected to proceed with procedure with possible intervention [i.e. polypectomy, possible dilation, stent placement, etc.] as indicated.     Colonoscopy consent: I have discussed the risks, benefits, and alternatives to colonoscopy with the patient/primary decision maker [who demonstrated understanding], including but not limited to the risks of bleeding, infection, pain, death, as well as the risks of anesthesia and perforation all leading to prolonged hospitalization, surgical intervention, or even death. I also specifically mentioned the miss rate of colonoscopy of 5-10% in the best of all circumstances. The patient has agreed to sign an informed consent and elected to proceed with colonoscopy with possible intervention [i.e. polypectomy, stent placement, etc.] as indicated.      Recommend:  -1 unit PRBCs  -Ok for clear liquids from GI stance  -EGD/colonoscopy tomorrow  -2L Golytely 1800, 2L at 0300  -IV PPI BID  -Trend Hgb, transfuse to >7  -Monitor for overt GIB  -Stool studies          5/10 H&P  CHIEF COMPLAINT:  Melena/gastrointestinal bleed and posthemorrhagic anemia.     HISTORY OF PRESENT ILLNESS:  The patient is a 78-year-old  male who came into the emergency department for evaluation of dark bowel movements and progressive weakness for the last 2 to 3 weeks.  CBC showed hemoglobin of 6.7 which has dropped from a baseline of 15, white blood cell count of 14.1.  Chemistry showed BUN/creatinine ratio of 25.5.  PT/INR normal.  The patient was initiated on Protonix and blood transfusion.  CT scan of the abdomen is still pending.     PAST MEDICAL HISTORY:  He had prior gastrointestinal bleed; upper endoscopy showed esophagitis and hiatal hernia, colonoscopy showed diverticulosis.  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    REVIEW OF SYSTEMS:  The patient reports that he has been  seeing dark bowel movements on a daily basis for at least 2 weeks with progressive fatigue, weakness, and dyspnea on exertion.  He denies any significant abdominal pain.  No fever or chills.  No recent antibiotic use.  Other 12-point review of systems negative.     PHYSICAL EXAMINATION:  GENERAL:  Alert.  Oriented to time, place, and person.  Pale in color.  No acute distress.  VITAL SIGNS:  Temperature 98.0, pulse 71, respiratory rate 17, blood pressure 106/60, pulse ox 98% on room air.  HEENT:  Atraumatic.  Oropharynx clear.  Moist mucous membranes.  Ears and nose normal.  Eyes:  Anicteric sclerae.  NECK:  Supple.  No lymphadenopathy.  Trachea midline.  Full range of motion.  LUNGS:  Clear to auscultation bilaterally.  Normal respiratory effort.  HEART:  Regular rate and rhythm.  S1 and S2 auscultated.  No murmur.  ABDOMEN:  Soft, nondistended.  No tenderness.  Positive bowel sounds.  EXTREMITIES:  No peripheral edema, clubbing, or cyanosis.  NEUROLOGIC:  Motor and sensory intact.     ASSESSMENT AND PLAN:  1.       Melena/gastrointestinal bleed, most likely related to NSAID use.  Rule out peptic ulcer disease or _________  esophagitis.  2.       Profound posthemorrhagic anemia.      Patient will be admitted to general medical floor.  IV Protonix.  Blood transfusion.  Monitor hemodynamic status.  Clear liquid diet.  Gastroenterology consult.  Further recommendations to follow.          ESOPHAGOGASTRODUODENOSCOPY (EGD) & COLONOSCOPY REPORT   Date of procedure: 05/11/24     Procedure: EGD & Colonoscopy     Pre-operative diagnosis: rectal bleeding, acute blood loss anemia      Post-operative diagnosis: old blood throughout the colon, diverticulosis, hiatal hernia and possible paraesophageal hernia, bile acid reflux      Medications: MAC    EGD findings:       1. Esophagus: The squamocolumnar junction was noted at 34 cm and appeared regular. The diaphragmatic pinch was noted noted at 41 cm from the incisors. A 7 cm  hiatal hernia was present. The esophagus appeared mildly dilated throughout. There was a small linear erosion in the distal esophagus. There was bile acid reflux noted in the hernia sac and throughout the esophagus.   2. Stomach: The stomach distended normally. Normal rugal folds were seen. The pylorus was patent. Retroflexion revealed a possible paraesophageal hernia and hill grade IV gastroesophageal junction. The gastric mucosa appeared normal. There was bile acid reflux throughout the stomach.   3. Duodenum: The duodenal mucosa appeared normal in the bulb and 2nd portion of the duodenum. There was bile present in the duodenum.      EGD Impression:  -Esophagus: Large hiatal hernia measuring 7 cm in size. Mild dilatation to the entire esophagus. Small linear erosion in the distal esophagus. This is not the source of patient's bleeding. Bile acid reflux.   -Stomach: Possible paraesophageal hernia seen on retroflexion; otherwise normal. Bile acid reflux.   -Duodenum: Normal.   -No sources of bleeding on upper endoscopy.      Colonoscopy findings:     1. Old blood and clots noted throughout the entire colon. The colon was irrigated with no findings of active bleeding. There was severe pan-diverticulosis with multiple diverticula impacted with stool.   2. No polyps seen but prep was poor due to retained blood.   3. Ileocecal valve appeared normal. Terminal ileum was unable to be intubated due to significant looping.   4. Visualization of the colonic mucosa was limited due to retained blood and clots but what was seen of the mucosa appeared to have normal vascular pattern, without evidence of angioectasias or inflammation.   5. Retroflexion was deferred due to large rectal diverticula. Careful forward withdrawal of the colonoscope through the anal canal revealed small internal hemorrhoids.  6. FERMÍN: normal rectal tone, no masses palpated.      Colonoscopy Impression:  Presumed diverticular bleed with significant amount  of old blood and clots throughout the entire colon.   Small internal hemorrhoids.      Recommend:  Continue to monitor Hb q8h. We expect pt to continue passing old blood in his stool. If he becomes hemodynamically unstable and/or has significant drop in Hb, would recommend STAT CTA with IR consult.   Daily PO PPI.   OK for CLD today.           5/11 IM  Objective:   Blood pressure 114/53, pulse 60, temperature 97.6 °F (36.4 °C), temperature source Oral, resp. rate 18, height 5' 10\" (1.778 m), weight 169 lb 12.8 oz (77 kg), SpO2 91%.     Gen:   NAD.  A and O x 3  CV:   RRR, no m/g/r  Pulm:   left basilar crackles, CTA bilat  Abd:   +bs, soft, NT, ND  LE:   No c/c/e  Neuro:   nonfocal     Results:            Lab Results   Component Value Date     WBC 9.3 05/11/2024     HGB 7.4 (L) 05/11/2024     HCT 22.0 (L) 05/11/2024     .0 05/11/2024     CREATSERUM 0.79 05/11/2024     BUN 14 05/11/2024      05/11/2024     K 3.3 (L) 05/11/2024      05/11/2024     CO2 28.0 05/11/2024      (H) 05/11/2024     CA 7.6 (L) 05/11/2024     Assessment and Plan:      Melena  GIB  GIB possibly related to NSAID use.  Posthemorrhagic anemia.   Probable diverticular bleed.  - GI on consult  - EGD and colonoscopy done showing evidence of probable diverticular bleed  - cont protonix  - follow hgb Q8 hrs  - transfuse prn for hgb <7  - CLD for now  - stop NSAIDs     Hypoxia  Currently on 2L o2  - stop IVF  - wean o2  - incentive spirometer     Hx of HTN  - hold norvasc     dvt proph:    SCDs     Code status:    Full        MDM:    High          5/12 RN  Patient left against medical advice despite repeated attempts to have patient wait for Hospitalist to discuss care and plan. Hospitalist made aware of patient's request to discharge this AM however MD was not comfortable discharging given medical circumstances. Patient ultimately was not willing to wait for MD.             Medications 05/10/24 05/11/24 05/12/24   furosemide  (Lasix) 10 mg/mL injection 20 mg  Dose: 20 mg  Freq: Once Route: IV  Start: 05/11/24 0815 End: 05/11/24 1250   Admin Instructions:   After transfusion     1250 TQ-Given           pantoprazole (Protonix) 40 mg in sodium chloride 0.9% PF 10 mL IV push  Dose: 40 mg  Freq: Every 12 hours Route: IV  Start: 05/10/24 1341 End: 05/11/24 1048   Admin Instructions:   Dilute with 10 ml NS; IV push over 2 minutes    1410 CB-Given        0333 ML-Given   1048-D/C'd        polyethylene glycol-electrolyte (Golytely) 236 g oral solution 4,000 mL  Dose: 4,000 mL  Freq: Once Route: OR  Start: 05/10/24 1800 End: 05/10/24 1822   Admin Instructions:   Split dose golytely, 2L at 1800, 2L at 0300    1822 CW-Given            potassium chloride (Klor-Con M20) tab 40 mEq  Dose: 40 mEq  Freq: Once Route: OR  Start: 05/11/24 1900 End: 05/11/24 1901   Admin Instructions:   Do not crush     1901 CM-Given           sodium chloride 0.9% infusion  Freq: Once Route: IV  Start: 05/11/24 0815 End: 05/11/24 0859   Admin Instructions:   To standard blood administration set with integral filter. Change every 4 hours or after 2 units, whichever comes first. ALERT: NEVER mix any medication or solution with blood or blood products.  Run at KVO rate     0859 TQ-New Bag                    Medications 05/10/24 05/11/24 05/12/24   sodium chloride 0.9% infusion  Rate: 83 mL/hr  Freq: Continuous Route: IV  Start: 05/10/24 1545 End: 05/11/24 1310    1741 CW-New Bag        0333 ML-New Bag   1252 TQ-Stopped   1305 TQ-Rate/Dose Change     1310-D/C'd           Medications 05/10/24 05/11/24 05/12/24   acetaminophen (Tylenol Extra Strength) tab 500 mg  Dose: 500 mg  Freq: Every 4 hours PRN Route: OR  PRN Reason: Fever  PRN Comment: equal to or greater than 100.4  Start: 05/10/24 1533 End: 05/12/24 1432   Admin Instructions:   do not initiate oral therapy until 6-8 hours after the last IV acetaminophen dose if IV acetaminophen was used previously     1426 TQ-Given [C]         1432-D/C'd        alum-mag hydroxide-simethicone (Maalox) 200-200-20 MG/5ML oral suspension 30 mL  Dose: 30 mL  Freq: DAILY PRN Route: OR  PRN Reason: Indigestion  Start: 05/11/24 1936 End: 05/12/24 1432 1944 CG-Given        1432-D/C'd            Vitals (last day) before discharge       Date/Time Temp Pulse Resp BP SpO2 Weight O2 Device O2 Flow Rate (L/min) [facility]    05/12/24 1209 -- 92 -- -- -- -- -- -- RW    05/12/24 0903 97.5 °F (36.4 °C) 70 18 117/55 95 % -- None (Room air) --     05/12/24 0546 98.1 °F (36.7 °C) 81 18 117/63 95 % -- Nasal cannula 1.5 L/min TR    05/11/24 2301 -- -- -- -- 95 % -- -- 1 L/min TR    05/11/24 2254 98.3 °F (36.8 °C) 81 20 110/63 93 % -- None (Room air) -- TR    05/11/24 1946 98.4 °F (36.9 °C) 88 18 101/64 91 % -- None (Room air) -- CG    05/11/24 1903 -- 92 -- -- -- -- -- -- GT    05/11/24 1658 98.4 °F (36.9 °C) 90 20 115/64 93 % -- None (Room air) -- TQ    05/11/24 1428 97.7 °F (36.5 °C) 86 22 113/47 91 % -- Nasal cannula 2 L/min TQ    05/11/24 1400 -- 77 -- -- -- -- -- -- BP    05/11/24 1301 -- -- -- -- 91 % -- Nasal cannula 2 L/min TQ    05/11/24 1300 -- -- -- -- 87 % -- None (Room air) -- TQ    05/11/24 1152 97.6 °F (36.4 °C) 60 18 114/53 91 % -- Nasal cannula 2 L/min CM    05/11/24 1135 -- 59 20 113/62 92 % -- Nasal cannula 2 L/min TS    05/11/24 1130 -- 62 18 102/65 94 % -- -- -- TS    05/11/24 1125 -- 59 17 110/58 95 % -- -- -- TS    05/11/24 1120 -- 59 16 113/51 95 % -- -- -- TS    05/11/24 1115 -- 60 17 110/57 96 % -- -- -- TS    05/11/24 1110 -- -- -- 93/56 -- -- -- -- MP    05/11/24 1110 -- 60 17 -- 96 % -- Nasal cannula 2 L/min NT    05/11/24 1105 -- 61 16 103/61 95 % -- Nasal cannula 2 L/min NT    05/11/24 1100 -- 64 19 109/60 95 % -- Nasal cannula 2 L/min NT    05/11/24 1055 -- 65 20 100/58 95 % -- Nasal cannula 2 L/min NT    05/11/24 1050 -- 68 19 102/55 95 % -- Nasal cannula 4 L/min NT    05/11/24 1045 -- 65 15 87/50 95 % -- Nasal cannula 5 L/min NT    05/11/24  1038 98 °F (36.7 °C) 67 15 83/48 93 % -- -- -- VE    05/11/24 0945 -- 67 26 -- 98 % -- -- -- NT    05/11/24 0944 98 °F (36.7 °C) 67 26 110/56 98 % -- -- -- NT    05/11/24 0915 98.1 °F (36.7 °C) 67 17 105/65 93 % -- -- -- NT    05/11/24 0905 -- 62 10 111/49 96 % -- -- -- NT    05/11/24 0900 98 °F (36.7 °C) 61 10 112/56 97 % -- -- -- NT    05/11/24 0855 -- 56 12 110/56 97 % -- -- -- NT    05/11/24 0845 -- 65 13 119/57 98 % -- -- -- NT    05/11/24 0837 98 °F (36.7 °C) 69 15 105/65 96 % -- -- -- NT    05/11/24 0830 -- 64 13 113/58 96 % -- -- -- NT    05/11/24 0747 98.1 °F (36.7 °C) -- 18 118/61 98 % -- None (Room air) -- TQ    05/11/24 0332 98.2 °F (36.8 °C) 70 16 107/68 98 % -- None (Room air) -- ML       Blood Transfusion Record       Product Unit Status Volume Start End            Transfuse RBC       24  [id number]  [id number] Completed 05/11/24 0945 340.5 mL 05/11/24 0837 05/11/24 0945       24  [id number]  [id number] Completed 05/10/24 1828 280 mL 05/10/24 1428 05/10/24 1648                          --------------  DISCHARGE REVIEW    Payor: [employer]  Subscriber #:  [id number]  Authorization Number: [id number]    Admit date: 5/10/24  Admit time:   3:32 PM  Discharge Date: 5/12/2024 12:31 PM     Admitting Physician: Lv Urban MD  Attending Physician:  No att. providers found  Primary Care Physician: Fabricio Morales MD          Discharge Summary Notes        Discharge Summary signed by Martin Obregon MD at 5/13/2024  8:29 AM        Author: Martin Obregon MD Specialty: HOSPITALIST Author Type: Physician    Filed: 5/13/2024  8:29 AM Status: Signed    : Martin Obregon MD (Physician)         DISCHARGE SUMMARY    MEGHAN JOSUE JR. [id number]   YOB: 1945 [id number]         [facility]    PATIENT'S NAME: MEGHAN JOSUE JR.   ATTENDING PHYSICIAN: Martin Obregon MD   PATIENT ACCOUNT#:   [id number]    LOCATION:  [address] [place] [address]  MEDICAL RECORD  #:   [id number]       YOB: 1945  ADMISSION DATE:       05/10/2024      DISCHARGE DATE:  05/12/2024    DISCHARGE SUMMARY  AMA discharge    Discharge Dx: lower gi iron deficiency anemia from likely diverticular blood loss complicated by neutrophilia of unknown cause      HISTORY AND HOSPITAL COURSE:  This is a 78-year-old male who presented with a history of melena, GI bleeding, and posthemorrhagic anemia.  He was transfused with blood yesterday as his hemoglobin had gone as low as 6.6.  He was seen by Dr. Cohen yesterday but I inherited the patient today because Dr. Cohen had too many patients.  I had planned to see the patient in the afternoon and perform workup because of his elevated white blood count that after the procedure had gone quite high to 24,000 and 12.7000, but I never really had the chance.  I got a call from the nurse that he demanded to see me right away or he is leaving against medical advice.  Unfortunately, I was involved in the care of an ICU patient and I could not come up when he wanted me to, and the patient left against medical advice.  I told the nurse that I would not have discharged him because I wanted to work up the white blood count.  I would have wanted to watch the hemoglobin 1 more day because of his age and frailty.    PHYSICAL EXAMINATION ON DISCHARGE:  Only from the chart.  His vital signs are 97.5, pulse 92, respiratory rate 18, blood pressure 117/95, 95%.  I was not able to do a physical exam.    ASSESSMENT AND PLAN:    1.   Probable diverticular lower GI blood loss anemia probably from diverticulosis with iron deficiency.  I would have wanted to watch the patient and see how he does.  2.   Hypoxia probably from atelectasis, reason unknown.  Needs investigation.  3.   Hypertension.  The patient is actually relatively hypotensive to his baseline, again concerning.  4, neutrophilia poss aspiration would have wanted cxr  CONDITION ON DISCHARGE:  Unstable,  against medical advice.    CODE STATUS:  Per chart, full code.  I never saw or talked to tolerated the procedure well.    DISCHARGE MEDICATIONS:  There was no chance to reconcile or give him any medications on discharge.  Needs to see Dr. Morales.    DISCHARGE INSTRUCTIONS:  Activity and diet as tolerated.  Follow up with Dr. Morales as soon as possible.      RISK OF READMISSION:  Very high.  He is not stable to go home and will likely come back.  TCM followup is recommended.    Dictated By Martin Obregon MD  d: 05/12/2024 13:32:11  t: 05/12/2024 20:12:57  Job [id number]  LAS/    Electronically signed by Martin Obregon MD on 5/13/2024  8:29 AM         REVIEWER COMMENTS

## 2024-07-15 ENCOUNTER — OFFICE VISIT (OUTPATIENT)
Facility: CLINIC | Age: 79
End: 2024-07-15

## 2024-07-15 VITALS
HEART RATE: 60 BPM | DIASTOLIC BLOOD PRESSURE: 77 MMHG | HEIGHT: 70 IN | WEIGHT: 174 LBS | BODY MASS INDEX: 24.91 KG/M2 | SYSTOLIC BLOOD PRESSURE: 129 MMHG

## 2024-07-15 DIAGNOSIS — K29.50 CHRONIC GASTRITIS WITHOUT BLEEDING, UNSPECIFIED GASTRITIS TYPE: ICD-10-CM

## 2024-07-15 DIAGNOSIS — K92.2 LOWER GI BLEEDING: Primary | ICD-10-CM

## 2024-07-15 DIAGNOSIS — K21.00 GASTROESOPHAGEAL REFLUX DISEASE WITH ESOPHAGITIS WITHOUT HEMORRHAGE: ICD-10-CM

## 2024-07-15 RX ORDER — PANTOPRAZOLE SODIUM 40 MG/1
40 TABLET, DELAYED RELEASE ORAL
Qty: 90 TABLET | Refills: 3 | Status: SHIPPED | OUTPATIENT
Start: 2024-07-15

## 2024-07-15 NOTE — PATIENT INSTRUCTIONS
1.  Maintain high-fiber diet.  2.  Continue pantoprazole.  3.  Blood work through Dr. Morales.  4.  Follow-up office visit in 1 year or sooner if issues arise.

## 2024-07-15 NOTE — PROGRESS NOTES
Subjective:   Patient ID: Constantin Tabor Jr. is a 78 year old male.    HPI  The patient returns in follow-up with his wife following a recent hospitalization from 5/10/2024 - 5/12/2024.  He was attended by Dr. Preciado.  He was last seen by myself in July 2023.    As per previous notes the patient underwent upper and lower endoscopy in July 2018 for a personal history of adenomatous colon polyps and episodes of gastroesophageal reflux with possible esophageal spasm/dysphagia.  The patient was found to have #2 subcentimeter tubular adenomata, colonic lipomata, extensive pancolonic diverticulosis, LA grade D esophagitis and a 5 cm hiatal hernia with a possible slight esophageal stricture.  The stricture was dilated and the patient was placed on pantoprazole.  Follow-up endoscopy in October 2018 revealed that the esophagitis had healed.  A 3 cm hiatal hernia was present with associated residual gastritis.  A surveillance colonoscopy was advised in July 2023.    In February 2019 the patient presented to the emergency room with abdominal pain.  A CT scan revealed:  \"1.  Severe diverticulosis involving the entire colon.  2.  Acute diverticulitis at the splenic flexure.  3.  Mild or acute diverticulitis at the junction of the descending colon and sigmoid colon as well as in the region of the hepatic flexure.  4.  No evidence of abscess or free air.\"  The patient was placed on amoxicillin/clavulanic acid and his symptoms promptly resolved.  He described treatment with the antibiotic and treatment with the pantoprazole as \"amongst the best things that have been prescribed.\"    The patient has a longstanding history of a low normal hemoglobin.  In April 2018 he was found to have an iron deficiency anemia with a hemoglobin of 13.2 that decreased to a mookie of 9.9 in May 2019.  Mookie iron saturation and ferritin were 4% and 5.5 respectively in June 2019.  The patient was prescribed daily iron supplementation which he was  compliant with for only a few weeks.    In June 2023 the patient presented to the ED with lower abdominal pain following ingestion of #2 Advil tablets for a swollen foot.  He was found to have a leukocytosis.  A CT scan revealed \"proximal and distal diverticulitis\" without complication.  The patient was given a dose of intravenous Unasyn and discharged on a 7-day course of Augmentin.  The patient was advised to schedule a colonoscopy but did not do so.  He was advised to continue PPI therapy for the history of complicated gastroesophageal reflux (LA grade D esophagitis).    Recent history:  On May 10, 2024 the patient presented to the ED with a 3-day history of blood mixed with his stool.  He was hemodynamically stable.  Hemoglobin had decreased to 6.7.  He was seen by Dr Preciado.  He was transfused 2 units of packed red blood cells.  Upper and lower endoscopy revealed a 7 cm hiatal hernia, a small nonbleeding linear erosion in the distal esophagus and old blood and clots throughout the entire colon.  The ileum was not able to be intubated.  Bleeding was felt to be diverticular in nature.  The patient also developed a leukocytosis however, signed out AMA prior to evaluation.  Discharge parameters were as follows:    WBC: 21.7  Hgb: 8.6  HCT: 25.2  Platelets: 325,000.    Current history:  The patient has been well since hospital discharge.  He has had no further bleeding.  His appetite is excellent.  Weight is stable.  He has no heartburn on pantoprazole once daily.  No dysphagia.  No abdominal pain.  Bowel movements are regular.    He is an avid  and eats large amounts of fruits and vegetables.    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  He has no cardiopulmonary symptoms or limitations.    Subjective wellbeing is excellent.  History/Other:   Review of Systems  See above    Wt Readings from Last 7 Encounters:   07/15/24 174 lb (78.9 kg)   05/10/24 169 lb 12.8 oz (77 kg)   02/03/24 170  lb (77.1 kg)   09/12/23 173 lb (78.5 kg)   07/24/23 173 lb (78.5 kg)   06/17/23 185 lb (83.9 kg)   09/07/22 178 lb (80.7 kg)           Current Outpatient Medications   Medication Sig Dispense Refill    amLODIPine 5 MG Oral Tab Take 1 tablet (5 mg total) by mouth every morning. 90 tablet 3    pantoprazole 40 MG Oral Tab EC Take 1 tablet (40 mg total) by mouth before breakfast. 90 tablet 3     Allergies:No Known Allergies    Objective:   Physical Exam  Vitals and nursing note reviewed.   Constitutional:       General: He is not in acute distress.     Appearance: He is well-developed. He is not ill-appearing, toxic-appearing or diaphoretic.   HENT:      Mouth/Throat:      Pharynx: No oropharyngeal exudate.   Eyes:      General: No scleral icterus.     Conjunctiva/sclera: Conjunctivae normal.   Neck:      Thyroid: No thyromegaly.   Cardiovascular:      Rate and Rhythm: Normal rate and regular rhythm.      Heart sounds: Normal heart sounds. No murmur heard.  Pulmonary:      Effort: Pulmonary effort is normal. No respiratory distress.      Breath sounds: Normal breath sounds. No wheezing or rales.   Abdominal:      General: Bowel sounds are normal. There is no distension.      Palpations: Abdomen is soft. There is no mass.      Tenderness: There is no abdominal tenderness. There is no guarding or rebound.   Musculoskeletal:      Cervical back: Neck supple.   Lymphadenopathy:      Cervical: No cervical adenopathy.   Neurological:      Mental Status: He is alert and oriented to person, place, and time.   Psychiatric:         Behavior: Behavior normal.         Assessment & Plan:   1. Lower GI bleeding    2. Gastroesophageal reflux disease with esophagitis without hemorrhage    The patient was hospitalized in May 2024 for gastrointestinal bleeding likely due to a self-limited colonic diverticular bleed.  He has had no symptoms and is feeling well.  I have recommended that he continue a high-fiber diet.  He also has  complicated gastroesophageal reflux with LA grade D esophagitis.  He is asymptomatic and will continue his pantoprazole.  Laboratory testing will be repeated at the time of his upcoming visit with Dr. Morales in September.  The patient will otherwise follow-up in the office in 1 year or as needed.        Meds This Visit:  Requested Prescriptions      No prescriptions requested or ordered in this encounter       Imaging & Referrals:  None

## 2024-09-17 ENCOUNTER — LAB ENCOUNTER (OUTPATIENT)
Dept: LAB | Age: 79
End: 2024-09-17
Attending: INTERNAL MEDICINE
Payer: MEDICARE

## 2024-09-17 ENCOUNTER — OFFICE VISIT (OUTPATIENT)
Dept: INTERNAL MEDICINE CLINIC | Facility: CLINIC | Age: 79
End: 2024-09-17

## 2024-09-17 VITALS
HEART RATE: 64 BPM | SYSTOLIC BLOOD PRESSURE: 118 MMHG | RESPIRATION RATE: 18 BRPM | TEMPERATURE: 98 F | BODY MASS INDEX: 25.2 KG/M2 | WEIGHT: 176 LBS | OXYGEN SATURATION: 97 % | DIASTOLIC BLOOD PRESSURE: 64 MMHG | HEIGHT: 70 IN

## 2024-09-17 DIAGNOSIS — Z12.11 COLON CANCER SCREENING: ICD-10-CM

## 2024-09-17 DIAGNOSIS — K92.2 GASTROINTESTINAL HEMORRHAGE, UNSPECIFIED GASTROINTESTINAL HEMORRHAGE TYPE: ICD-10-CM

## 2024-09-17 DIAGNOSIS — E55.9 VITAMIN D DEFICIENCY: ICD-10-CM

## 2024-09-17 DIAGNOSIS — D50.8 OTHER IRON DEFICIENCY ANEMIA: ICD-10-CM

## 2024-09-17 DIAGNOSIS — I10 PRIMARY HYPERTENSION: ICD-10-CM

## 2024-09-17 DIAGNOSIS — K29.50 CHRONIC GASTRITIS WITHOUT BLEEDING, UNSPECIFIED GASTRITIS TYPE: ICD-10-CM

## 2024-09-17 DIAGNOSIS — Z00.00 ENCOUNTER FOR ANNUAL HEALTH EXAMINATION: ICD-10-CM

## 2024-09-17 DIAGNOSIS — L40.9 PSORIASIS AND SIMILAR DISORDER: ICD-10-CM

## 2024-09-17 DIAGNOSIS — Z85.828 HISTORY OF BASAL CELL CARCINOMA: ICD-10-CM

## 2024-09-17 DIAGNOSIS — Z00.00 ENCOUNTER FOR ANNUAL HEALTH EXAMINATION: Primary | ICD-10-CM

## 2024-09-17 PROBLEM — D50.9 MICROCYTIC ANEMIA: Status: RESOLVED | Noted: 2019-06-05 | Resolved: 2024-09-17

## 2024-09-17 PROBLEM — C44.519 BASAL CELL CARCINOMA OF CHEST: Status: RESOLVED | Noted: 2018-11-26 | Resolved: 2024-09-17

## 2024-09-17 PROBLEM — D50.9 IRON DEFICIENCY ANEMIA: Status: ACTIVE | Noted: 2019-06-05

## 2024-09-17 LAB
ALBUMIN SERPL-MCNC: 4.7 G/DL (ref 3.2–4.8)
ALBUMIN/GLOB SERPL: 2 {RATIO} (ref 1–2)
ALP LIVER SERPL-CCNC: 160 U/L
ALT SERPL-CCNC: 17 U/L
ANION GAP SERPL CALC-SCNC: 7 MMOL/L (ref 0–18)
AST SERPL-CCNC: 23 U/L (ref ?–34)
BASOPHILS # BLD AUTO: 0.11 X10(3) UL (ref 0–0.2)
BASOPHILS NFR BLD AUTO: 0.8 %
BILIRUB SERPL-MCNC: 0.8 MG/DL (ref 0.2–1.1)
BUN BLD-MCNC: 16 MG/DL (ref 9–23)
BUN/CREAT SERPL: 16.8 (ref 10–20)
CALCIUM BLD-MCNC: 9.2 MG/DL (ref 8.7–10.4)
CHLORIDE SERPL-SCNC: 107 MMOL/L (ref 98–112)
CHOLEST SERPL-MCNC: 168 MG/DL (ref ?–200)
CO2 SERPL-SCNC: 28 MMOL/L (ref 21–32)
CREAT BLD-MCNC: 0.95 MG/DL
CREAT UR-SCNC: 176.3 MG/DL
DEPRECATED RDW RBC AUTO: 51 FL (ref 35.1–46.3)
EGFRCR SERPLBLD CKD-EPI 2021: 83 ML/MIN/1.73M2 (ref 60–?)
EOSINOPHIL # BLD AUTO: 3.27 X10(3) UL (ref 0–0.7)
EOSINOPHIL NFR BLD AUTO: 23.9 %
ERYTHROCYTE [DISTWIDTH] IN BLOOD BY AUTOMATED COUNT: 22.4 % (ref 11–15)
FASTING PATIENT LIPID ANSWER: NO
FASTING STATUS PATIENT QL REPORTED: NO
GLOBULIN PLAS-MCNC: 2.3 G/DL (ref 2–3.5)
GLUCOSE BLD-MCNC: 86 MG/DL (ref 70–99)
HCT VFR BLD AUTO: 33 %
HDLC SERPL-MCNC: 48 MG/DL (ref 40–59)
HGB BLD-MCNC: 9.7 G/DL
IMM GRANULOCYTES # BLD AUTO: 0.04 X10(3) UL (ref 0–1)
IMM GRANULOCYTES NFR BLD: 0.3 %
IRON SATN MFR SERPL: 3 %
IRON SERPL-MCNC: 14 UG/DL
LDLC SERPL CALC-MCNC: 94 MG/DL (ref ?–100)
LYMPHOCYTES # BLD AUTO: 1.92 X10(3) UL (ref 1–4)
LYMPHOCYTES NFR BLD AUTO: 14 %
MCH RBC QN AUTO: 19.4 PG (ref 26–34)
MCHC RBC AUTO-ENTMCNC: 29.4 G/DL (ref 31–37)
MCV RBC AUTO: 66 FL
MICROALBUMIN UR-MCNC: 0.7 MG/DL
MICROALBUMIN/CREAT 24H UR-RTO: 4 UG/MG (ref ?–30)
MONOCYTES # BLD AUTO: 0.98 X10(3) UL (ref 0.1–1)
MONOCYTES NFR BLD AUTO: 7.2 %
NEUTROPHILS # BLD AUTO: 7.35 X10 (3) UL (ref 1.5–7.7)
NEUTROPHILS # BLD AUTO: 7.35 X10(3) UL (ref 1.5–7.7)
NEUTROPHILS NFR BLD AUTO: 53.8 %
NONHDLC SERPL-MCNC: 120 MG/DL (ref ?–130)
OSMOLALITY SERPL CALC.SUM OF ELEC: 294 MOSM/KG (ref 275–295)
PLATELET # BLD AUTO: 487 10(3)UL (ref 150–450)
PLATELET MORPHOLOGY: NORMAL
POTASSIUM SERPL-SCNC: 4.2 MMOL/L (ref 3.5–5.1)
PROT SERPL-MCNC: 7 G/DL (ref 5.7–8.2)
PSA SERPL-MCNC: 1.18 NG/ML (ref ?–4)
RBC # BLD AUTO: 5 X10(6)UL
SODIUM SERPL-SCNC: 142 MMOL/L (ref 136–145)
TIBC SERPL-MCNC: 539 UG/DL (ref 250–425)
TRANSFERRIN SERPL-MCNC: 362 MG/DL (ref 215–365)
TRIGL SERPL-MCNC: 148 MG/DL (ref 30–149)
TSI SER-ACNC: 4.34 MIU/ML (ref 0.55–4.78)
VIT D+METAB SERPL-MCNC: 27.9 NG/ML (ref 30–100)
VLDLC SERPL CALC-MCNC: 24 MG/DL (ref 0–30)
WBC # BLD AUTO: 13.7 X10(3) UL (ref 4–11)

## 2024-09-17 PROCEDURE — 83540 ASSAY OF IRON: CPT

## 2024-09-17 PROCEDURE — 82570 ASSAY OF URINE CREATININE: CPT

## 2024-09-17 PROCEDURE — 80053 COMPREHEN METABOLIC PANEL: CPT

## 2024-09-17 PROCEDURE — 85060 BLOOD SMEAR INTERPRETATION: CPT

## 2024-09-17 PROCEDURE — 82043 UR ALBUMIN QUANTITATIVE: CPT

## 2024-09-17 PROCEDURE — 84153 ASSAY OF PSA TOTAL: CPT

## 2024-09-17 PROCEDURE — 36415 COLL VENOUS BLD VENIPUNCTURE: CPT

## 2024-09-17 PROCEDURE — 85025 COMPLETE CBC W/AUTO DIFF WBC: CPT

## 2024-09-17 PROCEDURE — 82306 VITAMIN D 25 HYDROXY: CPT

## 2024-09-17 PROCEDURE — 84443 ASSAY THYROID STIM HORMONE: CPT

## 2024-09-17 PROCEDURE — 80061 LIPID PANEL: CPT

## 2024-09-17 PROCEDURE — 84466 ASSAY OF TRANSFERRIN: CPT

## 2024-09-17 RX ORDER — PANTOPRAZOLE SODIUM 40 MG/1
40 TABLET, DELAYED RELEASE ORAL
Qty: 90 TABLET | Refills: 3 | Status: SHIPPED | OUTPATIENT
Start: 2024-09-17

## 2024-09-17 RX ORDER — AMLODIPINE BESYLATE 5 MG/1
5 TABLET ORAL EVERY MORNING
Qty: 90 TABLET | Refills: 3 | Status: SHIPPED | OUTPATIENT
Start: 2024-09-17

## 2024-09-17 NOTE — PROGRESS NOTES
Subjective:   Constantin Tabor Jr. is a 78 year old male who presents for a MA AHA (Medicare Advantage Annual Health Assessment) and scheduled follow up of multiple significant but stable problems.     Patient presents for above.  Here for his Medicare annual wellness exam.     History of hypertension.  In 2022 developed a cough on his ACE inhibitor so this was stopped and switched to amlodipine.  Tolerating medication without side effect.  No chest pain or shortness of breath with activities.  Remains very active at baseline.  Home readings are normal.     History of chronic gastritis without bleeding currently.  Currently taking pantoprazole as needed and this is very well controlled.  He did develop a lower GI bleed in May 2024.  This required 2 units transfusion along with an EGD and colonoscopy.  No source of bleed was found but was thought to be diverticular in nature.  Has not had recheck of his CBC since leaving the hospital.  He did have follow-up with his gastroenterologist in July 2024.    History of vitamin D deficiency.  Needs levels rechecked     History of microcytic anemia.  This is well controlled.  Was taking iron but no longer doing.     History of basal cell carcinoma diagnosed in 2021.  Location was his chest.  No longer following up with dermatology.     Had colonoscopy in 2024 with repeat to be done in 2029.  Urinates 1 time nightly.  Not interested in Shingrix or pneumonia vaccinations.    History/Other:   Fall Risk Assessment:   He has been screened for Falls and is low risk.      Cognitive Assessment:   He had a completely normal cognitive assessment - see flowsheet entries     Functional Ability/Status:   Constantin Tabor Jr. has a completely normal functional assessment. See flowsheet for details.        Depression Screening (PHQ):  PHQ-2 SCORE: 0  , done 9/17/2024   Trouble falling or staying asleep, or sleeping too much: 1     If you checked off any problems, how difficult have these problems  made it for you to do your work, take care of things at home, or get along with other people?: Not difficult at all    Last [place] Suicide Screening on 9/17/2024 was No Risk.    Advanced Directives:   He does have a Living Will but we do NOT have it on file in Epic.    He does have a POA but we do NOT have it on file in Epic.    Discussed Advance Care Planning with patient (and family/surrogate if present). Standard forms made available to patient in After Visit Summary.      Patient Active Problem List   Diagnosis    Primary hypertension    Gastritis without bleeding    Vitamin D deficiency    Iron deficiency anemia    Psoriasis and similar disorder    Gastrointestinal hemorrhage, unspecified gastrointestinal hemorrhage type    History of basal cell carcinoma     Allergies:  He has No Known Allergies.    Current Medications:  Outpatient Medications Marked as Taking for the 9/17/24 encounter (Office Visit) with Fabricio Morales MD   Medication Sig    pantoprazole 40 MG Oral Tab EC Take 1 tablet (40 mg total) by mouth before breakfast.    amLODIPine 5 MG Oral Tab Take 1 tablet (5 mg total) by mouth every morning.       Medical History:  He  has a past medical history of Actinic keratosis (9/25/2018), Acute cervical radiculopathy (12/29/2020), BCC (basal cell carcinoma of skin) (09/2018), BCC (basal cell carcinoma) (2018), Esophageal reflux, Hearing impairment, Lipid screening (02/17/2014), Mixed hyperlipidemia (3/3/2017), Need for vaccination (12/10/2019), Neoplasm of uncertain behavior of skin (9/25/2018), Non-healing skin lesion of nose (1/23/2018), Physical exam, annual (1/23/2018), Psoriasis, and Unspecified essential hypertension.  Surgical History:  He  has a past surgical history that includes electrocardiogram, complete (02/17/2014); appendectomy (1959); colonoscopy (10/2009); egd; colonoscopy (N/A, 7/3/2018); and colonoscopy (N/A, 5/11/2024).   Family History:  His family history includes Cancer in his father;  Diabetes in his mother and sister.  Social History:  He  reports that he quit smoking about 52 years ago. His smoking use included cigarettes. He has never used smokeless tobacco. He reports current alcohol use of about 2.0 standard drinks of alcohol per week. He reports that he does not use drugs.    Tobacco:  He smoked tobacco in the past but quit greater than 12 months ago.  Social History     Tobacco Use   Smoking Status Former    Current packs/day: 0.00    Types: Cigarettes    Quit date: 1972    Years since quittin.7   Smokeless Tobacco Never          CAGE Alcohol Screen:   CAGE screening score of 0 on 2024, showing low risk of alcohol abuse.      Patient Care Team:  Fabricio Morales MD as PCP - General (Internal Medicine)  StatAlex stark MD (GASTROENTEROLOGY)    Review of Systems   Constitutional: Negative.    HENT: Negative.     Eyes: Negative.    Respiratory: Negative.     Cardiovascular: Negative.    Gastrointestinal: Negative.    Endocrine: Negative.    Genitourinary: Negative.    Musculoskeletal: Negative.    Skin: Negative.    Allergic/Immunologic: Negative.    Neurological: Negative.    Hematological: Negative.    Psychiatric/Behavioral: Negative.       Objective:   Physical Exam  Constitutional:       Appearance: Normal appearance. He is well-developed.   HENT:      Head: Normocephalic.      Right Ear: Tympanic membrane, ear canal and external ear normal. There is no impacted cerumen.      Left Ear: Tympanic membrane, ear canal and external ear normal. There is no impacted cerumen.   Eyes:      General: No scleral icterus.     Pupils: Pupils are equal, round, and reactive to light.   Neck:      Vascular: No JVD.   Cardiovascular:      Rate and Rhythm: Normal rate and regular rhythm.      Pulses: Normal pulses.      Heart sounds: Normal heart sounds. No murmur heard.  Pulmonary:      Effort: Pulmonary effort is normal. No respiratory distress.      Breath sounds: Normal breath sounds.  No stridor. No wheezing, rhonchi or rales.   Chest:      Chest wall: No tenderness.   Abdominal:      General: Abdomen is flat. Bowel sounds are normal. There is no distension.      Palpations: Abdomen is soft.      Tenderness: There is no abdominal tenderness. There is no guarding or rebound.   Musculoskeletal:         General: Normal range of motion.      Cervical back: Normal range of motion.   Lymphadenopathy:      Cervical: No cervical adenopathy.   Skin:     General: Skin is warm.   Neurological:      General: No focal deficit present.      Mental Status: He is alert.      Cranial Nerves: No cranial nerve deficit.   Psychiatric:         Mood and Affect: Mood normal.         Behavior: Behavior normal.       /64 (BP Location: Right arm, Patient Position: Sitting, Cuff Size: adult)   Pulse 64   Temp 98 °F (36.7 °C) (Temporal)   Resp 18   Ht 5' 10\" (1.778 m)   Wt 176 lb (79.8 kg)   SpO2 97%   BMI 25.25 kg/m²  Estimated body mass index is 25.25 kg/m² as calculated from the following:    Height as of this encounter: 5' 10\" (1.778 m).    Weight as of this encounter: 176 lb (79.8 kg).    Medicare Hearing Assessment:   Hearing Screening    Screening Method: Finger Rub  Finger Rub Result: Pass               Assessment & Plan:   Constantin Tabor Jr. is a 78 year old male who presents for a Medicare Assessment.     1. Encounter for annual health examination (Primary)  -     CBC With Differential With Platelet; Future; Expected date: 09/17/2024  -     Comp Metabolic Panel (14); Future; Expected date: 09/17/2024  -     Lipid Panel; Future; Expected date: 09/17/2024  -     TSH W Reflex To Free T4; Future; Expected date: 09/17/2024  -     PSA Total, Diagnostic; Future; Expected date: 09/17/2024    2. Primary hypertension  -     amLODIPine Besylate; Take 1 tablet (5 mg total) by mouth every morning.  Dispense: 90 tablet; Refill: 3  -     Expanded, Low Complexity (80017)  -     Microalb/Creat Ratio, Random Urine;  Future; Expected date: 09/17/2024    3. Chronic gastritis without bleeding, unspecified gastritis type  -     Pantoprazole Sodium; Take 1 tablet (40 mg total) by mouth before breakfast.  Dispense: 90 tablet; Refill: 3  -     Expanded, Low Complexity (59603)  -     CBC With Differential With Platelet; Future; Expected date: 09/17/2024    4. Gastrointestinal hemorrhage, unspecified gastrointestinal hemorrhage type  -     Expanded, Low Complexity (35972)  -     CBC With Differential With Platelet; Future; Expected date: 09/17/2024    5. Vitamin D deficiency  -     Expanded, Low Complexity (68657)  -     Vitamin D; Future; Expected date: 09/17/2024    6. History of basal cell carcinoma  Overview:  Chest  Orders:  -     Expanded, Low Complexity (87303)  - Stable.    7. Psoriasis and similar disorder  -     Expanded, Low Complexity (84644)  - Stable.    8. Other iron deficiency anemia  -     Expanded, Low Complexity (09963)  -     Iron And Tibc; Future; Expected date: 09/17/2024    9. Colon cancer screening  - Repeat colonoscopy in 2029.    The patient indicates understanding of these issues and agrees to the plan.  Reinforced healthy diet, lifestyle, and exercise.      Return in about 1 year (around 9/17/2025) for Complete physical.     Fabricio Morales MD, 9/17/2024     Supplementary Documentation:   General Health:  In the past six months, have you lost more than 10 pounds without trying?: 2 - No  Has your appetite been poor?: No  Type of Diet: Balanced  How does the patient maintain a good energy level?: Appropriate Exercise  How would you describe your daily physical activity?: Moderate  How would you describe your current health state?: Good  How do you maintain positive mental well-being?: Social Interaction  On a scale of 0 to 10, with 0 being no pain and 10 being severe pain, what is your pain level?: 0 - (None)  In the past six months, have you experienced urine leakage?: 0-No  At any time do you feel concerned for  the safety/well-being of yourself and/or your children, in your home or elsewhere?: No  Have you had any immunizations at another office such as Influenza, Hepatitis B, Tetanus, or Pneumococcal?: No    Health Maintenance   Topic Date Due    MA Annual Health Assessment  01/01/2024    Pneumococcal Vaccine: 65+ Years (1 of 1 - PCV) 10/17/2024 (Originally 12/6/2012)    Zoster Vaccines (1 of 2) 10/17/2024 (Originally 12/6/1997)    COVID-19 Vaccine (4 - 2023-24 season) 10/17/2024 (Originally 9/1/2024)    Influenza Vaccine (1) 10/01/2024    Colorectal Cancer Screening  05/11/2029    Annual Depression Screening  Completed    Fall Risk Screening (Annual)  Completed

## 2024-09-17 NOTE — PATIENT INSTRUCTIONS
Prevention Guidelines, Men Ages 65 and Older  Screening tests and vaccines are an important part of managing your health.A screening test is done to find possible disorders or diseases in people who don't have any symptoms. The goal is to find a disease early so lifestyle changes can be made and you can be watched more closely to reduce the risk of disease, or to detect it early enough to treat it most effectively. Screening tests are not considered diagnostic, but are used to determine if more testing is needed.  Health counseling is essential, too. Below are guidelines for these, for men ages 65 and older. Talk with your healthcare provider to make sure you’re up-to-date on what you need.  Screening Who needs it How often   Abdominal aortic aneurysm Men ages 65 to 75 who have ever smoked 1 ultrasound   Alcohol misuse All men in this age group At routine exams   Blood pressure All men in this age group Yearly checkup if your blood pressure is normal  Normal blood pressure is less than 120/80 mm Hg  If your blood pressure reading is higher than normal, follow the advice of your healthcare provider   Colorectal cancer All men in this age group Flexible sigmoidoscopy every 5 years, or colonoscopy every 10 years, or double-contrast barium enema every 5 years; yearly fecal occult blood test or fecal immunochemical test; or a stool DNA test as often as your healthcare provider advises; talk with your healthcare provider about which tests are best for you and when you no longer need colonoscopies (generally after age 75)   Depression All men in this age group At routine exams   Type 2 diabetes or prediabetes All men beginning at age 45 and men without symptoms at any age who are overweight or obese and have 1 or more other risk factors for diabetes At least every 3 years (yearly if your blood sugar has already begun to rise)   Type 2 diabetes All men with prediabetes Every year   Hepatitis C Men at increased risk for  infection - talk with your healthcare provider At routine exams   High cholesterol or triglycerides All men in this age group At least every 5 years   HIV Men at increased risk for infection - talk with your healthcare provider At routine exams   Lung cancer Adults ages 55 to 80 who have smoked Yearly screening in smokers with 30 pack-year history of smoking or who quit within 15 years   Obesity All men in this age group At routine exams   Prostate cancer All men in this age group, talk to healthcare provider about risks and benefits of digital rectal exam (FERMÍN) and prostate-specific antigen (PSA) screening1 At routine exams   Syphilis Men at increased risk for infection - talk with your healthcare provider At routine exams   Tuberculosis Men at increased risk for infection - talk with your healthcare provider Ask your healthcare provider   Vision All men in this age group Every 1 to 2 years; if you have a chronic health condition, ask your healthcare provider if you needs exams more often   Vaccine Who needs it How often   Chickenpox (varicella) All men in this age group who have no record of this infection or vaccine 2 doses; second dose should be given at least 4 weeks after the first dose   Hepatitis A Men at increased risk for infection - talk with your healthcare provider 2 doses given at least 6 months apart   Hepatitis B Men at increased risk for infection - talk with your healthcare provider 3 doses over 6 months; second dose should be given 1 month after the first dose; the third dose should be given at least 2 months after the second dose and at least 4 months after the first dose   Haemophilus influenzae Type B (HIB) Men at increased risk for infection - talk with your healthcare provider 1 to 3 doses   Influenza (flu) All men in this age group  Once a year   Meningococcal Men at increased risk for infection - talk with your healthcare provider 1 or more doses   Pneumococcal conjugate vaccine (PCV13) and  pneumococcal polysaccharide vaccine (PPSV23) All men in this age group 1 dose of each vaccine   Tetanus/diphtheria/  pertussis (Td/Tdap) booster All men in this age group Td every 10 years, or Tdap if you will have contact with a child younger than 12 months old   Zoster All men in this age group 1 dose   Counseling Who needs it How often   Diet and exercise Men who are overweight or obese When diagnosed, and then at routine exams   Fall prevention (exercise, vitamin D supplements) All men in this age group At routine exams   Sexually transmitted infection Men at increased risk for infection - talk with your healthcare provider At routine exams   Use of daily aspirin Men ages 45 to 79 at risk for cardiovascular health problems At routine exams   Use of tobacco and the health effects it can cause All men in this age group Every visit   [address] [place] [address] [facility]   Date Last Reviewed: 2/1/2017  © 7886-3404 [employer]. [address] [place] [address]. All rights reserved. This information is not intended as a substitute for professional medical care. Always follow your healthcare professional's instructions.

## 2024-09-25 ENCOUNTER — TELEPHONE (OUTPATIENT)
Dept: INTERNAL MEDICINE CLINIC | Facility: CLINIC | Age: 79
End: 2024-09-25

## 2024-09-26 PROBLEM — Z87.19 HISTORY OF GI BLEED: Status: ACTIVE | Noted: 2024-09-26

## 2024-09-26 PROBLEM — K92.2 GASTROINTESTINAL HEMORRHAGE, UNSPECIFIED GASTROINTESTINAL HEMORRHAGE TYPE: Status: RESOLVED | Noted: 2024-05-10 | Resolved: 2024-09-26

## 2024-09-30 DIAGNOSIS — D50.9 IRON DEFICIENCY ANEMIA, UNSPECIFIED IRON DEFICIENCY ANEMIA TYPE: Primary | ICD-10-CM

## 2024-11-02 ENCOUNTER — APPOINTMENT (OUTPATIENT)
Dept: CT IMAGING | Facility: HOSPITAL | Age: 79
End: 2024-11-02
Attending: EMERGENCY MEDICINE
Payer: MEDICARE

## 2024-11-02 ENCOUNTER — HOSPITAL ENCOUNTER (EMERGENCY)
Facility: HOSPITAL | Age: 79
Discharge: HOME | End: 2024-11-02
Attending: EMERGENCY MEDICINE
Payer: MEDICARE

## 2024-11-02 VITALS
SYSTOLIC BLOOD PRESSURE: 127 MMHG | TEMPERATURE: 97 F | OXYGEN SATURATION: 95 % | HEART RATE: 52 BPM | DIASTOLIC BLOOD PRESSURE: 74 MMHG | RESPIRATION RATE: 16 BRPM

## 2024-11-02 DIAGNOSIS — K44.9 HIATAL HERNIA: ICD-10-CM

## 2024-11-02 DIAGNOSIS — R10.84 ABDOMINAL PAIN, GENERALIZED: Primary | ICD-10-CM

## 2024-11-02 DIAGNOSIS — K52.9 CHRONIC INFLAMMATION OF COLON: ICD-10-CM

## 2024-11-02 DIAGNOSIS — K59.00 CONSTIPATION, UNSPECIFIED CONSTIPATION TYPE: ICD-10-CM

## 2024-11-02 LAB
ALBUMIN SERPL-MCNC: 4.7 G/DL (ref 3.2–4.8)
ALBUMIN/GLOB SERPL: 1.7 {RATIO} (ref 1–2)
ALP LIVER SERPL-CCNC: 176 U/L
ALT SERPL-CCNC: 19 U/L
ANION GAP SERPL CALC-SCNC: 8 MMOL/L (ref 0–18)
AST SERPL-CCNC: 31 U/L (ref ?–34)
BASOPHILS # BLD AUTO: 0.1 X10(3) UL (ref 0–0.2)
BASOPHILS NFR BLD AUTO: 0.7 %
BILIRUB SERPL-MCNC: 0.8 MG/DL (ref 0.2–1.1)
BILIRUB UR QL: NEGATIVE
BUN BLD-MCNC: 14 MG/DL (ref 9–23)
BUN/CREAT SERPL: 16.1 (ref 10–20)
CALCIUM BLD-MCNC: 9.8 MG/DL (ref 8.7–10.4)
CHLORIDE SERPL-SCNC: 105 MMOL/L (ref 98–112)
CLARITY UR: CLEAR
CO2 SERPL-SCNC: 27 MMOL/L (ref 21–32)
CREAT BLD-MCNC: 0.87 MG/DL
DEPRECATED RDW RBC AUTO: 53.3 FL (ref 35.1–46.3)
EGFRCR SERPLBLD CKD-EPI 2021: 89 ML/MIN/1.73M2 (ref 60–?)
EOSINOPHIL # BLD AUTO: 1.77 X10(3) UL (ref 0–0.7)
EOSINOPHIL NFR BLD AUTO: 11.6 %
ERYTHROCYTE [DISTWIDTH] IN BLOOD BY AUTOMATED COUNT: 22.5 % (ref 11–15)
GLOBULIN PLAS-MCNC: 2.7 G/DL (ref 2–3.5)
GLUCOSE BLD-MCNC: 123 MG/DL (ref 70–99)
GLUCOSE UR-MCNC: NORMAL MG/DL
HCT VFR BLD AUTO: 37.3 %
HGB BLD-MCNC: 10.9 G/DL
IMM GRANULOCYTES # BLD AUTO: 0.04 X10(3) UL (ref 0–1)
IMM GRANULOCYTES NFR BLD: 0.3 %
KETONES UR-MCNC: NEGATIVE MG/DL
LACTATE SERPL-SCNC: 1.7 MMOL/L (ref 0.5–2)
LEUKOCYTE ESTERASE UR QL STRIP.AUTO: NEGATIVE
LIPASE SERPL-CCNC: 38 U/L (ref 12–53)
LYMPHOCYTES # BLD AUTO: 1.34 X10(3) UL (ref 1–4)
LYMPHOCYTES NFR BLD AUTO: 8.8 %
MCH RBC QN AUTO: 20 PG (ref 26–34)
MCHC RBC AUTO-ENTMCNC: 29.2 G/DL (ref 31–37)
MCV RBC AUTO: 68.3 FL
MONOCYTES # BLD AUTO: 0.53 X10(3) UL (ref 0.1–1)
MONOCYTES NFR BLD AUTO: 3.5 %
NEUTROPHILS # BLD AUTO: 11.42 X10 (3) UL (ref 1.5–7.7)
NEUTROPHILS # BLD AUTO: 11.42 X10(3) UL (ref 1.5–7.7)
NEUTROPHILS NFR BLD AUTO: 75.1 %
NITRITE UR QL STRIP.AUTO: NEGATIVE
OSMOLALITY SERPL CALC.SUM OF ELEC: 292 MOSM/KG (ref 275–295)
PH UR: 7 [PH] (ref 5–8)
PLATELET # BLD AUTO: 467 10(3)UL (ref 150–450)
PLATELET MORPHOLOGY: NORMAL
POTASSIUM SERPL-SCNC: 3.8 MMOL/L (ref 3.5–5.1)
PROT SERPL-MCNC: 7.4 G/DL (ref 5.7–8.2)
PROT UR-MCNC: NEGATIVE MG/DL
RBC # BLD AUTO: 5.46 X10(6)UL
RBC #/AREA URNS AUTO: >10 /HPF
SODIUM SERPL-SCNC: 140 MMOL/L (ref 136–145)
SP GR UR STRIP: 1.01 (ref 1–1.03)
UROBILINOGEN UR STRIP-ACNC: NORMAL
WBC # BLD AUTO: 15.2 X10(3) UL (ref 4–11)

## 2024-11-02 PROCEDURE — 99284 EMERGENCY DEPT VISIT MOD MDM: CPT

## 2024-11-02 PROCEDURE — 99285 EMERGENCY DEPT VISIT HI MDM: CPT

## 2024-11-02 PROCEDURE — 85025 COMPLETE CBC W/AUTO DIFF WBC: CPT | Performed by: EMERGENCY MEDICINE

## 2024-11-02 PROCEDURE — 83605 ASSAY OF LACTIC ACID: CPT | Performed by: EMERGENCY MEDICINE

## 2024-11-02 PROCEDURE — 74177 CT ABD & PELVIS W/CONTRAST: CPT | Performed by: EMERGENCY MEDICINE

## 2024-11-02 PROCEDURE — 96375 TX/PRO/DX INJ NEW DRUG ADDON: CPT

## 2024-11-02 PROCEDURE — 81001 URINALYSIS AUTO W/SCOPE: CPT | Performed by: EMERGENCY MEDICINE

## 2024-11-02 PROCEDURE — 96374 THER/PROPH/DIAG INJ IV PUSH: CPT

## 2024-11-02 PROCEDURE — 96361 HYDRATE IV INFUSION ADD-ON: CPT

## 2024-11-02 PROCEDURE — 83690 ASSAY OF LIPASE: CPT | Performed by: EMERGENCY MEDICINE

## 2024-11-02 PROCEDURE — 80053 COMPREHEN METABOLIC PANEL: CPT | Performed by: EMERGENCY MEDICINE

## 2024-11-02 RX ORDER — ONDANSETRON 2 MG/ML
4 INJECTION INTRAMUSCULAR; INTRAVENOUS ONCE
Status: COMPLETED | OUTPATIENT
Start: 2024-11-02 | End: 2024-11-02

## 2024-11-02 RX ORDER — MORPHINE SULFATE 4 MG/ML
4 INJECTION, SOLUTION INTRAMUSCULAR; INTRAVENOUS ONCE
Status: COMPLETED | OUTPATIENT
Start: 2024-11-02 | End: 2024-11-02

## 2024-11-02 NOTE — ED PROVIDER NOTES
Patient Seen in: [facility] Emergency Department    History     Chief Complaint   Patient presents with    Abdomen/Flank Pain     Stated Complaint: abdominal pain    HPI    Patient is a pleasant 78M hx of GERD, HTN, s/p appendectomy complains of generalized abdominal pain that began around 10pm last night. Pt states ate a normal dinner between 4-5 pm.  States he then woke from sleep 10 PM with generalized diffuse abdominal pain, nonradiating, described as somewhat aching, 7 out of 10 in severity.  It is been constant since onset.  He denies any vomiting, diarrhea, bloody stool, dysuria, or hematuria.  States chronically has had issues with fully emptying his bladder at night but feels like he was able to fully urinate and empty his bladder tonight.  He states has occasionally had waves of nausea though denies nausea now.  No fevers.  Has never had any pain like this before.  States has a history of diverticulitis before but this feels different.      Past Medical History:    Actinic keratosis    Acute cervical radiculopathy    BCC (basal cell carcinoma of skin)    chest    BCC (basal cell carcinoma)    left nose    Esophageal reflux    Hearing impairment    Lipid screening    Mixed hyperlipidemia    Need for vaccination    Neoplasm of uncertain behavior of skin    Non-healing skin lesion of nose    Physical exam, annual    Psoriasis    Unspecified essential hypertension       Past Surgical History:   Procedure Laterality Date    Appendectomy  1959    Colonoscopy  10/2009    repeat in 2019    Colonoscopy N/A 7/3/2018    Procedure: COLONOSCOPY;  Surgeon: Alex Mcgee MD;  Location: [facility] ENDOSCOPY    Colonoscopy N/A 5/11/2024    Procedure: COLONOSCOPY;  Surgeon: Shari Preciado MD;  Location: [facility] ENDOSCOPY    Egd      Electrocardiogram, complete  02/17/2014    Scanned to media tab             Family History   Problem Relation Age of Onset    Diabetes Mother     Cancer Father          lung-smoker    Diabetes Sister        Social History     Socioeconomic History    Marital status:    Tobacco Use    Smoking status: Former     Current packs/day: 0.00     Types: Cigarettes     Quit date: 1972     Years since quittin.8    Smokeless tobacco: Never   Vaping Use    Vaping status: Never Used   Substance and Sexual Activity    Alcohol use: Yes     Alcohol/week: 2.0 standard drinks of alcohol     Types: 1 Cans of beer, 1 Standard drinks or equivalent per week     Comment: few times a month    Drug use: No    Sexual activity: Yes     Partners: Female   Other Topics Concern    Caffeine Concern Yes     Comment: Daily; 2 cups, coffee    Reaction to local anesthetic No     Social Drivers of Health     Food Insecurity: No Food Insecurity (5/10/2024)    Food Insecurity     Food Insecurity: Never true   Transportation Needs: No Transportation Needs (5/10/2024)    Transportation Needs     Lack of Transportation: No   Housing Stability: Low Risk  (5/10/2024)    Housing Stability     Housing Instability: No       Review of Systems    Positive for stated complaint: abdominal pain  Other systems are as noted in HPI.  Constitutional and vital signs reviewed.      All other systems reviewed and negative except as noted above.    PSFH elements reviewed from today and agreed except as otherwise stated in HPI.    Physical Exam     ED Triage Vitals [24 0204]   BP (!) 164/75   Pulse 54   Resp 18   Temp 97.3 °F (36.3 °C)   Temp src Temporal   SpO2 99 %   O2 Device None (Room air)       Current:/74   Pulse 52   Temp 97.3 °F (36.3 °C) (Temporal)   Resp 16   SpO2 95%   Pulse ox 99% on RA         Physical Exam  General Appearance: alert, no distress  Eyes: pupils equal and round no pallor or injection  ENT, Mouth: mucous membranes moist  Respiratory: there are no retractions, lungs are clear to auscultation  Cardiovascular: regular rate and rhythm  no MRG, +2 radial and dp pulses bilaterally    Gastrointestinal: generalized abdominal tenderness without palpable mass, rebound or guarding. Abdomen slightly distended. No pulsatile mass. Decreased bowel sounds. No cvat bilaterally.   : no testicular tenderness, no inguinal hernia bilaterally   Neurological: II-XII grossly intact  no focal deficits  Skin: warm and dry, no rashes.  Musculoskeletal: neck is supple non tender        Extremities are symmetrical, full range of motion  Psychiatric: patient is pleasant, there is no agitation          ED Course     Labs Reviewed   CBC WITH DIFFERENTIAL WITH PLATELET - Abnormal; Notable for the following components:       Result Value    WBC 15.2 (*)     HGB 10.9 (*)     HCT 37.3 (*)     MCV 68.3 (*)     MCH 20.0 (*)     MCHC 29.2 (*)     RDW-SD 53.3 (*)     RDW 22.5 (*)     .0 (*)     Neutrophil Absolute Prelim 11.42 (*)     Neutrophil Absolute 11.42 (*)     Eosinophil Absolute 1.77 (*)     All other components within normal limits   COMP METABOLIC PANEL (14) - Abnormal; Notable for the following components:    Glucose 123 (*)     Alkaline Phosphatase 176 (*)     All other components within normal limits   URINALYSIS WITH CULTURE REFLEX - Abnormal; Notable for the following components:    Blood Urine Trace (*)     RBC Urine >10 (*)     All other components within normal limits   RBC MORPHOLOGY SCAN - Abnormal; Notable for the following components:    RBC Morphology See morphology below (*)     Microcytosis 2+ (*)     Ovalocytes 2+ (*)     All other components within normal limits   LIPASE - Normal   LACTIC ACID, PLASMA - Normal   SCAN SLIDE          MDM      This patient presents with a few hours of generalized, constant abdominal pain. Exam is as above.     Differential diagnosis includes:    Upper GI pathology including gastritis, PUD, cholecystitis, choledocholithiasis, pancreatitis, biliary colic, SBO     Lower GI pathology including diverticulitis, intraabdominal abscess, volvulus      pathology  including pyelonephritis or UTI or less likely kidney stone     Low suspicion for acute mesenteric ischemia or ischemic colitis based on presentation and exam, considered but lower suspicion AAA/rupture or dissection     Plan: will obtain cbc, cmp, lipase, UA/Ucx, CTAP  for further disposition. Npo for now. IV NS bolus, zofran, morphine ordered.       Disposition and Plan     Clinical Impression:  1. Abdominal pain, generalized    2. Constipation, unspecified constipation type    3. Chronic inflammation of colon    4. Hiatal hernia         Disposition:  Discharge  11/2/2024  5:57 am    Follow-up:  Alex Mcgee MD  [address] [facility] [address]  [facility] [id number] [phone]    Schedule an appointment as soon as possible for a visit in 1 week(s)      Fabricio Morales MD  [address] [facility] [address] [phone]    Schedule an appointment as soon as possible for a visit in 2 day(s)      [facility] Emergency Department  [address] [facility] [id number] [phone]  Go to  If symptoms worsen, immediately    Hillary Monahan MD  [address] [facility] [address]  [facility] [address] [phone]    Schedule an appointment as soon as possible for a visit in 1 week(s)      We recommend that you schedule follow up care with a primary care provider within the next three months to obtain basic health screening including reassessment of your blood pressure.      Medications Prescribed:  Current Discharge Medication List              Supplementary Documentation:             Trini Porter DO  Attending Physician  Emergency Medicine

## 2024-11-02 NOTE — ED INITIAL ASSESSMENT (HPI)
78y M to ED via personal car with c/o abdominal pain. Patient reports that he developed generalized abdominal pain around 10pm last night. Pain has been constant, to entire abdomen. Frequent belching. Denies urinary or bowel issues. Patient has not vomited but has been nauseated.

## 2024-11-02 NOTE — DISCHARGE INSTRUCTIONS
Thank you for seeking care at [facility] Emergency [facility]  You have been seen and evaluated for abdominal pain and discomfort.    In the emergency department, you had labs, urine, and CT scan done     Your testing did not show severe findings, except as noted: incidental microscopic hematuria (blood in the urine), hiatal hernia, chronic appearing colon inflammation without acute diverticulitis, elevated white blood cell count (similar to prior)    Read all instructions provided.    Remember, your care process does not end after your visit today. Please follow-up with your doctor within 1-2 days for a follow-up visit to ensure your symptoms are improving, to see if you need any further evaluation/testing, or to evaluate for any alternate diagnoses. Return to the emergency department if you develop severe abdominal pain, severe nausea and vomiting to the point where you are unable to keep down fluids, if you develop chest pain or difficulty breathing, blood in your stool, dizziness or fainting, or if you develop any other new or concerning symptoms as these could be signs of more serious medical illness. Try to stay well hydrated.

## 2024-11-05 ENCOUNTER — PATIENT OUTREACH (OUTPATIENT)
Dept: CASE MANAGEMENT | Age: 79
End: 2024-11-05

## 2024-11-05 NOTE — PROGRESS NOTES
ED follow up.    Fabricio Morales MD  Internal Medicine  [address] [id number] [phone]    Patient does not wish to follow up.  Confirmed with patient.    Closing encounter.

## 2025-05-06 ENCOUNTER — TELEPHONE (OUTPATIENT)
Dept: INTERNAL MEDICINE CLINIC | Facility: CLINIC | Age: 80
End: 2025-05-06

## 2025-06-09 ENCOUNTER — NURSE TRIAGE (OUTPATIENT)
Dept: INTERNAL MEDICINE CLINIC | Facility: CLINIC | Age: 80
End: 2025-06-09

## 2025-06-09 ENCOUNTER — HOSPITAL ENCOUNTER (OUTPATIENT)
Dept: GENERAL RADIOLOGY | Age: 80
Discharge: HOME | End: 2025-06-09
Attending: NURSE PRACTITIONER
Payer: MEDICARE

## 2025-06-09 ENCOUNTER — OFFICE VISIT (OUTPATIENT)
Dept: INTERNAL MEDICINE CLINIC | Facility: CLINIC | Age: 80
End: 2025-06-09

## 2025-06-09 VITALS
WEIGHT: 178 LBS | DIASTOLIC BLOOD PRESSURE: 72 MMHG | HEART RATE: 55 BPM | HEIGHT: 70 IN | SYSTOLIC BLOOD PRESSURE: 132 MMHG | BODY MASS INDEX: 25.48 KG/M2

## 2025-06-09 DIAGNOSIS — M79.622 LEFT UPPER ARM PAIN: Primary | ICD-10-CM

## 2025-06-09 DIAGNOSIS — M79.622 LEFT UPPER ARM PAIN: ICD-10-CM

## 2025-06-09 PROCEDURE — 72050 X-RAY EXAM NECK SPINE 4/5VWS: CPT | Performed by: NURSE PRACTITIONER

## 2025-06-09 NOTE — TELEPHONE ENCOUNTER
Action Requested: Summary for Provider     []  Critical Lab, Recommendations Needed  [] Need Additional Advice  []   FYI    []   Need Orders  [] Need Medications Sent to Pharmacy  [x]  Other     SUMMARY: Patient calling, left upper arm pain for the last 2-3 weeks. Thinks it's tendonitis. No fever, lumps/bumps, swelling, redness, CP or SOB. Hurts to move it. If still, does not hurt at all.   Patient taking OTC's tylenol, advil and not helping much. Gradual onset.     Future Appointments   Date Time Provider Department Center   6/9/2025 11:40 AM Vidhi Zuniga APRN [id number]   6/17/2025 11:30 AM Fabricio Morales MD [facility]   9/16/2025  9:15 AM Fabricio Morales MD [facility]         Reason for call: Arm Pain  Onset: 2-3 weeks       Reason for Disposition   SEVERE pain (e.g., excruciating, unable to do any normal activities)    Protocols used: Arm Pain-A-OH

## 2025-06-09 NOTE — PROGRESS NOTES
Constantin Tabor Jr. is a 79 year old male.  HPI:   Pt is new to me. 78 y/o male with hx of HTN, vit D deficiency, hx of basal cell carcinoma, hx of gastritis, GI bleed, JERROD, psoriasis.   Here for left arm pain for 3 weeks.   Denies any weakness, redness, swelling, no insect bites, no injury. Reports upper arm muscle pain. Feels it is tender to touch.   No hx of clotting disorder, vicky any prolonged sedenatry position. Occasionally if touches neck pain will radiate down to the arm. Denies any neck pain, states when he moves his neck has no pain in the neck area but it will trigger pain in the upper arm muscle.   Denies any redness, swelling, CP, SOB, palpitations, vision changes, numbness or tingling, color or temp change of arm, weakness, not dropping things.   Current Medications[1]   Past Medical History[2]   Social History:  Short Social Hx on File[3]     REVIEW OF SYSTEMS:   Review of Systems   Constitutional:  Negative for activity change, appetite change, fatigue and unexpected weight change.   HENT:  Negative for congestion and dental problem.    Eyes:  Negative for discharge and visual disturbance.   Respiratory:  Negative for cough, chest tightness, shortness of breath and wheezing.    Cardiovascular:  Negative for chest pain, palpitations and leg swelling.   Gastrointestinal:  Negative for abdominal pain, constipation, diarrhea, nausea and vomiting.   Endocrine: Negative.    Genitourinary:  Negative for decreased urine volume, difficulty urinating and frequency.   Musculoskeletal:  Positive for myalgias. Negative for arthralgias, back pain, gait problem, joint swelling, neck pain and neck stiffness.   Skin:  Negative for color change, pallor and rash.   Neurological:  Negative for dizziness, tremors, seizures, weakness, light-headedness, numbness and headaches.   Hematological:  Does not bruise/bleed easily.   Psychiatric/Behavioral:  Negative for behavioral problems, dysphoric mood and suicidal ideas.            EXAM:   /72 (BP Location: Right arm, Patient Position: Sitting, Cuff Size: adult)   Pulse 55   Ht 5' 10\" (1.778 m)   Wt 178 lb (80.7 kg)   BMI 25.54 kg/m²     Physical Exam  Vitals reviewed.   Constitutional:       General: He is not in acute distress.     Appearance: He is not ill-appearing, toxic-appearing or diaphoretic.   Eyes:      General: No scleral icterus.     Conjunctiva/sclera: Conjunctivae normal.   Cardiovascular:      Rate and Rhythm: Normal rate and regular rhythm.      Pulses: Normal pulses.      Heart sounds: Normal heart sounds.   Pulmonary:      Effort: Pulmonary effort is normal.      Breath sounds: Normal breath sounds.   Musculoskeletal:         General: No swelling. Normal range of motion.      Left shoulder: Tenderness present. No swelling, deformity, effusion, laceration, bony tenderness or crepitus. Normal range of motion. Normal strength. Normal pulse.      Left upper arm: Tenderness present. No swelling, edema, deformity, lacerations or bony tenderness.      Left elbow: Normal.   Skin:     General: Skin is warm.      Coloration: Skin is not jaundiced or pale.      Findings: No bruising, erythema or rash.   Neurological:      General: No focal deficit present.      Mental Status: He is alert and oriented to person, place, and time.      Cranial Nerves: No cranial nerve deficit.      Sensory: No sensory deficit.      Motor: No weakness.   Psychiatric:         Thought Content: Thought content normal.         Judgment: Judgment normal.            ASSESSMENT AND PLAN:   Assessment & Plan     Left upper arm pain  -No prior injury.   -Will order US to r/o DVT.   -Pain reported when rotating neck though denies any neck or shoulder pain. Will include cervical flexion xrays  -Referral to physiatry.   -Reviewed alarm signs/when to go to ER  -Tylenol 500 mg every 8 hours PRN, Pending results of imaging will f/u with patient.     The patient indicates understanding of these issues and  agrees to the plan.  The patient is asked to return in 1 week with PCP.     The above note was creating using Dragon speech recognition technology. Please excuse any typos.         [1]   Current Outpatient Medications   Medication Sig Dispense Refill    pantoprazole 40 MG Oral Tab EC Take 1 tablet (40 mg total) by mouth before breakfast. 90 tablet 3    amLODIPine 5 MG Oral Tab Take 1 tablet (5 mg total) by mouth every morning. 90 tablet 3   [2]   Past Medical History:   Actinic keratosis    Acute cervical radiculopathy    BCC (basal cell carcinoma of skin)    chest    BCC (basal cell carcinoma)    left nose    Esophageal reflux    Hearing impairment    Lipid screening    Mixed hyperlipidemia    Need for vaccination    Neoplasm of uncertain behavior of skin    Non-healing skin lesion of nose    Physical exam, annual    Psoriasis    Unspecified essential hypertension   [3]   Social History  Socioeconomic History    Marital status:    Tobacco Use    Smoking status: Former     Current packs/day: 0.00     Types: Cigarettes     Quit date: 1972     Years since quittin.4    Smokeless tobacco: Never   Vaping Use    Vaping status: Never Used   Substance and Sexual Activity    Alcohol use: Yes     Alcohol/week: 2.0 standard drinks of alcohol     Types: 1 Cans of beer, 1 Standard drinks or equivalent per week     Comment: few times a month    Drug use: No    Sexual activity: Yes     Partners: Female   Other Topics Concern    Caffeine Concern Yes     Comment: Daily; 2 cups, coffee    Reaction to local anesthetic No     Social Drivers of Health     Food Insecurity: No Food Insecurity (5/10/2024)    Food Insecurity     Food Insecurity: Never true   Transportation Needs: No Transportation Needs (5/10/2024)    Transportation Needs     Lack of Transportation: No   Housing Stability: Low Risk  (5/10/2024)    Housing Stability     Housing Instability: No

## 2025-06-10 ENCOUNTER — HOSPITAL ENCOUNTER (OUTPATIENT)
Dept: ULTRASOUND IMAGING | Facility: HOSPITAL | Age: 80
Discharge: HOME | End: 2025-06-10
Attending: NURSE PRACTITIONER
Payer: MEDICARE

## 2025-06-10 DIAGNOSIS — M79.622 LEFT UPPER ARM PAIN: ICD-10-CM

## 2025-06-10 PROCEDURE — 93971 EXTREMITY STUDY: CPT | Performed by: NURSE PRACTITIONER

## 2025-06-12 ENCOUNTER — TELEPHONE (OUTPATIENT)
Dept: INTERNAL MEDICINE CLINIC | Facility: CLINIC | Age: 80
End: 2025-06-12

## 2025-06-12 NOTE — TELEPHONE ENCOUNTER
Wife calling for results  Advised US results and plan as per below  Advised no comments on xrays yet, although they are in.     Wife questions need for visit next week with Dr. Morales, advised per notes patient to follow up with PCP in one week. Appt will be kept for now.     Also encouraged to call and schedule with Physiatry as it may take time to get in and waiting could cause delay in visit. Offered to transfer but declined, she wants to call with patient to review schedule/calendar.     Future Appointments   Date Time Provider Department Center   6/17/2025 11:30 AM Fabricio Morales MD [facility]   9/16/2025  9:15 AM Fabricio Morales MD [facility]       Patient contacted (name and date of birth verified). Provider's results and recommendations reviewed with patient. Patient verbalizes understanding of the information, agrees with plan of care and offers no further questions at this time.   Vidhi Zuniga, APRN  6/10/2025  7:55 PM [facility]       Hello, please inform patient that his ultrasound is negative for DVT.  Likely this is a muscular issue and I would encourage him to schedule with the physiatrist as discussed for further evaluation and treatment.

## 2025-06-16 ENCOUNTER — TELEPHONE (OUTPATIENT)
Dept: INTERNAL MEDICINE CLINIC | Facility: CLINIC | Age: 80
End: 2025-06-16

## 2025-06-17 ENCOUNTER — OFFICE VISIT (OUTPATIENT)
Dept: INTERNAL MEDICINE CLINIC | Facility: CLINIC | Age: 80
End: 2025-06-17

## 2025-06-17 VITALS
WEIGHT: 176.38 LBS | DIASTOLIC BLOOD PRESSURE: 78 MMHG | HEIGHT: 70 IN | OXYGEN SATURATION: 98 % | TEMPERATURE: 97 F | BODY MASS INDEX: 25.25 KG/M2 | SYSTOLIC BLOOD PRESSURE: 114 MMHG | HEART RATE: 74 BPM

## 2025-06-17 DIAGNOSIS — M79.622 LEFT UPPER ARM PAIN: Primary | ICD-10-CM

## 2025-06-17 PROCEDURE — 3074F SYST BP LT 130 MM HG: CPT | Performed by: INTERNAL MEDICINE

## 2025-06-17 PROCEDURE — 1159F MED LIST DOCD IN RCRD: CPT | Performed by: INTERNAL MEDICINE

## 2025-06-17 PROCEDURE — 99214 OFFICE O/P EST MOD 30 MIN: CPT | Performed by: INTERNAL MEDICINE

## 2025-06-17 PROCEDURE — 3078F DIAST BP <80 MM HG: CPT | Performed by: INTERNAL MEDICINE

## 2025-06-17 PROCEDURE — 1160F RVW MEDS BY RX/DR IN RCRD: CPT | Performed by: INTERNAL MEDICINE

## 2025-06-17 PROCEDURE — 1125F AMNT PAIN NOTED PAIN PRSNT: CPT | Performed by: INTERNAL MEDICINE

## 2025-06-17 PROCEDURE — 3008F BODY MASS INDEX DOCD: CPT | Performed by: INTERNAL MEDICINE

## 2025-06-17 PROCEDURE — G2211 COMPLEX E/M VISIT ADD ON: HCPCS | Performed by: INTERNAL MEDICINE

## 2025-06-17 RX ORDER — MELOXICAM 7.5 MG/1
7.5 TABLET ORAL DAILY
Qty: 30 TABLET | Refills: 0 | Status: SHIPPED | OUTPATIENT
Start: 2025-06-17

## 2025-06-17 NOTE — PATIENT INSTRUCTIONS
Exercises for Shoulder Flexibility: External Rotation    This stretch can help restore shoulder flexibility and relieve pain over time. When stretching, be sure to breathe deeply. Follow any special instructions from your healthcare provider or physical therapist:   a doorway. Grasp the doorjamb with the hand on the frozen side. Your arm should be bent.  With the other hand, hold the elbow on the side with the involved frozen (stiff) shoulder firmly against your body.  Standing in the same spot, rotate your body away from the doorjamb. Stop when you feel the stretch in the shoulder. At first, try to hold the stretch for 5 seconds.  Work up to doing 3 sets of this stretch, 3 times a day. Work up to holding the stretch for 30 to 60 seconds.  Note: Keep your arms as still as you can. Over time, rotate your body a little more to enhance the stretch. But be careful not to twist your back.  Frozen shoulder is another name for adhesive capsulitis, which causes restricted movement in the shoulder. If you have frozen shoulder, this stretch may cause discomfort, especially when you first get started. A few months may pass before you achieve the results you want. But once your shoulder heals, it rarely becomes frozen again. So stick to your stretching program. If you have any questions, be sure to ask your healthcare provider.  [employer] last reviewed this educational content on 3/1/2018  © 2265-6369 [employer]. All rights reserved. This information is not intended as a substitute for professional medical care. Always follow your healthcare professional's instructions.        Exercises for Shoulder Flexibility: Internal Rotation    This stretch can help restore shoulder flexibility and relieve pain over time. When stretching, be sure to breathe deeply. Follow any special instructions from your healthcare provider or physical therapist.  While seated, move the arm on the side you want to stretch toward the  middle of your back. The palm of your hand should face out.  Cup your other hand under the hand that’s behind your back. Gently push your cupped hand upward until you feel the stretch in the shoulder. Try to hold the stretch for 5 seconds.  Work up to doing 3 sets of this stretch, 3 times a day. Work up to holding the stretch for 30 to 60 seconds.  Note: Keep your back straight. It’s OK if your hand can’t reach the middle of your back. Instead, start the stretch with your hand as close as you can get it to the middle of your back.  Frozen shoulder  Frozen shoulder is another name for adhesive capsulitis. This causes restricted movement in the shoulder. If you have frozen shoulder, this stretch may cause discomfort, especially when you first get started. A few months may pass before you achieve the results you want. But once your shoulder heals, it rarely becomes frozen again. So stick to your stretching program. If you have any questions, be sure to ask your healthcare provider.   [employer] last reviewed this educational content on 12/1/2017 © 2000-2020 [employer]. All rights reserved. This information is not intended as a substitute for professional medical care. Always follow your healthcare professional's instructions.        Exercises for Shoulder Flexibility: Adduction (Reaching Across)    This stretch can help restore shoulder flexibility and relieve pain over time. When stretching, be sure to breathe deeply. And follow any special instructions from your doctor or physical therapist:  Put the hand from the side you want to stretch on your opposite shoulder. Your elbow should point away from your body. Try to raise your elbow as close to shoulder height as you can.  With your other hand, push the raised elbow toward the opposite shoulder. Avoid turning your head. Stop when you feel the stretch. Try to hold the stretch for 5 seconds.  Work up to doing 3 sets of this stretch, 3 times a day. Work up  to holding the stretch for 30 to 60 seconds.  Note: Be sure to push your elbow across your chest, not up toward your chin. Over time, try to push your elbow farther across your chest to enhance the stretch.  Frozen shoulder is another name for adhesive capsulitis, which causes restricted movement in the shoulder. If you have frozen shoulder, this stretch may cause discomfort, especially when you first get started. A few months may pass before you achieve the results you want. Once your shoulder heals, it rarely becomes frozen again. So stick to your stretching program. If you have any questions, be sure to ask your doctor.  [employer] last reviewed this educational content on 3/1/2018  © 6579-3478 [employer]. All rights reserved. This information is not intended as a substitute for professional medical care. Always follow your healthcare professional's instructions.        Exercises for Shoulder Flexibility: Back Scratch    Improving your flexibility can reduce pain. Stretching exercises also can help increase your range of pain-free motion. Breathe normally when you exercise. Try to use smooth, fluid movements. Never force a stretch.  Note: Follow any special instructions you are given. If you feel pain, stop the exercise. If the pain continues after stopping, call your healthcare provider.  Stand straight, placing the back of your hand on the side you want to stretch flat against your lower back.  Throw one end of a towel over your shoulder. Grab it behind your back with your other hand.  Pull down gently on the towel with your front arm. Let your back arm slide up as high as is comfortable. You’ll feel a stretch in your shoulder. Hold the stretch for a few seconds.  Repeat 3 to 5 times. Build up to holding each stretch for 30 to 60 seconds.  For your safety, check with your healthcare provider before starting an exercise program.  [employer] last reviewed this educational content on 3/1/2018  ©  1260-8097 [employer]. All rights reserved. This information is not intended as a substitute for professional medical care. Always follow your healthcare professional's instructions.        Exercises for Shoulder Flexibility: Wall Walk    Improving your flexibility can reduce pain. Stretching exercises also can help increase your range of pain-free motion. Breathe normally when you exercise. Use smooth, fluid movements.  Note: Follow any special instructions you are given. If you feel pain, stop the exercise. If the pain continues after stopping, call your healthcare provider:  Stand with your shoulder about 2 feet from the wall.  Raise your arm to shoulder level and gently “walk” your fingers up the wall as high as you can.  Hold for a few seconds. Then walk your fingers back down.  Repeat 3 times. Move closer to the wall as you repeat.  Build up to holding each stretch for 30 seconds.  Caution: Do this stretch only if your healthcare provider recommends it. Don’t do it when you are first injured.  [employer] last reviewed this educational content on 3/1/2018  © 1081-9701 [employer]. All rights reserved. This information is not intended as a substitute for professional medical care. Always follow your healthcare professional's instructions.

## 2025-06-17 NOTE — PROGRESS NOTES
The following individual(s) verbally consented to be recorded using ambient AI listening technology and understand that they can each withdraw their consent to this listening technology at any point by asking the clinician to turn off or pause the recording:    Patient name: Constantin Tabor Jr. YES  Additional names:  Samantha Tabor

## 2025-06-17 NOTE — PROGRESS NOTES
Patient ID: Constantin Tabor Jr. is a 79 year old male.  Chief Complaint   Patient presents with    Shoulder Pain     left shoulder and uppper arm soreness  Pt wants to discuss US and RX results DOS 06/2025         HISTORY OF PRESENT ILLNESS:   HPI  History of Present Illness  Constantin Tabor Jr. is a 79 year old male with cervical degenerative disc disease who presents with left arm and shoulder pain.    He has been experiencing left arm and shoulder pain for the past three to four weeks, located in the back of the shoulder and sometimes radiating down the arm. He is uncertain if the pain originates from the shoulder or another location. He has difficulty moving his arm in certain ways, such as getting dressed, but can perform other tasks without issue. Certain movements do not exacerbate the pain and sometimes even feel better.    He is a side sleeper, predominantly on his left side, which may contribute to his symptoms. He has a history of acid reflux, which affects his sleeping position, as he cannot lay on his back unless elevated, leading to neck discomfort and headaches.    A prior workup included an x-ray of the neck and an ultrasound, both of which were completed. No specific event has been identified that triggered the onset of his symptoms.    He is a right-handed individual who has been exposed to jet noise for 35 years, affecting his hearing.      Review of Systems  Ten point review of systems otherwise negative with the exception of HPI and assessment and plan.    MEDICAL HISTORY:   Past Medical History[1]    Past Surgical History[2]    Medications - Current[3]    Allergies:Allergies[4]    Social History     Socioeconomic History    Marital status:      Spouse name: Not on file    Number of children: Not on file    Years of education: Not on file    Highest education level: Not on file   Occupational History    Not on file   Tobacco Use    Smoking status: Former     Current packs/day: 0.00     Types:  Cigarettes     Quit date: 1972     Years since quittin.4    Smokeless tobacco: Never   Vaping Use    Vaping status: Never Used   Substance and Sexual Activity    Alcohol use: Yes     Alcohol/week: 2.0 standard drinks of alcohol     Types: 1 Cans of beer, 1 Standard drinks or equivalent per week     Comment: few times a month    Drug use: No    Sexual activity: Yes     Partners: Female   Other Topics Concern     Service Not Asked    Blood Transfusions Not Asked    Caffeine Concern Yes     Comment: Daily; 2 cups, coffee    Occupational Exposure Not Asked    Hobby Hazards Not Asked    Sleep Concern Not Asked    Stress Concern Not Asked    Weight Concern Not Asked    Special Diet Not Asked    Back Care Not Asked    Exercise Not Asked    Bike Helmet Not Asked    Seat Belt Not Asked    Self-Exams Not Asked    Grew up on a farm Not Asked    History of tanning Not Asked    Outdoor occupation Not Asked    Reaction to local anesthetic No   Social History Narrative    Not on file     Social Drivers of Health     Food Insecurity: No Food Insecurity (5/10/2024)    Food Insecurity     Food Insecurity: Never true   Transportation Needs: No Transportation Needs (5/10/2024)    Transportation Needs     Lack of Transportation: No     Car Seat: Not on file   Stress: Not on file   Housing Stability: Low Risk  (5/10/2024)    Housing Stability     Housing Instability: No     Housing Instability Emergency: Not on file     Crib or Bassinette: Not on file           PHYSICAL EXAM:     Vitals:    25 1108   BP: 114/78   Pulse: 74   Temp: 97.1 °F (36.2 °C)   SpO2: 98%   Weight: 176 lb 6.4 oz (80 kg)   Height: 5' 10\" (1.778 m)       Body mass index is 25.31 kg/m².    Physical Exam  Constitutional:       Appearance: Normal appearance.   Eyes:      General: No scleral icterus.  Cardiovascular:      Rate and Rhythm: Normal rate and regular rhythm.      Pulses: Normal pulses.      Heart sounds: Normal heart sounds. No murmur  heard.  Pulmonary:      Effort: Pulmonary effort is normal. No respiratory distress.      Breath sounds: Normal breath sounds. No stridor. No wheezing, rhonchi or rales.   Musculoskeletal:      Left shoulder: Tenderness present. Decreased range of motion.      Cervical back: Rigidity and tenderness present. Pain with movement present.   Neurological:      Mental Status: He is alert.           ASSESSMENT/PLAN:   1. Left upper arm pain  Neck x-ray reviewed.  Ultrasound did not show DVT.  Keep upcoming appointment with physiatry for the time being.  Physical Therapy Referral - [facility]  Meloxicam 7.5 MG Oral Tab; Take 1 tablet (7.5 mg total) by mouth daily.  Dispense: 30 tablet; Refill: 0  Do not take other NSAIDs while taking meloxicam.  Instructed he can take acetaminophen.  Several home exercises given.    Return if symptoms worsen or fail to improve.    This note was prepared using Dragon Medical voice recognition dictation software. As a result errors may occur. When identified these errors have been corrected. While every attempt is made to correct errors during dictation discrepancies may still exist.    Fabricio Morales MD  6/17/2025       [1]   Past Medical History:   Actinic keratosis    Acute cervical radiculopathy    BCC (basal cell carcinoma of skin)    chest    BCC (basal cell carcinoma)    left nose    Esophageal reflux    Hearing impairment    Lipid screening    Mixed hyperlipidemia    Need for vaccination    Neoplasm of uncertain behavior of skin    Non-healing skin lesion of nose    Physical exam, annual    Psoriasis    Unspecified essential hypertension   [2]   Past Surgical History:  Procedure Laterality Date    Appendectomy  1959    Colonoscopy  10/2009    repeat in 2019    Colonoscopy N/A 7/3/2018    Procedure: COLONOSCOPY;  Surgeon: Alex Mcgee MD;  Location[facility] ENDOSCOPY    Colonoscopy N/A 5/11/2024    Procedure: COLONOSCOPY;  Surgeon: Shari Preciado MD;   Location: [facility] ENDOSCOPY    Egd      Electrocardiogram, complete  02/17/2014    Scanned to media tab    [3]   Current Outpatient Medications:     Meloxicam 7.5 MG Oral Tab, Take 1 tablet (7.5 mg total) by mouth daily., Disp: 30 tablet, Rfl: 0    pantoprazole 40 MG Oral Tab EC, Take 1 tablet (40 mg total) by mouth before breakfast., Disp: 90 tablet, Rfl: 3    amLODIPine 5 MG Oral Tab, Take 1 tablet (5 mg total) by mouth every morning., Disp: 90 tablet, Rfl: 3  [4] No Known Allergies

## 2025-06-23 ENCOUNTER — TELEPHONE (OUTPATIENT)
Dept: PHYSICAL THERAPY | Facility: HOSPITAL | Age: 80
End: 2025-06-23

## 2025-06-24 ENCOUNTER — OFFICE VISIT (OUTPATIENT)
Dept: PHYSICAL THERAPY | Age: 80
End: 2025-06-24
Attending: INTERNAL MEDICINE
Payer: MEDICARE

## 2025-06-24 DIAGNOSIS — M79.622 LEFT UPPER ARM PAIN: Primary | ICD-10-CM

## 2025-06-24 PROCEDURE — 97161 PT EVAL LOW COMPLEX 20 MIN: CPT | Performed by: PHYSICAL THERAPIST

## 2025-06-24 PROCEDURE — 97110 THERAPEUTIC EXERCISES: CPT | Performed by: PHYSICAL THERAPIST

## 2025-06-24 NOTE — PROGRESS NOTES
UPPER EXTREMITY EVALUATION:     Diagnosis:   Left upper arm pain (M79.622) Patient:  Constantin Tabor Jr. (79 year old, male)        Referring Provider: Fabricio Morales  Today's Date   6/24/2025    Precautions:      Date of Evaluation: 06/24/25  Next MD visit: No data recorded  Date of Surgery: No data recorded     PATIENT SUMMARY     Summary of chief complaints: L bicep pain  History of current condition: Pt reports that about 3 weeks ago he started to get bicep pain. Pt was unable to lift arm and sensitive to touch, but it has gotten better. Pt reports MD took x-rays and was given meds. Pt reports the meds have been helping. Pt denies C-spine pain.   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  Significant findings include:    Imaging/Tests:     Constantin  has a past medical history of Actinic keratosis (9/25/2018), Acute cervical radiculopathy (12/29/2020), BCC (basal cell carcinoma of skin) (09/2018), BCC (basal cell carcinoma) (2018), Esophageal reflux, Hearing impairment, Lipid screening (02/17/2014), Mixed hyperlipidemia (3/3/2017), Need for vaccination (12/10/2019), Neoplasm of uncertain behavior of skin (9/25/2018), Non-healing skin lesion of nose (1/23/2018), Physical exam, annual (1/23/2018), Psoriasis, and Unspecified essential hypertension.  He  has a past surgical history that includes electrocardiogram, complete (02/17/2014); appendectomy (1959); colonoscopy (10/2009); egd; colonoscopy (N/A, 7/3/2018); and colonoscopy (N/A, 5/11/2024).    ASSESSMENT  Constantin presents to physical therapy  evaluation with primary c/o L bicep pain. The results of the objective tests and measures show decreased B shoulder AROM, good but painful strength and response to C-spine exercises. Functional deficits include but are not limited to OH reaching, sensitive to touch, reaching behind back. Signs and symptoms are consistent with diagnosis of Left upper arm pain (M79.622). Pt and PT discussed evaluation findings, pathology, POC and HEP.  Pt voiced understanding and performs HEP correctly without reported pain. Skilled Physical Therapy is medically necessary to address the above impairments and reach functional goals.    OBJECTIVE:      Musculoskeletal  Observation/Posture: forward head posture  Palpation: light touch sensitive,   Accessory motion:      Special Tests:       ROM and Strength (* denotes performed with pain)     Cervical ROM MMT (-/5)     Flex 100%       Ext 100%      R L R L     Side bend 75% 75%         Rotation 100% 100%       ,   Shoulder   ROM MMT (-/5)    R L R L     Flex 120 130* 5/5 5/5     Ext 65 70*         Abd (C5) 110 160 4/5 5/5     IR L5 T11 5/5 5/5*     ER 30 80 5/5 5/5*     Low Trap n/a         Mid Trap n/a         SA n/a         ,   Elbow   ROM MMT (-/5)    R L R L     Flex (C6) 130 128 5/5 5/5     Ext (C7) 0 0* PDM 5/5 4/5*     Pronation     5/5 5/5     Supination     5/5 5/5*   ,   Wrist   ROM MMT (-/5)    R L R L     Flex (C7)       5/5*     Ext (C6)       5/5*     Ulnar Dev             Radial Dev             Thumb Ext (C8)             Digit Flex (C8) n/a         Interossei (T1) n/a          (lbs) n/a         Pinch (lbs) n/a           Flexibility:    UE Flexibility R L     Upper Trap         Levator Scap         Pec Major         Scalenes         Latissimus         Bicep         LE Flexibility Other R L              Neurological:  Sensation: all equal      Today's Treatment and Response:   Pt education was provided on exam findings, treatment diagnosis, treatment plan, expectations,  and prognosis.    Today's Treatment       6/24/2025   UE Treatment   Therapeutic Exercise Seated C-spine retraction x 15 ( NE, flexion 130*, elbow ext 5/5*)  Seated C-spine retraction with self OP x15 (NE, flexion 135*(less pain, ) eblow ext 5/5*(less pain) x15   Therapeutic Exercise Minutes 10   Evaluation Minutes 20   Total Time Of Timed Procedures 10   Total Time Of Service-Based Procedures 20   Total Treatment Time 30   HEP Seated C-spine retraction with self OP 15 reps 3-4 times per day; verbal pt declined handout   Discussion of appropriate pain response        Patient was instructed in and issued a HEP for: Seated C-spine retraction with self OP 15 reps 3-4 times per day; verbal pt declined handout   Discussion of appropriate pain response    Charges:  PT EVAL: Low Complexity, 1 eval, 1 therex  In agreement with evaluation findings and clinical rationale, this evaluation involved LOW COMPLEXITY decision making due to no personal factors/comorbidities, 1-2 body structures involved/activity limitations, and stable symptoms as documented in the evaluation.                                                          PLAN OF CARE:      Goals: (to be met in 8 visits)   1. Pt to be independent in their home exercise program, its’ progression, and management of their symptoms.  2. Pt to demonstrate and be able to verbalize strategies to aid static postural control.  3. Pt to increase B UE strength to 5/5 without pain in order to put away items in OH cabinets  4. Pt to report 50% or more reduction in symptoms while performing ADLs.       Frequency / Duration: Patient will be seen 1-2x/week or a total of 8  visits over a 90 day period. Treatment will include: Manual Therapy; Mechanical Traction; Neuromuscular Re-education; Self-Care Home Management; Therapeutic Activities; Therapeutic Exercise; Home Exercise Program instruction    Education or treatment limitation: None   Rehab Potential: good     QuickDASH Outcome  Score  Score: (Patient-Rptd) 25 % (6/18/2025  4:07 PM)      Patient/Family/Caregiver was advised of these findings, precautions, and treatment options and has agreed to actively participate in planning and for this course of care.    Thank you for your referral. Please co-sign or sign and return this letter via fax as soon as possible to [phone]. If you have any questions, please contact me at Dept: [phone]    Sincerely,  Electronically signed by therapist: Elvira Garcia PT  Physician's certification required: Yes  I certify the need for these services furnished under this plan of treatment and while under my care.    X___________________________________________________ Date____________________    Certification From: 6/24/2025  To: 9/22/2025

## 2025-06-30 ENCOUNTER — OFFICE VISIT (OUTPATIENT)
Dept: PHYSICAL THERAPY | Age: 80
End: 2025-06-30
Attending: INTERNAL MEDICINE
Payer: MEDICARE

## 2025-06-30 PROCEDURE — 97110 THERAPEUTIC EXERCISES: CPT | Performed by: PHYSICAL THERAPIST

## 2025-06-30 PROCEDURE — 97035 APP MDLTY 1+ULTRASOUND EA 15: CPT | Performed by: PHYSICAL THERAPIST

## 2025-06-30 NOTE — PROGRESS NOTES
Patient: Constantin Tabor Jr. (79 year old, male) Referring Provider:  Insurance:   Diagnosis: Left upper arm pain (M79.622) Fabricio Morales  [facility] MEDICARE   Date of Surgery: n/a Next MD visit:  N/A   Precautions:  -- (HTN) No data recorded Referral Information:    Date of Evaluation: Req: 8, Auth: 8, Exp: 9/16/2025 06/24/25 POC Auth Visits:          Today's Date   6/30/2025    Subjective  L UE hurts when I move it. Deny any change in c/o with HEP and with medication. L UE c/o provoked with ER MMT (4/5 with c/o).       Pain: 0/10     Objective  L UE c/o unaffected by cervical spine AROM.  L UE/biceps provoked with shoulder AROM, ER MMT, palpation lateral upper L UE. Restrictions shoulder AROM > PROM.         Assessment  Reviewed and modified HEP. Pt's L lateral UE c/o seem related to rotator cuff, lateral deltoid pain.    Goals (to be met in 8 visits)     1. Pt to be independent in their home exercise program, its’ progression, and management of their symptoms.  2. Pt to demonstrate and be able to verbalize strategies to aid static postural control.  3. Pt to increase B UE strength to 5/5 without pain in order to put away items in OH cabinets  4. Pt to report 50% or more reduction in symptoms while performing ADLs.     Plan  Continue PT 1-2 x weekly for 8 visits. F/u on HEP modifications. Progress shoulder P/AAROM, rotator cuff/periscapular strengthening. F/u on response to US.    Treatment Last 4 Visits  Treatment Day: 2       6/24/2025 6/30/2025   UE Treatment   Therapeutic Exercise Seated C-spine retraction x 15 ( NE, flexion 130*, elbow ext 5/5*)  Seated C-spine retraction with self OP x15 (NE, flexion 135*(less pain, ) eblow ext 5/5*(less pain) x15 Reviewed cervical spine retraction, cuing form x 10 reps (HEP)    Standing B shoulder ER x 5 reps    Shoulder ER using YTB x 10 reps    S/L Shoulder ER 0# x 10 reps (HEP)    Shoulder flexion AAROM supine x 5 reps - pt c/o, stopped (HEP)    Supine shoulder ER  ROM/hand behind head stretch 2 x 15 sec - c/o, stopped    Seated L shoulder flexion AAROM/table slides x 5 reps  - pt c/o, stopped    B row using blue tubing x 30 reps    Standing L shoulder IR using YTB x 20 resp     Seated shoulder pulleys: flex, abd x 10 reps each    Hep instruction/handout; see below.    Manual Therapy  L GH post and inf  glides grade II    PROM L shoulder flex, abd, IR, ER   Modalities  Ultrasound: L lateral UE 50%, 1.5 W/cm2, 3.3 MHz x 8 min   Therapeutic Exercise Minutes 10 30   Manual Therapy Minutes  5   Ultrasound Minutes  8   Evaluation Minutes 20    Total Time Of Timed Procedures 10 43   Total Time Of Service-Based Procedures 20 0   Total Treatment Time 30 43   HEP Seated C-spine retraction with self OP 15 reps 3-4 times per day; verbal pt declined handout   Discussion of appropriate pain response Cerivcal spine retraction, s/l shoulder ER, supine shoulder AAROM flexion; ice neelima        HEP  Cerivcal spine retraction, s/l shoulder ER, supine shoulder AAROM flexion; ice neelima    Charges  2 TE,  1 US

## 2025-07-02 ENCOUNTER — OFFICE VISIT (OUTPATIENT)
Dept: PHYSICAL MEDICINE AND REHAB | Facility: CLINIC | Age: 80
End: 2025-07-02
Payer: COMMERCIAL

## 2025-07-02 ENCOUNTER — HOSPITAL ENCOUNTER (OUTPATIENT)
Dept: GENERAL RADIOLOGY | Facility: HOSPITAL | Age: 80
Discharge: HOME | End: 2025-07-02
Attending: PHYSICAL MEDICINE & REHABILITATION
Payer: MEDICARE

## 2025-07-02 DIAGNOSIS — M19.012 OSTEOARTHRITIS OF GLENOHUMERAL JOINT, LEFT: ICD-10-CM

## 2025-07-02 DIAGNOSIS — M75.102 NONTRAUMATIC TEAR OF LEFT ROTATOR CUFF, UNSPECIFIED TEAR EXTENT: ICD-10-CM

## 2025-07-02 DIAGNOSIS — M19.012 OSTEOARTHRITIS OF GLENOHUMERAL JOINT, LEFT: Primary | ICD-10-CM

## 2025-07-02 PROCEDURE — 99204 OFFICE O/P NEW MOD 45 MIN: CPT | Performed by: PHYSICAL MEDICINE & REHABILITATION

## 2025-07-02 PROCEDURE — 1159F MED LIST DOCD IN RCRD: CPT | Performed by: PHYSICAL MEDICINE & REHABILITATION

## 2025-07-02 PROCEDURE — 73030 X-RAY EXAM OF SHOULDER: CPT | Performed by: PHYSICAL MEDICINE & REHABILITATION

## 2025-07-02 RX ORDER — METHYLPREDNISOLONE 4 MG/1
TABLET ORAL
Qty: 1 EACH | Refills: 0 | Status: SHIPPED | OUTPATIENT
Start: 2025-07-02

## 2025-07-02 NOTE — PROGRESS NOTES
[facility] [place] [facility]  NEW PATIENT EVALUATION    Consultation as a request of Vidhi Wolfe      HISTORY OF PRESENT ILLNESS:     Chief Complaint   Patient presents with    New Patient     NP- Right hand dominant here for left upper arm pain that started about 1 month ago. Denies any accident or injury. Pain intermittened, worst with raising the arm. Pain rated 7/10. Patient tried Meloxicam (without help). XR Cervical spine done on 6/09/2025 and a doppler US of left arm on 6/10/2025.          History of Present Illness  Constantin Tabor Jr. is a 79 year old male who presents with left shoulder pain.     He has experienced left shoulder pain for about a month, localized to the shoulder without radiation. The pain worsens with arm lifting, especially during abduction, and he cannot lift it fully without discomfort. He takes meloxicam without relief and has tried over-the-counter creams, including hemp cream, unsuccessfully. He recently began physical therapy but found the initial session more harmful than beneficial. Exercises provided by the therapist are difficult due to pain. He applies heat to the area. 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  Rates pain to be 7 out of 10.  He has had x-ray imaging although cervical spine but no imaging of the shoulder itself     PHYSICAL EXAM:     There were no vitals taken for this visit.      SHOULDER: Pertinent positives highlighted in bold    Inspection    Skin No evidence of eythema, warmth, bruising, abrasions, deformity or drooping about bilateral upper extremities   Atrophy No evidence of muscular atrophy   Range of Motion Severely decreased in all planes in both shoulders   Forward Flexion    Abduction AROM    Adducted ER AROM    Internal Rotation    Palpation      Tenderness No TTP of the greater tuberosity, bicipital groove, sternoclavicular  joint, clavicle, acromioclavicular joint, coracoid and periscapular area   Crepitance Negative palpable/audible subacromial, glenohumeral, and scapulothoracic crepitance   Rotator Cuff    Strength 4/5 empty can; 5/5 ER; 5/5 IR   Impingement Positive Kenney  Positive Neers   ABD to 90 with resisted ER without pain (subacromial)  ABD to 90 with resisted IR without pain/weak (intra-articular)       Biceps/Labral Negative Speed's  Negative Ware's   Negative Yergason      AC Joint Negative cross arm adduction     Stability Negative sulcus, AP translation, apprehension       IMAGING:     None    All imaging results were reviewed and discussed with patient.      ASSESSMENT/PLAN:     1. Osteoarthritis of glenohumeral joint, left    2. Nontraumatic tear of left rotator cuff, unspecified tear extent        Assessment & Plan  Rotator cuff tear with shoulder arthritis  Chronic left shoulder pain with limited range of motion, consistent with rotator cuff tear and significant arthritis. Previous treatments ineffective. Prefers non-surgical options.  - Order x-ray of left shoulder to assess osteoarthritis and rotator cuff tear.  - Discontinue meloxicam.  - Initiate steroid pack for one week.  - Continue physical therapy for four weeks, inform therapist of rotator cuff diagnosis.  - Advise icing shoulder.  - Discuss potential cortisone injection if no improvement.  - Avoid surgery unless necessary.    Generalized arthritis  Generalized arthritis with significant shoulder involvement. Meloxicam ineffective. Discussed anti-inflammatory medication limitations.  - Initiate steroid pack for one week.  - Consider return to meloxicam post-steroid pack if needed.         The patient verbalized understanding with the plan and was in agreement. All questions/concerns were addressed and there were no barriers to learning.  Please note Dragon dictation software was used to dictate this note and can result in inadvertent typos.    Khushboo  Suleiman ACHARYA FAAPMR & CAQSM  Physical Medicine and Rehabilitation  Sports and Spine Medicine      PAST MEDICAL HISTORY:   Past Medical History[1]      PAST SURGICAL HISTORY:   Past Surgical History[2]      CURRENT MEDICATIONS:   Current Medications[3]      ALLERGIES:   Allergies[4]      FAMILY HISTORY:   Family History[5]       SOCIAL HISTORY:   Short Social Hx on File[6]       REVIEW OF SYSTEMS:   Patient-reported ROS  Constitutional  Sleep Disturbance: denies  Chills: denies  Fever: denies  Weight Gain: denies  Weight Loss: denies   Cardiovascular  Chest Pain: denies  Irregular Heartbeat: denies   Respiratory  Painful Breathing: denies  Wheezing: denies   Gastrointestinal  Bowel Incontinence: denies  Heartburn: denies  Abdominal Pain: denies  Blood in Stool : denies  Rectal Pain: denies   Hematology  Easy Bruising: denies  Easy Bleeding: denies   Genitourinary  Difficulty Urinating: denies  Bladder Incontinence: denies  Pelvic Pain: denies  Painful Urination: denies   Musculoskeletal  Joint Stiffness: denies  Painful Joints: denies  Tailbone Pain: denies  Swollen Joints: denies   Peripheral Vascular  Swelling of Legs/Feet: denies  Cold Extremities: denies   Skin  Open Sores: denies  Nodules or Lumps: denies  Rash: denies   Neurological  Loss of Strength Since last Visit: denies  Tingling/Numbness: denies  Balance: denies   Psychiatric  Anxiety: denies  Depressed Mood: denies        PHYSICAL EXAM:     General: No immediate distress  Head: Normocephalic/ Atraumatic  Eyes: Extra-occular movements intact.   Ears: No auricular hematoma or deformities  Mouth: No lesions or ulcerations  Heart: peripheral pulses intact. Normal capillary refill.   Lungs: Non-labored respirations  Abdomen: No abdominal guarding  Extremities: No lower extremity edema bilaterally   Skin: No lesions noted   Cognition: alert & oriented x 3, attentive, able to follow 2 step commands, comprehention intact, spontaneous speech intact  Psychiatric:  Mood and affect appropriate    LABS:   No results found for: \"EAG\", \"A1C\"  Lab Results   Component Value Date    WBC 15.2 (H) 11/02/2024    RBC 5.46 11/02/2024    HGB 10.9 (L) 11/02/2024    HCT 37.3 (L) 11/02/2024    MCV 68.3 (L) 11/02/2024    MCH 20.0 (L) 11/02/2024    MCHC 29.2 (L) 11/02/2024    RDW 22.5 (H) 11/02/2024    .0 (H) 11/02/2024    MPV 8.4 04/12/2018     Lab Results   Component Value Date     (H) 11/02/2024    BUN 14 11/02/2024    BUNCREA 16.1 11/02/2024    CREATSERUM 0.87 11/02/2024    ANIONGAP 8 11/02/2024    GFRNAA 80 09/07/2021    GFRAA 93 09/07/2021    CA 9.8 11/02/2024    OSMOCALC 292 11/02/2024    ALKPHO 176 (H) 11/02/2024    AST 31 11/02/2024    ALT 19 11/02/2024    ALKPHOS 95 03/19/2015    BILT 0.8 11/02/2024    TP 7.4 11/02/2024    ALB 4.7 11/02/2024    GLOBULIN 2.7 11/02/2024    AGRATIO 1.3 03/19/2015     11/02/2024    K 3.8 11/02/2024     11/02/2024    CO2 27.0 11/02/2024     Lab Results   Component Value Date    PTP 14.6 05/10/2024    INR 1.07 05/10/2024     Lab Results   Component Value Date    VITD 27.9 (L) 09/17/2024    MGQP89DC 29.6 10/24/2012                [1]   Past Medical History:   Actinic keratosis    Acute cervical radiculopathy    BCC (basal cell carcinoma of skin)    chest    BCC (basal cell carcinoma)    left nose    Esophageal reflux    Hearing impairment    Lipid screening    Mixed hyperlipidemia    Need for vaccination    Neoplasm of uncertain behavior of skin    Non-healing skin lesion of nose    Physical exam, annual    Psoriasis    Unspecified essential hypertension   [2]   Past Surgical History:  Procedure Laterality Date    Appendectomy  1959    Colonoscopy  10/2009    repeat in 2019    Colonoscopy N/A 7/3/2018    Procedure: COLONOSCOPY;  Surgeon: Alex Mcgee MD;  Location: [facility] ENDOSCOPY    Colonoscopy N/A 5/11/2024    Procedure: COLONOSCOPY;  Surgeon: Shari Preciado MD;  Location: [facility] ENDOSCOPY    Egd       Electrocardiogram, complete  2014    Scanned to media tab    [3]   Current Outpatient Medications   Medication Sig Dispense Refill    methylPREDNISolone 4 MG Oral Tablet Therapy Pack As directed 1 each 0    Meloxicam 7.5 MG Oral Tab Take 1 tablet (7.5 mg total) by mouth daily. 30 tablet 0    pantoprazole 40 MG Oral Tab EC Take 1 tablet (40 mg total) by mouth before breakfast. 90 tablet 3    amLODIPine 5 MG Oral Tab Take 1 tablet (5 mg total) by mouth every morning. 90 tablet 3   [4] No Known Allergies  [5]   Family History  Problem Relation Age of Onset    Diabetes Mother     Cancer Father         lung-smoker    Diabetes Sister    [6]   Social History  Socioeconomic History    Marital status:    Tobacco Use    Smoking status: Former     Current packs/day: 0.00     Types: Cigarettes     Quit date: 1972     Years since quittin.5    Smokeless tobacco: Never   Vaping Use    Vaping status: Never Used   Substance and Sexual Activity    Alcohol use: Yes     Alcohol/week: 2.0 standard drinks of alcohol     Types: 1 Cans of beer, 1 Standard drinks or equivalent per week     Comment: few times a month    Drug use: No    Sexual activity: Yes     Partners: Female   Other Topics Concern    Caffeine Concern Yes     Comment: Daily; 2 cups, coffee    Reaction to local anesthetic No     Social Drivers of Health     Food Insecurity: No Food Insecurity (5/10/2024)    Food Insecurity     Food Insecurity: Never true   Transportation Needs: No Transportation Needs (5/10/2024)    Transportation Needs     Lack of Transportation: No   Housing Stability: Low Risk  (5/10/2024)    Housing Stability     Housing Instability: No

## 2025-07-02 NOTE — PROGRESS NOTES
The following individual(s) verbally consented to be recorded using ambient AI listening technology and understand that they can each withdraw their consent to this listening technology at any point by asking the clinician to turn off or pause the recording:    Patient name: Constantin Tabor Jr.

## 2025-07-02 NOTE — PATIENT INSTRUCTIONS
-Medrol dose pack to be started  -Xray of the shoulder today  -Continue therapy  -Ice the shoulder 3-4 x daily  -follow up in 4 weeks  -If no better, will discuss injection

## 2025-07-11 ENCOUNTER — OFFICE VISIT (OUTPATIENT)
Dept: PHYSICAL THERAPY | Age: 80
End: 2025-07-11
Attending: INTERNAL MEDICINE
Payer: MEDICARE

## 2025-07-11 PROCEDURE — 97112 NEUROMUSCULAR REEDUCATION: CPT | Performed by: PHYSICAL THERAPIST

## 2025-07-11 PROCEDURE — 97140 MANUAL THERAPY 1/> REGIONS: CPT | Performed by: PHYSICAL THERAPIST

## 2025-07-11 PROCEDURE — 97110 THERAPEUTIC EXERCISES: CPT | Performed by: PHYSICAL THERAPIST

## 2025-07-11 NOTE — PROGRESS NOTES
Patient: Constantin Tabor Jr. (79 year old, male) Referring Provider:  Insurance:   Diagnosis: Left upper arm pain (M79.622) Fabricio Morales  [facility] MEDICARE   Date of Surgery: n/a Next MD visit:  N/A   Precautions:  -- (HTN) No data recorded Referral Information:    Date of Evaluation: Req: 8, Auth: 8, Exp: 9/16/2025 06/24/25 POC Auth Visits:          Today's Date   7/11/2025    Subjective  Pt reports that he saw Dr. Bearden. Pt reports that he got x-rays. Pt reports that he is feeling a little bit better. Pt reports that it comes and goes. Pt reports that he worked all day yesterday and had no issues.       Pain: 0/10     Objective  Posture- severly slouched         Shoulder   ROM MMT (-/5)    R L R L     Flex 120 140* 5/5 5/5     Ext 65 73*         Abd (C5) 110 170 4/5 5/5     IR L5 T11 5/5 5/5     ER 30 80 5/5 5/5*     Low Trap n/a         Mid Trap n/a         SA n/a         ,   Elbow   ROM MMT (-/5)    R L R L     Flex (C6) 130 128 5/5 5/5     Ext (C7) 0 0* PDM 5/5 4+/5*     Pronation     5/5 5/5     Supination     5/5 5/5*   ,   Wrist   ROM MMT (-/5)    R L R L     Flex (C7)       5/5*     Ext (C6)       5/5*     Ulnar Dev             Radial Dev             Thumb Ext (C8)             Digit Flex (C8) n/a         Interossei (T1) n/a          (lbs) n/a         Pinch (lbs) n/a         After seated Cervical retraction dec, B ( ER 5/5, elbow ext 5/5* flex 155*)  Posture OC-dec B   Assessment  Pt demo's improved L shoulder AROM and strength compared to eval. Provided verbal cues for correct movement with Cervical retraction. Reviewed posture edu.    Goals (to be met in 8 visits)   1. Pt to be independent in their home exercise program, its’ progression, and management of their symptoms.  2. Pt to demonstrate and be able to verbalize strategies to aid static postural control.  3. Pt to increase B UE strength to 5/5 without pain in order to put away items in OH cabinets  4. Pt to report 50% or more reduction in  symptoms while performing ADLs.           Plan  Cont PT. pt to schedule more visits    Treatment Last 4 Visits  Treatment Day: 3       6/24/2025 6/30/2025 7/11/2025   UE Treatment   Therapeutic Exercise Seated C-spine retraction x 15 ( NE, flexion 130*, elbow ext 5/5*)  Seated C-spine retraction with self OP x15 (NE, flexion 135*(less pain, ) eblow ext 5/5*(less pain) x15 Reviewed cervical spine retraction, cuing form x 10 reps (HEP)    Standing B shoulder ER x 5 reps    Shoulder ER using YTB x 10 reps    S/L Shoulder ER 0# x 10 reps (HEP)    Shoulder flexion AAROM supine x 5 reps - pt c/o, stopped (HEP)    Supine shoulder ER ROM/hand behind head stretch 2 x 15 sec - c/o, stopped    Seated L shoulder flexion AAROM/table slides x 5 reps  - pt c/o, stopped    B row using blue tubing x 30 reps    Standing L shoulder IR using YTB x 20 resp     Seated shoulder pulleys: flex, abd x 10 reps each    Hep instruction/handout; see below.  Reviewed cervical spine retraction with self OP, verbal cuing form x 10, x 10 (dec, B)  Sci Fit x 2 min ( increased pain)   Seated ball squeeze 5sec x20   Neuro Re-Education   Posture edu with towel roll, avoiding the couch and avoid sitting for prolonged periods of time  YTB rows 5sec 2x10 (pain at 12 rest and then 8 more)  YTB ext 5 sec x5 (pain, seated C-retraction dec, B) x10 (pain at end)  Seated scap retraction 5sec x20    Manual Therapy  L GH post and inf  glides grade II    PROM L shoulder flex, abd, IR, ER Manual C-spine traction on/off (dec, B)    Modalities  Ultrasound: L lateral UE 50%, 1.5 W/cm2, 3.3 MHz x 8 min    Therapeutic Exercise Minutes 10 30 10   Neuro Re-Educ Minutes   20   Manual Therapy Minutes  5 10   Ultrasound Minutes  8    Evaluation Minutes 20     Total Time Of Timed Procedures 10 43 40   Total Time Of Service-Based Procedures 20 0 0   Total Treatment Time 30 43 40   HEP Seated C-spine retraction with self OP 15 reps 3-4 times per day; verbal pt declined handout    Discussion of appropriate pain response Cerivcal spine retraction, s/l shoulder ER, supine shoulder AAROM flexion; ice neelima         HEP  Cerivcal spine retraction, s/l shoulder ER, supine shoulder AAROM flexion; ice neelima    Charges  1 therex, 1 man ,1 neuro

## 2025-07-16 ENCOUNTER — APPOINTMENT (OUTPATIENT)
Dept: PHYSICAL THERAPY | Age: 80
End: 2025-07-16
Attending: INTERNAL MEDICINE
Payer: MEDICARE

## 2025-07-21 ENCOUNTER — OFFICE VISIT (OUTPATIENT)
Dept: PHYSICAL THERAPY | Age: 80
End: 2025-07-21
Attending: INTERNAL MEDICINE
Payer: MEDICARE

## 2025-07-21 PROCEDURE — 97110 THERAPEUTIC EXERCISES: CPT | Performed by: PHYSICAL THERAPIST

## 2025-07-21 PROCEDURE — 97140 MANUAL THERAPY 1/> REGIONS: CPT | Performed by: PHYSICAL THERAPIST

## 2025-07-21 NOTE — PROGRESS NOTES
Patient: Constantin Tabor Jr. (79 year old, male) Referring Provider:  Insurance:   Diagnosis: Left upper arm pain (M79.622) Fabricio Morales  [employer]   Date of Surgery: n/a Next MD visit:  N/A   Precautions:  -- (HTN) No data recorded Referral Information:    Date of Evaluation: Req: 8, Auth: 8, Exp: 9/16/2025 06/24/25 POC Auth Visits:          Today's Date   7/21/2025    Subjective  Traction really helped last session. Ultrasound did not help. Work in [employer].       Pain: 6/10     Objective  L shoulder AROM flexion - c/o/symptom reproduction. Palpation: + increased tone/c/o L lateral UE. Cervical spine AROM without affect on L UE.          Assessment  Pt with continued L shoulder and possible cervical spine contributing to L UE c/o. Progressing interventions for each region. Pt reported session helpful today, less c/o following.    Goals (to be met in 8 visits)   1. Pt to be independent in their home exercise program, its’ progression, and management of their symptoms.  2. Pt to demonstrate and be able to verbalize strategies to aid static postural control.  3. Pt to increase B UE strength to 5/5 without pain in order to put away items in [place] cabinets  4. Pt to report 50% or more reduction in symptoms while performing ADLs.         Plan  Continue PT 1-2 x weekly for 8 visits. See Assessment.    Treatment Last 4 Visits  Treatment Day: 4       6/24/2025 6/30/2025 7/11/2025 7/21/2025   UE Treatment   Therapeutic Exercise Seated C-spine retraction x 15 ( NE, flexion 130*, elbow ext 5/5*)  Seated C-spine retraction with self OP x15 (NE, flexion 135*(less pain, ) eblow ext 5/5*(less pain) x15 Reviewed cervical spine retraction, cuing form x 10 reps (HEP)    Standing B shoulder ER x 5 reps    Shoulder ER using YTB x 10 reps    S/L Shoulder ER 0# x 10 reps (HEP)    Shoulder flexion AAROM supine x 5 reps - pt c/o, stopped (HEP)    Supine shoulder ER ROM/hand behind head stretch 2 x 15 sec - c/o,  stopped    Seated L shoulder flexion AAROM/table slides x 5 reps  - pt c/o, stopped    B row using blue tubing x 30 reps    Standing L shoulder IR using YTB x 20 resp     Seated shoulder pulleys: flex, abd x 10 reps each    Hep instruction/handout; see below.  Reviewed cervical spine retraction with self OP, verbal cuing form x 10, x 10 (dec, B)  Sci Fit x 2 min ( increased pain)   Seated ball squeeze 5sec x20 UBE:  ~ 1 min forward/~ 1 min backward (2 min total), level 1    B row using blue tubing x ~10 reps - c/o, stopped     B shoulder ER using yellow tubing x 10 reps    Shoulder pulleys: flex, abd 10 x 3 sec each    Seated spine ext/pec stretch 5 x5 sec each    Attempted supine B shoulder ER and horiz abd using YTB - pt c/o, stopped    S/L L shoulder ER 0# x 10 reps (HEP review - cuing form)    S/L  L shoulder horiz abd - pt c/o, stopped    S/L L shoulder abd 0# x 10 reps    HEP review; see below     Neuro Re-Education   Posture edu with towel roll, avoiding the couch and avoid sitting for prolonged periods of time  YTB rows 5sec 2x10 (pain at 12 rest and then 8 more)  YTB ext 5 sec x5 (pain, seated C-retraction dec, B) x10 (pain at end)  Seated scap retraction 5sec x20     Manual Therapy  L GH post and inf  glides grade II    PROM L shoulder flex, abd, IR, ER Manual C-spine traction on/off (dec, B)  B pec stretch 3 x 20 sec each    L GH posterior glide grade II    PROM L shoulder flex, abd, IR, ER    Gentle effleurage L lateral UE   Modalities  Ultrasound: L lateral UE 50%, 1.5 W/cm2, 3.3 MHz x 8 min     Therapeutic Exercise Minutes 10 30 10 30   Neuro Re-Educ Minutes   20    Manual Therapy Minutes  5 10 10   Ultrasound Minutes  8     Evaluation Minutes 20      Total Time Of Timed Procedures 10 43 40 40   Total Time Of Service-Based Procedures 20 0 0 0   Total Treatment Time 30 43 40 40   HEP Seated C-spine retraction with self OP 15 reps 3-4 times per day; verbal pt declined handout   Discussion of appropriate  pain response Cerivcal spine retraction, s/l shoulder ER, supine shoulder AAROM flexion; ice neelima  Cerivcal spine retraction, s/l shoulder ER, supine shoulder AAROM flexion; ice neelima        HEP  Cerivcal spine retraction, s/l shoulder ER, supine shoulder AAROM flexion; ice neelima    Charges  2 TE, 1 manual

## 2025-07-24 ENCOUNTER — APPOINTMENT (OUTPATIENT)
Dept: PHYSICAL THERAPY | Age: 80
End: 2025-07-24
Attending: INTERNAL MEDICINE
Payer: MEDICARE

## 2025-07-24 ENCOUNTER — TELEPHONE (OUTPATIENT)
Dept: PHYSICAL THERAPY | Age: 80
End: 2025-07-24

## 2025-07-28 ENCOUNTER — OFFICE VISIT (OUTPATIENT)
Dept: PHYSICAL THERAPY | Age: 80
End: 2025-07-28
Attending: INTERNAL MEDICINE
Payer: MEDICARE

## 2025-07-28 PROCEDURE — 97140 MANUAL THERAPY 1/> REGIONS: CPT | Performed by: PHYSICAL THERAPIST

## 2025-07-28 PROCEDURE — 97110 THERAPEUTIC EXERCISES: CPT | Performed by: PHYSICAL THERAPIST

## 2025-07-29 ENCOUNTER — APPOINTMENT (OUTPATIENT)
Dept: PHYSICAL THERAPY | Age: 80
End: 2025-07-29
Attending: INTERNAL MEDICINE
Payer: MEDICARE

## 2025-08-01 ENCOUNTER — APPOINTMENT (OUTPATIENT)
Dept: PHYSICAL THERAPY | Age: 80
End: 2025-08-01
Attending: INTERNAL MEDICINE

## 2025-08-14 ENCOUNTER — APPOINTMENT (OUTPATIENT)
Dept: PHYSICAL THERAPY | Age: 80
End: 2025-08-14
Attending: INTERNAL MEDICINE

## 2025-08-18 ENCOUNTER — APPOINTMENT (OUTPATIENT)
Dept: PHYSICAL THERAPY | Age: 80
End: 2025-08-18
Attending: INTERNAL MEDICINE

## 2025-08-20 ENCOUNTER — APPOINTMENT (OUTPATIENT)
Dept: PHYSICAL THERAPY | Age: 80
End: 2025-08-20
Attending: INTERNAL MEDICINE

## 2025-08-25 ENCOUNTER — APPOINTMENT (OUTPATIENT)
Dept: PHYSICAL THERAPY | Age: 80
End: 2025-08-25
Attending: INTERNAL MEDICINE

## (undated) DEVICE — SNARE CAPTIFLEX MICRO-OVL OLY

## (undated) DEVICE — CONMED SCOPE SAVER BITE BLOCK, 20X27 MM: Brand: SCOPE SAVER

## (undated) DEVICE — ESOPHAGEAL/PYLORIC/COLONIC WIREGUIDED BALLOON DILATATION CATHETER: Brand: CRE WIREGUIDED

## (undated) DEVICE — YANKAUER,BULB TIP,W/O VENT,RIGID,STERILE: Brand: MEDLINE

## (undated) DEVICE — FORCEP RADIAL JAW 4

## (undated) DEVICE — ENDOSCOPY PACK UPPER: Brand: MEDLINE INDUSTRIES, INC.

## (undated) DEVICE — SNARE LARIAT HEXAGONAL 10X28

## (undated) DEVICE — KIT CLEAN ENDOKIT 1.1OZ GOWNX2

## (undated) DEVICE — MEDI-VAC NON-CONDUCTIVE SUCTION TUBING 6MM X 1.8M (6FT.) L: Brand: CARDINAL HEALTH

## (undated) DEVICE — Device: Brand: DEFENDO AIR/WATER/SUCTION AND BIOPSY VALVE

## (undated) DEVICE — MEDI-VAC NON-CONDUCTIVE SUCTION TUBING: Brand: CARDINAL HEALTH

## (undated) DEVICE — 60 ML SYRINGE REGULAR TIP: Brand: MONOJECT

## (undated) DEVICE — ENDOSCOPY PACK - LOWER: Brand: MEDLINE INDUSTRIES, INC.

## (undated) DEVICE — SNARE OPTMZ PLPCTM TRP

## (undated) DEVICE — Device: Brand: DUAL NARE NASAL CANNULAE FEMALE LUER CON 7FT O2 TUBE

## (undated) DEVICE — SNARE 9MM 230CM 2.4MM EXACTO

## (undated) DEVICE — KIT ENDO ORCAPOD 160/180/190

## (undated) NOTE — LETTER
April 26, 2018    [address] [place] [address]      Dear Hernan Junior:    The following are the results of your recent tests. Please review the list of test results.   Your result is the value on the left; we have also supplied the leukocyte subsets or platelets. RBCs show mild anisopoikilocytosis with few microcytes, scattered ovalocytes, and minimal polychromasia.     Differential considerations for eosinophilia may include allergic and hypersensitivity reactions, drug allergies,

## (undated) NOTE — LETTER
[facility]  Administration of Anesthesia  I, Constantin Tabor Jr. agree to be cared for by a physician anesthesiologist alone and/or with a nurse anesthetist, who is specially trained to monitor me and give me medicine to put me to sleep or keep me comfortable during my procedure    I understand that my anesthesiologist and/or anesthetist is not an employee or agent of [facility] or [employer]. He or she works for [facility]    As the patient asking for anesthesia services, I agree to:  Allow the anesthesiologist (anesthesia doctor) to give me medicine and do additional procedures as necessary. Some examples are: Starting or using an “IV” to give me medicine, fluids or blood during my procedure, and having a breathing tube placed to help me breathe when I’m asleep (intubation). In the event that my heart stops working properly, I understand that my anesthesiologist will make every effort to sustain my life, unless otherwise directed by [facility] Do Not Resuscitate documents.  Tell my anesthesia doctor before my procedure:  If I am pregnant.  The last time that I ate or drank.  iii. All of the medicines I take (including prescriptions, herbal supplements, and pills I can buy without a prescription (including street drugs/illegal medications). Failure to inform my anesthesiologist about these medicines may increase my risk of anesthetic complications.  iv.If I am allergic to anything or have had a reaction to anesthesia before.  I understand how the anesthesia medicine will help me (benefits).  I understand that with any type of anesthesia medicine there are risks:  The most common risks are: nausea, vomiting, sore throat, muscle soreness, damage to my eyes, mouth, or teeth (from breathing tube placement).  Rare risks include: remembering what happened during my procedure, allergic reactions to medications, injury to my airway, heart, lungs, vision, nerves, or  muscles and in extremely rare instances death.  My doctor has explained to me other choices available to me for my care (alternatives).  Pregnant Patients (“epidural”):  I understand that the risks of having an epidural (medicine given into my back to help control pain during labor), include itching, low blood pressure, difficulty urinating, headache or slowing of the baby’s heart. Very rare risks include infection, bleeding, seizure, irregular heart rhythms and nerve injury.  Regional Anesthesia (“spinal”, “epidural”, & “nerve blocks”):  I understand that rare but potential complications include headache, bleeding, infection, seizure, irregular heart rhythms, and nerve injury.    _____________________________________________________________________________  Patient (or Representative) Signature/Relationship to Patient  Date   Time    _____________________________________________________________________________   Name (if used)    Language/Organization   Time    _____________________________________________________________________________  Nurse Anesthetist Signature     Date   Time  _____________________________________________________________________________  Anesthesiologist Signature     Date   Time  I have discussed the procedure and information above with the patient (or patient’s representative) and answered their questions. The patient or their representative has agreed to have anesthesia services.    _____________________________________________________________________________  Witness        Date   Time  I have verified that the signature is that of the patient or patient’s representative, and that it was signed before the procedure  Patient Name: Constantin Tabor JrLinden     : 1945                 Printed: 2024 at 6:59 AM    Medical Record #: [id number]                                            Page 1 of 1  ----------ANESTHESIA CONSENT----------

## (undated) NOTE — LETTER
[facility], [facility], [place]  [address] [facility], [address] [place] [address], [facility]  Dept: [phone]    8/3/2018        [address] [facility] [address]             Dear Aayush Barry,    [address] [place] [address]

## (undated) NOTE — MR AVS SNAPSHOT
[address] [address]  [phone]               Thank you for choosing us for your health care visit with Waylon Heard MD.  We are glad to serve you and happy to provide you with this s Lisinopril-Hydrochlorothiazide 10-12.5 MG Tabs   Take 1 tablet by mouth daily. naproxen 500 MG Tabs   Take 1 tablet (500 mg total) by mouth as needed.    Commonly known as:  NAPROSYN           omeprazole 20 MG Cpdr   Take 1 capsule (20 mg total)

## (undated) NOTE — LETTER
03/26/20        [address] [facility] [address] [facility]      Dear Heriberto Pickering,    [facility] records indicate that you have outstanding lab work and or testing that was ordered for you and has not yet been completed:  CBC W Differential W Platelet

## (undated) NOTE — LETTER
[place] [address], [place], [place]    Authorization for Surgical Operation and Procedure                               I hereby authorize Shari Preciado MD, my physician and his/her assistants (if applicable), which may include medical students, residents, and/or fellows, to perform the following surgical operation/ procedure and administer such anesthesia as may be determined necessary by my physician: Operation/Procedure name (s) ESOPHAGOGASTRODUODENOSCOPY (EGD)/COLONOSCOPY on Constantin Tabor Jr.   2.   I recognize that during the surgical operation/procedure, unforeseen conditions may necessitate additional or different procedures than those listed above.  I, therefore, further authorize and request that the above-named surgeon, assistants, or designees perform such procedures as are, in their judgment, necessary and desirable.    3.   My surgeon/physician has discussed prior to my surgery the potential benefits, risks and side effects of this procedure; the likelihood of achieving goals; and potential problems that might occur during recuperation.  They also discussed reasonable alternatives to the procedure, including risks, benefits, and side effects related to the alternatives and risks related to not receiving this procedure.  I have had all my questions answered and I acknowledge that no guarantee has been made as to the result that may be obtained.    4.   Should the need arise during my operation/procedure, which includes change of level of care prior to discharge, I also consent to the administration of blood and/or blood products.  Further, I understand that despite careful testing and screening of blood or blood products by collecting agencies, I may still be subject to ill effects as a result of receiving a blood transfusion and/or blood products.  The following are some, but not all, of the potential risks that can occur: fever and allergic reactions,  hemolytic reactions, transmission of diseases such as Hepatitis, AIDS and Cytomegalovirus (CMV) and fluid overload.  In the event that I wish to have an autologous transfusion of my own blood, or a directed donor transfusion, I will discuss this with my physician.  Check only if Refusing Blood or Blood Products  I understand refusal of blood or blood products as deemed necessary by my physician may have serious consequences to my condition to include possible death. I hereby assume responsibility for my refusal and release the hospital, its personnel, and my physicians from any responsibility for the consequences of my refusal.    o  Refuse   5.   I authorize the use of any specimen, organs, tissues, body parts or foreign objects that may be removed from my body during the operation/procedure for diagnosis, research or teaching purposes and their subsequent disposal by hospital authorities.  I also authorize the release of specimen test results and/or written reports to my treating physician on the hospital medical staff or other referring or consulting physicians involved in my care, at the discretion of the Pathologist or my treating physician.    6.   I consent to the photographing or videotaping of the operations or procedures to be performed, including appropriate portions of my body for medical, scientific, or educational purposes, provided my identity is not revealed by the pictures or by descriptive texts accompanying them.  If the procedure has been photographed/videotaped, the surgeon will obtain the original picture, image, videotape or CD.  The hospital will not be responsible for storage, release or maintenance of the picture, image, tape or CD.    7.   I consent to the presence of a  or observers in the operating room as deemed necessary by my physician or their designees.    8.   I recognize that in the event my procedure results in extended X-Ray/fluoroscopy time, I may develop a  skin reaction.    9. If I have a Do Not Attempt Resuscitation (DNAR) order in place, that status will be suspended while in the operating room, procedural suite, and during the recovery period unless otherwise explicitly stated by me (or a person authorized to consent on my behalf). The surgeon or my attending physician will determine when the applicable recovery period ends for purposes of reinstating the DNAR order.  10. Patients having a sterilization procedure: I understand that if the procedure is successful the results will be permanent and it will therefore be impossible for me to inseminate, conceive, or bear children.  I also understand that the procedure is intended to result in sterility, although the result has not been guaranteed.   11. I acknowledge that my physician has explained sedation/analgesia administration to me including the risk and benefits I consent to the administration of sedation/analgesia as may be necessary or desirable in the judgment of my physician.    I CERTIFY THAT I HAVE READ AND FULLY UNDERSTAND THE ABOVE CONSENT TO OPERATION and/or OTHER PROCEDURE.     ____________________________________  _________________________________        ______________________________  Signature of Patient    Signature of Responsible Person                Printed Name of Responsible Person                                      ____________________________________  _____________________________                ________________________________  Signature of Witness        Date  Time         Relationship to Patient    STATEMENT OF PHYSICIAN My signature below affirms that prior to the time of the procedure; I have explained to the patient and/or his/her legal representative, the risks and benefits involved in the proposed treatment and any reasonable alternative to the proposed treatment. I have also explained the risks and benefits involved in refusal of the proposed treatment and alternatives to the  proposed treatment and have answered the patient's questions. If I have a significant financial interest in a co-management agreement or a significant financial interest in any product or implant, or other significant relationship used in this procedure/surgery, I have disclosed this and had a discussion with my patient.     _____________________________________________________              _____________________________  (Signature of Physician)                                                                                         (Date)                                   (Time)  Patient Name: Constantin Tabor       : 1945      Printed: 2024     Medical Record #: [id number]                                      Page 1 of 1

## (undated) NOTE — ED AVS SNAPSHOT
Haseeb Bryant. MRN: [id number]    Department:  [facility] Emergency Department   Date of Visit:  2/5/2019           Disclosure     Insurance plans vary and the physician(s) referred by the ER may not be covered by your plan.  Please contact within the next three months to obtain basic health screening including reassessment of your blood pressure.     IF THERE IS ANY CHANGE OR WORSENING OF YOUR CONDITION, CALL YOUR PRIMARY CARE PHYSICIAN AT ONCE OR RETURN IMMEDIATELY TO THE EMERGENCY DEPARTMEN

## (undated) NOTE — LETTER
[facility], [facility]  [address] [facility] [address]  PH: [phone]  FAX: [phone]        25  Constantin Tabor Jr., :  1945  [address] [place] [facility] [address]    [facility],      This is the [facility], office of Dr. Fabricio Morales     Thank you for putting your trust in [facility].  Our goal is to deliver the highest quality healthcare and an exceptional patient experience. Upon reviewing of your medical record shows you are due for the following:       Annual physical              Please call [phone] to schedule your appointment or schedule online via [facility].     If you changed to a new provider at another facility, please notify the clinic to update your records.     If you had any recent testing at another facility, please have your results faxed to our office at [phone].      Thank you and have a great day!

## (undated) NOTE — LETTER
5/8/2023    [facility] [address]isha Rosalesaditya Crocker[place] [place] [id number]            Dear Shannon Ledezma.,      Our records indicate that you are due for an appointment for a Colonoscopy with Shelley Walsh MD. Our doctors are booking out about 3-5 months in advance for procedures. Please call our office to schedule a phone screening appointment to plan for the procedure(s). Your medical well-being is important to us. If your insurance requires a referral, please call your primary care office to request one.       Thank you,      The Physicians and Staff at [facility]